# Patient Record
Sex: FEMALE | Race: WHITE | NOT HISPANIC OR LATINO | Employment: OTHER | URBAN - METROPOLITAN AREA
[De-identification: names, ages, dates, MRNs, and addresses within clinical notes are randomized per-mention and may not be internally consistent; named-entity substitution may affect disease eponyms.]

---

## 2017-02-24 ENCOUNTER — OFFICE VISIT (OUTPATIENT)
Dept: FAMILY MEDICINE | Facility: CLINIC | Age: 57
End: 2017-02-24
Payer: COMMERCIAL

## 2017-02-24 VITALS
WEIGHT: 155.6 LBS | TEMPERATURE: 98 F | HEART RATE: 83 BPM | HEIGHT: 62 IN | OXYGEN SATURATION: 97 % | BODY MASS INDEX: 28.63 KG/M2 | DIASTOLIC BLOOD PRESSURE: 80 MMHG | SYSTOLIC BLOOD PRESSURE: 122 MMHG

## 2017-02-24 DIAGNOSIS — M65.4 DE QUERVAIN'S DISEASE (TENOSYNOVITIS): Primary | ICD-10-CM

## 2017-02-24 PROCEDURE — 99213 OFFICE O/P EST LOW 20 MIN: CPT | Performed by: FAMILY MEDICINE

## 2017-02-24 NOTE — MR AVS SNAPSHOT
"              After Visit Summary   2/24/2017    Allie Thao    MRN: [id number]           Patient Information     Date Of Birth          1960        Visit Information        Provider Department      2/24/2017 2:20 PM Donna Cheatham MD [facility] [place]        Today's Diagnoses     De Quervain's disease (tenosynovitis)    -  1       Follow-ups after your visit        Who to contact     If you have questions or need follow up information about today's clinic visit or your schedule please contact [facility] [place] directly at [phone].  Normal or non-critical lab and imaging results will be communicated to you by Yelphart, letter or phone within 4 business days after the clinic has received the results. If you do not hear from us within 7 days, please contact the clinic through Yelphart or phone. If you have a critical or abnormal lab result, we will notify you by phone as soon as possible.  Submit refill requests through OptMed or call your pharmacy and they will forward the refill request to us. Please allow 3 business days for your refill to be completed.          Additional Information About Your Visit        MyChart Information     OptMed gives you secure access to your electronic health record. If you see a primary care provider, you can also send messages to your care team and make appointments. If you have questions, please call your primary care clinic.  If you do not have a primary care provider, please call [phone] and they will assist you.        Care EveryWhere ID     This is your Care EveryWhere ID. This could be used by other organizations to access your [facility] medical records  [id number]        Your Vitals Were     Pulse Temperature Height Last Period Pulse Oximetry BMI (Body Mass Index)    83 98  F (36.7  C) 5' 2\" (1.575 m) 08/16/2010 97% 28.46 kg/m2       Blood Pressure from Last 3 Encounters:   02/24/17 122/80   09/21/16 122/82   09/02/16 120/80    Weight " from Last 3 Encounters:   02/24/17 155 lb 9.6 oz (70.6 kg)   09/21/16 150 lb (68 kg)   09/02/16 149 lb (67.6 kg)              Today, you had the following     No orders found for display         Today's Medication Changes          These changes are accurate as of: 2/24/17  4:16 PM.  If you have any questions, ask your nurse or doctor.               Start taking these medicines.        Dose/Directions    order for DME   Used for:  De Quervain's disease (tenosynovitis)   Started by:  Donna Cheatham MD        Equipment being ordered: left thumb spica.   Quantity:  1 each   Refills:  0            Where to get your medicines      Some of these will need a paper prescription and others can be bought over the counter.  Ask your nurse if you have questions.     Bring a paper prescription for each of these medications     order for DME                Primary Care Provider Office Phone # Fax #    Donna Cheatham [phone]       [address]  [place] [address]        Thank you!     Thank you for choosing [facility]  for your care. Our goal is always to provide you with excellent care. Hearing back from our patients is one way we can continue to improve our services. Please take a few minutes to complete the written survey that you may receive in the mail after your visit with us. Thank you!             Your Updated Medication List - Protect others around you: Learn how to safely use, store and throw away your medicines at www.disposemymeds.org.          This list is accurate as of: 2/24/17  4:16 PM.  Always use your most recent med list.                   Brand Name Dispense Instructions for use    ALLEGRA 180 MG tablet   Generic drug:  fexofenadine     90 tablet    Take 1 tablet by mouth daily.       buPROPion 150 MG 24 hr tablet    WELLBUTRIN XL    90 tablet    Take 1 tablet (150 mg) by mouth every morning       Calcium-Vitamin D-Vitamin K  500-100-40 MG-UNT-MCG Chew     90 tablet        DAILY MULTIVITAMIN PO      Take 1 tablet by mouth daily       estradiol 1 MG tablet    ESTRACE    90 tablet    TAKE 1 TABLET DAILY       fluticasone 50 MCG/ACT spray    FLONASE    16 g    Spray 2 sprays into both nostrils daily       ketotifen 0.025 % Soln ophthalmic solution    ZADITOR    1 Bottle    Place 1 drop into both eyes every 12 hours       levothyroxine 100 MCG tablet    SYNTHROID/LEVOTHROID    90 tablet    Take 1 tablet (100 mcg) by mouth daily       order for DME     1 each    Equipment being ordered: left thumb spica.       progesterone 200 MG capsule    PROMETRIUM    90 capsule    TAKE 1 CAPSULE AT BEDTIME       simvastatin 10 MG tablet    ZOCOR    90 tablet    Take 1 tablet (10 mg) by mouth At Bedtime       venlafaxine 75 MG 24 hr capsule    EFFEXOR-XR    30 capsule    Take 1 capsule (75 mg) by mouth daily

## 2017-02-24 NOTE — PROGRESS NOTES
SUBJECTIVE:                                                    Allie Thao is a 57 year old female who presents to clinic today for the following health issues:      Concern - thumb pain      Onset: 3 days     Description:   Left thumb pain    Intensity: moderate 7/10    Progression of Symptoms:  worsening    Accompanying Signs & Symptoms:  None        Previous history of similar problem:    yes    Precipitating factors:   Worsened by: movement    Patient denies trauma.      Therapies Tried and outcome: brace, tylenol, ibuprofen, chiropractor     Denies any weakness, numbness or tingling. Denies any swelling.    Problem list and histories reviewed & adjusted, as indicated.  Additional history: as documented    Current Outpatient Prescriptions   Medication Sig Dispense Refill     order for DME Equipment being ordered: left thumb spica. 1 each 0     progesterone (PROMETRIUM) 200 MG capsule TAKE 1 CAPSULE AT BEDTIME 90 capsule 1     ketotifen (ZADITOR) 0.025 % SOLN Place 1 drop into both eyes every 12 hours 1 Bottle 3     venlafaxine (EFFEXOR-XR) 75 MG 24 hr capsule Take 1 capsule (75 mg) by mouth daily 30 capsule 3     simvastatin (ZOCOR) 10 MG tablet Take 1 tablet (10 mg) by mouth At Bedtime 90 tablet 3     buPROPion (WELLBUTRIN XL) 150 MG 24 hr tablet Take 1 tablet (150 mg) by mouth every morning 90 tablet 1     levothyroxine (SYNTHROID,LEVOTHROID) 100 MCG tablet Take 1 tablet (100 mcg) by mouth daily 90 tablet 3     fluticasone (FLONASE) 50 MCG/ACT nasal spray Spray 2 sprays into both nostrils daily 16 g 11     estradiol (ESTRACE) 1 MG tablet TAKE 1 TABLET DAILY 90 tablet 2     Calcium-Vitamin D-Vitamin K 500-100-40 MG-UNT-MCG CHEW  90 tablet      Multiple Vitamin (DAILY MULTIVITAMIN PO) Take 1 tablet by mouth daily       fexofenadine (ALLEGRA) 180 MG tablet Take 1 tablet by mouth daily. 90 tablet 3     Allergies   Allergen Reactions     Cats      Ragweeds      Recent Labs   Lab Test  09/21/16   1910   "09/24/15   0725   08/14/14   0755   LDL  69  61   --   52   HDL  59  56   --   58   TRIG  128  166*   --   148   ALT  21  20   --   17   CR  0.72  0.79   --   0.75   GFRESTIMATED  83  75   --   80   GFRESTBLACK  >90   GFR Calc    >90   GFR Calc     --   >90   GFR Calc     POTASSIUM  4.2  3.9   --   4.0   TSH  1.94  2.61   < >  0.22*    < > = values in this interval not displayed.        ROS:  C: NEGATIVE for fever, chills, change in weight  INTEGUMENTARY/SKIN: NEGATIVE for worrisome rashes, moles or lesions    OBJECTIVE:                                                    /80 (BP Location: Right arm, Patient Position: Chair, Cuff Size: Adult Regular)  Pulse 83  Temp 98  F (36.7  C)  Ht 5' 2\" (1.575 m)  Wt 155 lb 9.6 oz (70.6 kg)  LMP 08/16/2010  SpO2 97%  BMI 28.46 kg/m2  Body mass index is 28.46 kg/(m^2).   GENERAL: healthy, alert, well nourished, well hydrated, no distress  RESP: lungs clear to auscultation - no rales, no rhonchi, no wheezes  CV: regular rates and rhythm, normal S1 S2, no S3 or S4 and no murmur, no click or rub -  Left hand: No swelling or ecchymosis or erythema noted. Mild tenderness on palpation over the base of the thumb to the wrist. Range of motion of the thumb is normal. Pain felt with moving the thumb against resistance. Normal sensation.    No tenderness over the wrist. Range of motion of the wrist is normal. No tenderness in the forearm noted.       ASSESSMENT/PLAN:                                                        ICD-10-CM    1. De Quervain's disease (tenosynovitis) M65.4 order for DME     Patient is given a thumb spica to immobilize the affected side. Recommended to use ibuprofen at least twice a day for the next few days to improve the inflammation.  If in the next 2-3 weeks, and no improvement noted, she is asked to contact me back to get a referral to hand therapy.  No imaging studies indicated today.      Follow up with " Provider - as needed     Donna Cheatham MD  [place]

## 2017-02-24 NOTE — NURSING NOTE
".  Chief Complaint   Patient presents with     Finger       Initial /80 (BP Location: Right arm, Patient Position: Chair, Cuff Size: Adult Regular)  Pulse 83  Temp 98  F (36.7  C)  Ht 5' 2\" (1.575 m)  Wt 155 lb 9.6 oz (70.6 kg)  LMP 08/16/2010  SpO2 97%  BMI 28.46 kg/m2 Estimated body mass index is 28.46 kg/(m^2) as calculated from the following:    Height as of this encounter: 5' 2\" (1.575 m).    Weight as of this encounter: 155 lb 9.6 oz (70.6 kg).  Medication Reconciliation: complete Leigha NASH MA Student  "

## 2017-03-06 DIAGNOSIS — F32.1 MAJOR DEPRESSIVE DISORDER, SINGLE EPISODE, MODERATE (H): ICD-10-CM

## 2017-03-07 NOTE — TELEPHONE ENCOUNTER
effexor     Last Written Prescription Date: 9/21/16  Last Fill Quantity: 30, # refills: 3  Last Office Visit with G, P or [facility] prescribing provider: 2/24/17        BP Readings from Last 3 Encounters:   02/24/17 122/80   09/21/16 122/82   09/02/16 120/80     Pulse: (for Fetzima)  Creatinine   Date Value Ref Range Status   09/21/2016 0.72 0.52 - 1.04 mg/dL Final   ]    Last PHQ-9 score on record=   PHQ-9 SCORE 9/21/2016   Total Score -   Total Score 2

## 2017-03-08 RX ORDER — VENLAFAXINE HYDROCHLORIDE 75 MG/1
CAPSULE, EXTENDED RELEASE ORAL
Qty: 90 CAPSULE | Refills: 1 | Status: SHIPPED | OUTPATIENT
Start: 2017-03-08 | End: 2017-05-18

## 2017-03-08 NOTE — TELEPHONE ENCOUNTER
Completed PHQ-9 over the phone.  PHQ-9 SCORE 7/12/2016 9/21/2016 3/8/2017   Total Score - - -   Total Score 2 2 0     Prescription approved per [facility] Refill Protocol.  Soledad Mckenzie RN

## 2017-03-08 NOTE — TELEPHONE ENCOUNTER
Dr. Camp.  Please disregard.  Routing to wrong provider.      Needs update PHQ-9.    mychart message sent with questionnaire attached.  Awaiting response.    Sera Cardona RN

## 2017-03-09 ASSESSMENT — PATIENT HEALTH QUESTIONNAIRE - PHQ9: SUM OF ALL RESPONSES TO PHQ QUESTIONS 1-9: 0

## 2017-03-13 ASSESSMENT — ANXIETY QUESTIONNAIRES
1. FEELING NERVOUS, ANXIOUS, OR ON EDGE: NOT AT ALL
5. BEING SO RESTLESS THAT IT IS HARD TO SIT STILL: NOT AT ALL
6. BECOMING EASILY ANNOYED OR IRRITABLE: NOT AT ALL
IF YOU CHECKED OFF ANY PROBLEMS ON THIS QUESTIONNAIRE, HOW DIFFICULT HAVE THESE PROBLEMS MADE IT FOR YOU TO DO YOUR WORK, TAKE CARE OF THINGS AT HOME, OR GET ALONG WITH OTHER PEOPLE: NOT DIFFICULT AT ALL
3. WORRYING TOO MUCH ABOUT DIFFERENT THINGS: NOT AT ALL
7. FEELING AFRAID AS IF SOMETHING AWFUL MIGHT HAPPEN: NOT AT ALL
2. NOT BEING ABLE TO STOP OR CONTROL WORRYING: NOT AT ALL
GAD7 TOTAL SCORE: 0

## 2017-03-13 ASSESSMENT — PATIENT HEALTH QUESTIONNAIRE - PHQ9: 5. POOR APPETITE OR OVEREATING: NOT AT ALL

## 2017-03-14 ASSESSMENT — PATIENT HEALTH QUESTIONNAIRE - PHQ9: SUM OF ALL RESPONSES TO PHQ QUESTIONS 1-9: 0

## 2017-03-14 ASSESSMENT — ANXIETY QUESTIONNAIRES: GAD7 TOTAL SCORE: 0

## 2017-03-17 ENCOUNTER — OFFICE VISIT (OUTPATIENT)
Dept: FAMILY MEDICINE | Facility: CLINIC | Age: 57
End: 2017-03-17
Payer: COMMERCIAL

## 2017-03-17 VITALS
BODY MASS INDEX: 28.71 KG/M2 | TEMPERATURE: 98.1 F | HEART RATE: 87 BPM | WEIGHT: 156 LBS | SYSTOLIC BLOOD PRESSURE: 120 MMHG | DIASTOLIC BLOOD PRESSURE: 78 MMHG | OXYGEN SATURATION: 99 % | HEIGHT: 62 IN

## 2017-03-17 DIAGNOSIS — M65.4 DE QUERVAIN'S DISEASE (TENOSYNOVITIS): Primary | ICD-10-CM

## 2017-03-17 PROCEDURE — 99213 OFFICE O/P EST LOW 20 MIN: CPT | Performed by: FAMILY MEDICINE

## 2017-03-17 NOTE — MR AVS SNAPSHOT
"              After Visit Summary   3/17/2017    Allie Thao    MRN: [id number]           Patient Information     Date Of Birth          1960        Visit Information        Provider Department      3/17/2017 3:00 PM Donna Cheatham MD [facility] [place]        Today's Diagnoses     De Quervain's disease (tenosynovitis)    -  1       Follow-ups after your visit        Who to contact     If you have questions or need follow up information about today's clinic visit or your schedule please contact [facility] [place] directly at [phone].  Normal or non-critical lab and imaging results will be communicated to you by theAudiencehart, letter or phone within 4 business days after the clinic has received the results. If you do not hear from us within 7 days, please contact the clinic through theAudiencehart or phone. If you have a critical or abnormal lab result, we will notify you by phone as soon as possible.  Submit refill requests through Tessella or call your pharmacy and they will forward the refill request to us. Please allow 3 business days for your refill to be completed.          Additional Information About Your Visit        MyChart Information     Tessella gives you secure access to your electronic health record. If you see a primary care provider, you can also send messages to your care team and make appointments. If you have questions, please call your primary care clinic.  If you do not have a primary care provider, please call [phone] and they will assist you.        Care EveryWhere ID     This is your Care EveryWhere ID. This could be used by other organizations to access your [place] medical records  [id number]        Your Vitals Were     Pulse Temperature Height Last Period Pulse Oximetry BMI (Body Mass Index)    87 98.1  F (36.7  C) (Tympanic) 5' 2\" (1.575 m) 08/16/2010 99% 28.53 kg/m2       Blood Pressure from Last 3 Encounters:   03/17/17 120/78   02/24/17 122/80   09/21/16 " 122/82    Weight from Last 3 Encounters:   03/17/17 156 lb (70.8 kg)   02/24/17 155 lb 9.6 oz (70.6 kg)   09/21/16 150 lb (68 kg)              Today, you had the following     No orders found for display       Primary Care Provider Office Phone # Fax #    Donna Cheatham [phone]       [facility] [address] [place] [address] [address]  [place] [address]        Thank you!     Thank you for choosing [facility]  for your care. Our goal is always to provide you with excellent care. Hearing back from our patients is one way we can continue to improve our services. Please take a few minutes to complete the written survey that you may receive in the mail after your visit with us. Thank you!             Your Updated Medication List - Protect others around you: Learn how to safely use, store and throw away your medicines at www.disposemymeds.org.          This list is accurate as of: 3/17/17  3:48 PM.  Always use your most recent med list.                   Brand Name Dispense Instructions for use    ALLEGRA 180 MG tablet   Generic drug:  fexofenadine     90 tablet    Take 1 tablet by mouth daily.       buPROPion 150 MG 24 hr tablet    WELLBUTRIN XL    90 tablet    Take 1 tablet (150 mg) by mouth every morning       Calcium-Vitamin D-Vitamin K 500-100-40 MG-UNT-MCG Chew     90 tablet        DAILY MULTIVITAMIN PO      Take 1 tablet by mouth daily       estradiol 1 MG tablet    ESTRACE    90 tablet    TAKE 1 TABLET DAILY       fluticasone 50 MCG/ACT spray    FLONASE    16 g    Spray 2 sprays into both nostrils daily       ketotifen 0.025 % Soln ophthalmic solution    ZADITOR    1 Bottle    Place 1 drop into both eyes every 12 hours       levothyroxine 100 MCG tablet    SYNTHROID/LEVOTHROID    90 tablet    Take 1 tablet (100 mcg) by mouth daily       order for DME     1 each    Equipment being ordered: left thumb spica.       progesterone 200 MG capsule    PROMETRIUM    90 capsule     TAKE 1 CAPSULE AT BEDTIME       simvastatin 10 MG tablet    ZOCOR    90 tablet    Take 1 tablet (10 mg) by mouth At Bedtime       venlafaxine 75 MG 24 hr capsule    EFFEXOR-XR    90 capsule    TAKE ONE CAPSULE BY MOUTH EVERY DAY

## 2017-03-17 NOTE — PROGRESS NOTES
SUBJECTIVE:                                                    Allie Thao is a 57 year old female who presents to clinic today for the following health issues:      Concern - thumb tendonitis      Onset: follow-up    Description:   Improved alot but still having tingling with some numbness.    Intensity: mild, moderate    Progression of Symptoms:  improving    Accompanying Signs & Symptoms:      She was able to work without the brace today on her computer and tolerated it well. C/o mild pain with pushing over the affected area. No swelling.      Therapies Tried and outcome: thumb brace and advil         Problem list and histories reviewed & adjusted, as indicated.  Additional history: as documented    Current Outpatient Prescriptions   Medication Sig Dispense Refill     venlafaxine (EFFEXOR-XR) 75 MG 24 hr capsule TAKE ONE CAPSULE BY MOUTH EVERY DAY 90 capsule 1     order for DME Equipment being ordered: left thumb spica. 1 each 0     ketotifen (ZADITOR) 0.025 % SOLN Place 1 drop into both eyes every 12 hours 1 Bottle 3     simvastatin (ZOCOR) 10 MG tablet Take 1 tablet (10 mg) by mouth At Bedtime 90 tablet 3     buPROPion (WELLBUTRIN XL) 150 MG 24 hr tablet Take 1 tablet (150 mg) by mouth every morning 90 tablet 1     levothyroxine (SYNTHROID,LEVOTHROID) 100 MCG tablet Take 1 tablet (100 mcg) by mouth daily 90 tablet 3     fluticasone (FLONASE) 50 MCG/ACT nasal spray Spray 2 sprays into both nostrils daily 16 g 11     Calcium-Vitamin D-Vitamin K 500-100-40 MG-UNT-MCG CHEW  90 tablet      Multiple Vitamin (DAILY MULTIVITAMIN PO) Take 1 tablet by mouth daily       fexofenadine (ALLEGRA) 180 MG tablet Take 1 tablet by mouth daily. 90 tablet 3     progesterone (PROMETRIUM) 200 MG capsule TAKE 1 CAPSULE AT BEDTIME (Patient not taking: Reported on 3/17/2017) 90 capsule 1     estradiol (ESTRACE) 1 MG tablet TAKE 1 TABLET DAILY (Patient not taking: Reported on 3/17/2017) 90 tablet 2     Allergies   Allergen  "Reactions     Cats      Ragweeds      Recent Labs   Lab Test  09/21/16   1045  09/24/15   0725   08/14/14   0755   LDL  69  61   --   52   HDL  59  56   --   58   TRIG  128  166*   --   148   ALT  21  20   --   17   CR  0.72  0.79   --   0.75   GFRESTIMATED  83  75   --   80   GFRESTBLACK  >90   GFR Calc    >90   GFR Calc     --   >90   GFR Calc     POTASSIUM  4.2  3.9   --   4.0   TSH  1.94  2.61   < >  0.22*    < > = values in this interval not displayed.        ROS:  C: NEGATIVE for fever, chills, change in weight  INTEGUMENTARY/SKIN: NEGATIVE for worrisome rashes, moles or lesions    OBJECTIVE:                                                    /78 (BP Location: Left arm, Patient Position: Chair, Cuff Size: Adult Regular)  Pulse 87  Temp 98.1  F (36.7  C) (Tympanic)  Ht 5' 2\" (1.575 m)  Wt 156 lb (70.8 kg)  LMP 08/16/2010  SpO2 99%  BMI 28.53 kg/m2  Body mass index is 28.53 kg/(m^2).   GENERAL: healthy, alert, well nourished, well hydrated, no distress  RESP: lungs clear to auscultation - no rales, no rhonchi, no wheezes  CV: regular rates and rhythm, normal S1 S2, no S3 or S4 and no murmur, no click or rub -  Left hand: No swelling or ecchymosis or erythema noted. Mild tenderness on palpation over the base of the thumb to the wrist. Range of motion of the thumb is normal. No pain felt with moving the thumb against resistance. Normal sensation.    No tenderness over the wrist. Range of motion of the wrist is normal. No tenderness in the forearm noted.       ASSESSMENT/PLAN:                                                        ICD-10-CM    1. De Quervain's disease (tenosynovitis) M65.4    Symptoms have much improved.   Patient is recommended to continue the use of thumb spica to immobilize the affected side. Recommended to use ibuprofen Prn now     Follow up with Provider - as needed , if any worsening noted in 3-4 weeks.     Donna Cheatham MD  [facility] " [facility]

## 2017-03-17 NOTE — NURSING NOTE
"Chief Complaint   Patient presents with     Thumb Discomfort       Initial /78 (BP Location: Left arm, Patient Position: Chair, Cuff Size: Adult Regular)  Pulse 87  Temp 98.1  F (36.7  C) (Tympanic)  Ht 5' 2\" (1.575 m)  Wt 156 lb (70.8 kg)  LMP 08/16/2010  SpO2 99%  BMI 28.53 kg/m2 Estimated body mass index is 28.53 kg/(m^2) as calculated from the following:    Height as of this encounter: 5' 2\" (1.575 m).    Weight as of this encounter: 156 lb (70.8 kg).  Medication Reconciliation: complete  "

## 2017-07-09 DIAGNOSIS — F32.1 MAJOR DEPRESSIVE DISORDER, SINGLE EPISODE, MODERATE (H): ICD-10-CM

## 2017-07-11 RX ORDER — BUPROPION HYDROCHLORIDE 150 MG/1
TABLET ORAL
Qty: 90 TABLET | Refills: 0 | Status: SHIPPED | OUTPATIENT
Start: 2017-07-11 | End: 2017-10-09

## 2017-07-11 NOTE — TELEPHONE ENCOUNTER
Wellbutrin        Last Written Prescription Date:5/18/2017  Last Fill Quantity: 90# refills: 1  Last Office Visit with G, P or [facility] prescribing provider:  2/24/2017       Last PHQ-9 score on record=   PHQ-9 SCORE 5/18/2017   Total Score MyChart 7 (Mild depression)   Total Score -       Lab Results   Component Value Date    AST 15 09/21/2016     Lab Results   Component Value Date    ALT 21 09/21/2016

## 2017-10-09 ENCOUNTER — OFFICE VISIT (OUTPATIENT)
Dept: FAMILY MEDICINE | Facility: CLINIC | Age: 57
End: 2017-10-09
Payer: COMMERCIAL

## 2017-10-09 VITALS
DIASTOLIC BLOOD PRESSURE: 78 MMHG | WEIGHT: 148 LBS | SYSTOLIC BLOOD PRESSURE: 130 MMHG | TEMPERATURE: 97.8 F | HEART RATE: 87 BPM | HEIGHT: 62 IN | BODY MASS INDEX: 27.23 KG/M2 | OXYGEN SATURATION: 96 %

## 2017-10-09 DIAGNOSIS — F32.1 MAJOR DEPRESSIVE DISORDER, SINGLE EPISODE, MODERATE (H): ICD-10-CM

## 2017-10-09 DIAGNOSIS — J31.0 CHRONIC RHINITIS, UNSPECIFIED TYPE: ICD-10-CM

## 2017-10-09 DIAGNOSIS — E78.5 HYPERLIPIDEMIA LDL GOAL <160: ICD-10-CM

## 2017-10-09 DIAGNOSIS — E03.9 ACQUIRED HYPOTHYROIDISM: ICD-10-CM

## 2017-10-09 DIAGNOSIS — Z12.31 ENCOUNTER FOR SCREENING MAMMOGRAM FOR BREAST CANCER: ICD-10-CM

## 2017-10-09 DIAGNOSIS — Z00.00 ROUTINE GENERAL MEDICAL EXAMINATION AT A HEALTH CARE FACILITY: Primary | ICD-10-CM

## 2017-10-09 LAB
ALBUMIN SERPL-MCNC: 3.9 G/DL (ref 3.4–5)
ALP SERPL-CCNC: 110 U/L (ref 40–150)
ALT SERPL W P-5'-P-CCNC: 23 U/L (ref 0–50)
ANION GAP SERPL CALCULATED.3IONS-SCNC: 8 MMOL/L (ref 3–14)
AST SERPL W P-5'-P-CCNC: 17 U/L (ref 0–45)
BILIRUB SERPL-MCNC: 0.6 MG/DL (ref 0.2–1.3)
BUN SERPL-MCNC: 16 MG/DL (ref 7–30)
CALCIUM SERPL-MCNC: 9.3 MG/DL (ref 8.5–10.1)
CHLORIDE SERPL-SCNC: 104 MMOL/L (ref 94–109)
CHOLEST SERPL-MCNC: 170 MG/DL
CO2 SERPL-SCNC: 28 MMOL/L (ref 20–32)
CREAT SERPL-MCNC: 0.83 MG/DL (ref 0.52–1.04)
GFR SERPL CREATININE-BSD FRML MDRD: 70 ML/MIN/1.7M2
GLUCOSE SERPL-MCNC: 79 MG/DL (ref 70–99)
HDLC SERPL-MCNC: 64 MG/DL
HGB BLD-MCNC: 13.3 G/DL (ref 11.7–15.7)
LDLC SERPL CALC-MCNC: 79 MG/DL
NONHDLC SERPL-MCNC: 106 MG/DL
POTASSIUM SERPL-SCNC: 3.8 MMOL/L (ref 3.4–5.3)
PROT SERPL-MCNC: 7.7 G/DL (ref 6.8–8.8)
SODIUM SERPL-SCNC: 140 MMOL/L (ref 133–144)
TRIGL SERPL-MCNC: 134 MG/DL
TSH SERPL DL<=0.005 MIU/L-ACNC: 0.85 MU/L (ref 0.4–4)

## 2017-10-09 PROCEDURE — 80053 COMPREHEN METABOLIC PANEL: CPT | Performed by: FAMILY MEDICINE

## 2017-10-09 PROCEDURE — 80061 LIPID PANEL: CPT | Performed by: FAMILY MEDICINE

## 2017-10-09 PROCEDURE — 99396 PREV VISIT EST AGE 40-64: CPT | Performed by: FAMILY MEDICINE

## 2017-10-09 PROCEDURE — 85018 HEMOGLOBIN: CPT | Performed by: FAMILY MEDICINE

## 2017-10-09 PROCEDURE — 84443 ASSAY THYROID STIM HORMONE: CPT | Performed by: FAMILY MEDICINE

## 2017-10-09 PROCEDURE — 87624 HPV HI-RISK TYP POOLED RSLT: CPT | Performed by: FAMILY MEDICINE

## 2017-10-09 PROCEDURE — G0145 SCR C/V CYTO,THINLAYER,RESCR: HCPCS | Performed by: FAMILY MEDICINE

## 2017-10-09 PROCEDURE — 36415 COLL VENOUS BLD VENIPUNCTURE: CPT | Performed by: FAMILY MEDICINE

## 2017-10-09 RX ORDER — BUPROPION HYDROCHLORIDE 150 MG/1
TABLET ORAL
Qty: 180 TABLET | Refills: 1 | Status: SHIPPED | OUTPATIENT
Start: 2017-10-09 | End: 2018-01-09

## 2017-10-09 RX ORDER — VENLAFAXINE HYDROCHLORIDE 37.5 MG/1
CAPSULE, EXTENDED RELEASE ORAL
Qty: 14 CAPSULE | Refills: 0 | Status: SHIPPED | OUTPATIENT
Start: 2017-10-09 | End: 2017-11-13

## 2017-10-09 RX ORDER — SIMVASTATIN 10 MG
10 TABLET ORAL AT BEDTIME
Qty: 90 TABLET | Refills: 3 | Status: SHIPPED | OUTPATIENT
Start: 2017-10-09 | End: 2018-10-17

## 2017-10-09 RX ORDER — FLUTICASONE PROPIONATE 50 MCG
2 SPRAY, SUSPENSION (ML) NASAL DAILY
Qty: 16 G | Refills: 11 | Status: SHIPPED | OUTPATIENT
Start: 2017-10-09 | End: 2019-07-30

## 2017-10-09 RX ORDER — LEVOTHYROXINE SODIUM 100 UG/1
100 TABLET ORAL DAILY
Qty: 90 TABLET | Refills: 3 | Status: SHIPPED | OUTPATIENT
Start: 2017-10-09 | End: 2018-10-17

## 2017-10-09 ASSESSMENT — ANXIETY QUESTIONNAIRES
3. WORRYING TOO MUCH ABOUT DIFFERENT THINGS: SEVERAL DAYS
6. BECOMING EASILY ANNOYED OR IRRITABLE: SEVERAL DAYS
5. BEING SO RESTLESS THAT IT IS HARD TO SIT STILL: NOT AT ALL
1. FEELING NERVOUS, ANXIOUS, OR ON EDGE: SEVERAL DAYS
2. NOT BEING ABLE TO STOP OR CONTROL WORRYING: SEVERAL DAYS
GAD7 TOTAL SCORE: 6
7. FEELING AFRAID AS IF SOMETHING AWFUL MIGHT HAPPEN: SEVERAL DAYS
IF YOU CHECKED OFF ANY PROBLEMS ON THIS QUESTIONNAIRE, HOW DIFFICULT HAVE THESE PROBLEMS MADE IT FOR YOU TO DO YOUR WORK, TAKE CARE OF THINGS AT HOME, OR GET ALONG WITH OTHER PEOPLE: SOMEWHAT DIFFICULT

## 2017-10-09 ASSESSMENT — PATIENT HEALTH QUESTIONNAIRE - PHQ9
SUM OF ALL RESPONSES TO PHQ QUESTIONS 1-9: 3
5. POOR APPETITE OR OVEREATING: SEVERAL DAYS

## 2017-10-09 NOTE — PROGRESS NOTES
SUBJECTIVE:   CC: Allie Thao is an 57 year old woman who presents for preventive health visit.     Healthy Habits:    Do you get at least three servings of calcium containing foods daily (dairy, green leafy vegetables, etc.)? yes    Amount of exercise or daily activities, outside of work: none    Problems taking medications regularly No    Medication side effects: Yes no sex drive    Have you had an eye exam in the past two years? yes    Do you see a dentist twice per year? yes    Do you have sleep apnea, excessive snoring or daytime drowsiness?no          Hyperlipidemia Follow-Up      Rate your low fat/cholesterol diet?: good    Taking statin?  Yes, no muscle aches from statin    Other lipid medications/supplements?:  none    Depression Followup    Status since last visit: Stable     See PHQ-9 for current symptoms.  Other associated symptoms: None    Complicating factors:   Significant life event:  No   Current substance abuse:  None  Anxiety or Panic symptoms:  No  Noticing side effects from venlafaxine. Would like to stop that. She is also on Wellbutrin.    PHQ-9 Score and MyChart F/U Questions 3/8/2017 3/13/2017 10/9/2017   Total Score 0 0 3   Q9: Suicide Ideation Not at all Not at all Not at all       PHQ-9  English  PHQ-9   Any Language  Suicide Assessment Five-step Evaluation and Treatment (SAFE-T)  Hypothyroidism Follow-up      Since last visit, patient describes the following symptoms: Weight stable, no hair loss, no skin changes, no constipation, no loose stools          Today's PHQ-2 Score:   PHQ-2 ( 1999 Pf[employer]) 10/9/2017 2/24/2017   Q1: Little interest or pleasure in doing things 0 0   Q2: Feeling down, depressed or hopeless 0 0   PHQ-2 Score 0 0   Q1: Little interest or pleasure in doing things - -   Q2: Feeling down, depressed or hopeless - -   PHQ-2 Score - -         Abuse: Current or Past(Physical, Sexual or Emotional)- No  Do you feel safe in your environment - Yes  Social History    Substance Use Topics     Smoking status: Former Smoker     Packs/day: 1.50     Years: 11.00     Types: Cigarettes     Quit date: 1/1/1988     Smokeless tobacco: Never Used     Alcohol use 0.0 oz/week     0 Standard drinks or equivalent per week      Comment: 0-3 beers a week     The patient does not drink >3 drinks per day nor >7 drinks per week.    Reviewed orders with patient.  Reviewed health maintenance and updated orders accordingly - Yes  Labs reviewed in EPIC  BP Readings from Last 3 Encounters:   10/09/17 130/78   03/17/17 120/78   02/24/17 122/80    Wt Readings from Last 3 Encounters:   10/09/17 148 lb (67.1 kg)   03/17/17 156 lb (70.8 kg)   02/24/17 155 lb 9.6 oz (70.6 kg)                  Patient Active Problem List   Diagnosis     Osteoarthritis cervical spine     Allergic rhinitis     History of basal cell carcinoma     Idiopathic urticaria     Hashimoto's thyroiditis     HYPERLIPIDEMIA LDL GOAL <160     Moderate Depression [296.22]     Flat epithelial atypia of breast     Advanced directives, counseling/discussion     Acquired hypothyroidism     Chronic rhinitis, unspecified type     Past Surgical History:   Procedure Laterality Date     COLONOSCOPY  8/2010    4 mm polyps mid sigmoid and mid descending colon, repeat 5 years     DEXA  10/2010    T score lumbar 0.8, femoral neck -0.8/-0.2 with BMD 0.975     HC EXC MALIG SKIN LESION TRUNK/ARM/LEG <=0.5 CM  4/2005    excision basal cell right forearm     HC EXCISION BREAST LESION W XRAY MARKER, OPEN SINGLE  2/2010    Right breast excisional biopsy: Focal complex sclerosing lesion, focal columnar cell hyperplasia, adenosis with microcalcifications, no atypia     HC MRI CERVICAL SPINE W/O CONTRAST  10/2009    mulitilevel disc and facet joint denerative changes, mild spinal stenosis lower cervical levels, varying degrees of foraminal stenosis rt>lt (especially C4-5)     HC NCS MOTOR W/O F-WAVE, EACH NERVE  12/2009    mild left and very mild right median  nerve neuropathy     HC REPAIR INTERMED, WOUND TRUNK/ARM/LEG 7.6-12 CM  1970s    complex closure right knee wound     MRA ANGIOGRAM BRAIN & MRI BRAIN W/O CONTRAST  2013    normal     SONO PELVIS COMPLETE  2013    2 small unterine myoma (12 and 14 mm) NOT approximating endometrium, 4  mm endoemtrial thickness       Social History   Substance Use Topics     Smoking status: Former Smoker     Packs/day: 1.50     Years: 11.00     Types: Cigarettes     Quit date: 1988     Smokeless tobacco: Never Used     Alcohol use 0.0 oz/week     0 Standard drinks or equivalent per week      Comment: 0-3 beers a week     Family History   Problem Relation Age of Onset     Lipids Mother      CANCER Mother      several basal cell skin cancers     HEART DISEASE Mother      atrial fibrillation/pacemaker, rheumatic heart disease - valve replacement     GASTROINTESTINAL DISEASE Mother      GI bleeding from gastritis/diverticulitis     Alcohol/Drug Father      Respiratory Father      COPD,  66, smoker     Alzheimer Disease Maternal Grandmother      onset early 80s     Cancer - colorectal Maternal Grandfather       age 80     Breast Cancer Maternal Aunt      onset early 60s     Blood Disease Sister      thrombophlebitis, negative factor testing     Connective Tissue Disorder Brother      bilateral hip replacement 40s for slipped capital epiphyses     Neurologic Disorder Other      nieces/nephews with Tourette's     Osteoarthritis Sister      hands         Current Outpatient Prescriptions   Medication Sig Dispense Refill     Nutritional Supplements (ESTROVEN PO)        simvastatin (ZOCOR) 10 MG tablet Take 1 tablet (10 mg) by mouth At Bedtime 90 tablet 3     levothyroxine (SYNTHROID/LEVOTHROID) 100 MCG tablet Take 1 tablet (100 mcg) by mouth daily 90 tablet 3     fluticasone (FLONASE) 50 MCG/ACT spray Spray 2 sprays into both nostrils daily 16 g 11     buPROPion (WELLBUTRIN XL) 150 MG 24 hr tablet Take 1 tab daily for 2 weeks and  "then increase it to 2 tabs daily. 180 tablet 1     venlafaxine (EFFEXOR-XR) 37.5 MG 24 hr capsule Use 75 mg cap daily for 2 weeks and then use 37.5 mg cap daily for 2 weeks and then stop. 14 capsule 0     order for DME Equipment being ordered: left thumb spica. 1 each 0     ketotifen (ZADITOR) 0.025 % SOLN Place 1 drop into both eyes every 12 hours 1 Bottle 3     Calcium-Vitamin D-Vitamin K 500-100-40 MG-UNT-MCG CHEW  90 tablet      Multiple Vitamin (DAILY MULTIVITAMIN PO) Take 1 tablet by mouth daily       fexofenadine (ALLEGRA) 180 MG tablet Take 1 tablet by mouth daily. 90 tablet 3     Allergies   Allergen Reactions     Cats      Ragweeds              Pertinent mammograms are reviewed under the imaging tab.  History of abnormal Pap smear: NO - age 30- 65 PAP every 3 years recommended    Reviewed and updated as needed this visit by clinical staffTobacco  Allergies  Meds  Med Hx  Surg Hx  Fam Hx  Soc Hx        Reviewed and updated as needed this visit by Provider              ROS:  C: NEGATIVE for fever, chills, change in weight  I: NEGATIVE for worrisome rashes, moles or lesions  E: NEGATIVE for vision changes or irritation  ENT: NEGATIVE for ear, mouth and throat problems  R: NEGATIVE for significant cough or SOB  B: NEGATIVE for masses, tenderness or discharge  CV: NEGATIVE for chest pain, palpitations or peripheral edema  GI: NEGATIVE for nausea, abdominal pain, heartburn, or change in bowel habits  : NEGATIVE for unusual urinary or vaginal symptoms. No vaginal bleeding.  M: NEGATIVE for significant arthralgias or myalgia  N: NEGATIVE for weakness, dizziness or paresthesias  P: NEGATIVE for changes in mood or affect     OBJECTIVE:   /78  Pulse 87  Temp 97.8  F (36.6  C) (Tympanic)  Ht 5' 2\" (1.575 m)  Wt 148 lb (67.1 kg)  LMP 08/16/2010  SpO2 96%  BMI 27.07 kg/m2  EXAM:  GENERAL APPEARANCE: healthy, alert and no distress  EYES: Eyes grossly normal to inspection, PERRL and conjunctivae and " sclerae normal  HENT: ear canals and TM's normal, nose and mouth without ulcers or lesions, oropharynx clear and oral mucous membranes moist  NECK: no adenopathy, no asymmetry, masses, or scars and thyroid normal to palpation  RESP: lungs clear to auscultation - no rales, rhonchi or wheezes  BREAST: normal without masses, tenderness or nipple discharge and no palpable axillary masses or adenopathy  CV: regular rate and rhythm, normal S1 S2, no S3 or S4, no murmur, click or rub, no peripheral edema and peripheral pulses strong  ABDOMEN: soft, nontender, no hepatosplenomegaly, no masses and bowel sounds normal  MS: no musculoskeletal defects are noted and gait is age appropriate without ataxia  SKIN: no suspicious lesions or rashes  NEURO: Normal strength and tone, sensory exam grossly normal, mentation intact and speech normal  PSYCH: mentation appears normal and affect normal/bright  Results for orders placed or performed in visit on 10/09/17   TSH   Result Value Ref Range    TSH 0.85 0.40 - 4.00 mU/L   Lipid Profile   Result Value Ref Range    Cholesterol 170 <200 mg/dL    Triglycerides 134 <150 mg/dL    HDL Cholesterol 64 >49 mg/dL    LDL Cholesterol Calculated 79 <100 mg/dL    Non HDL Cholesterol 106 <130 mg/dL   Comprehensive metabolic panel   Result Value Ref Range    Sodium 140 133 - 144 mmol/L    Potassium 3.8 3.4 - 5.3 mmol/L    Chloride 104 94 - 109 mmol/L    Carbon Dioxide 28 20 - 32 mmol/L    Anion Gap 8 3 - 14 mmol/L    Glucose 79 70 - 99 mg/dL    Urea Nitrogen 16 7 - 30 mg/dL    Creatinine 0.83 0.52 - 1.04 mg/dL    GFR Estimate 70 >60 mL/min/1.7m2    GFR Estimate If Black 85 >60 mL/min/1.7m2    Calcium 9.3 8.5 - 10.1 mg/dL    Bilirubin Total 0.6 0.2 - 1.3 mg/dL    Albumin 3.9 3.4 - 5.0 g/dL    Protein Total 7.7 6.8 - 8.8 g/dL    Alkaline Phosphatase 110 40 - 150 U/L    ALT 23 0 - 50 U/L    AST 17 0 - 45 U/L   Hemoglobin   Result Value Ref Range    Hemoglobin 13.3 11.7 - 15.7 g/dL   Pap imaged thin layer  screen with HPV - recommended age 30 - 65 years (select HPV order below)   Result Value Ref Range    PAP NIL     Copath Report         Patient Name: CHARITO MARIN  MR#: [id number]  Specimen #: [id number]  Collected: 10/9/2017  Received: 10/10/2017  Reported: 10/11/2017 15:52  Ordering Phy(s): FRACISCO VALDEZ    For improved result formatting, select 'View Enhanced Report Format'  under Linked Documents section.    SPECIMEN/STAIN PROCESS:  Pap imaged thin layer prep screening (Surepath, FocalPoint with guided  screening)       Pap-Cyto x 1, HPV ordered x 1    SOURCE: Cervical, endocervical  ----------------------------------------------------------------   Pap imaged thin layer prep screening (Surepath, FocalPoint with guided  screening)  SPECIMEN ADEQUACY:  Satisfactory for evaluation.  -Transitional zone component could not be determined due to atrophy.    CYTOLOGIC INTERPRETATION:    Negative for intraepithelial lesion or malignancy    Electronically signed out by:  ABBY Atkins (ASCP)    Process[facility] and screened at [facility]    CLINICAL HISTORY:  LMP: 8/ 16/10  Previous normal pap  Date of Last Pap: 9/17/15,    Papanicolaou Test Limitations:  Cervical cytology is a screening test  with limited sensitivity; regular screening is critical for cancer  prevention; Pap tests are primarily effective for the  diagnosis/prevention of squamous cell carcinoma, not adenocarcinomas or  other cancers.    TESTING LAB LOCATION:  [address]  [address] [phone]    COLLECTION SITE:  Client:  [facility]  Location: [facility] (S)     HPV High Risk Types DNA Cervical   Result Value Ref Range    HPV 16 DNA Negative NEG^Negative    HPV 18 DNA Negative NEG^Negative    Other HR HPV Negative NEG^Negative    Final Diagnosis This patient's sample is negative for HPV DNA.     Specimen Description Cervical Cells   "      ASSESSMENT/PLAN:   1. Routine general medical examination at a health care facility  Labs are all normal. Pap is negative.   - Hemoglobin  - Pap imaged thin layer screen with HPV - recommended age 30 - 65 years (select HPV order below)  - HPV High Risk Types DNA Cervical    2. Hyperlipidemia LDL goal <160  Well controlled with the medication.   - simvastatin (ZOCOR) 10 MG tablet; Take 1 tablet (10 mg) by mouth At Bedtime  Dispense: 90 tablet; Refill: 3  - Lipid Profile  - Comprehensive metabolic panel    3. Acquired hypothyroidism  Stable. Resume current medication.  - levothyroxine (SYNTHROID/LEVOTHROID) 100 MCG tablet; Take 1 tablet (100 mcg) by mouth daily  Dispense: 90 tablet; Refill: 3  - TSH    4. Moderate Depression [296.22]  Recommended to wean off the venlafaxine , due to side effects and increase the dose of Wellbutrin. F/u in 1 month.   - buPROPion (WELLBUTRIN XL) 150 MG 24 hr tablet; Take 1 tab daily for 2 weeks and then increase it to 2 tabs daily.  Dispense: 180 tablet; Refill: 1  - venlafaxine (EFFEXOR-XR) 37.5 MG 24 hr capsule; Use 75 mg cap daily for 2 weeks and then use 37.5 mg cap daily for 2 weeks and then stop.  Dispense: 14 capsule; Refill: 0    5. Chronic rhinitis, unspecified type    - fluticasone (FLONASE) 50 MCG/ACT spray; Spray 2 sprays into both nostrils daily  Dispense: 16 g; Refill: 11    6. Encounter for screening mammogram for breast cancer    - *MA Screening Digital Bilateral; Future    COUNSELING:   Reviewed preventive health counseling, as reflected in patient instructions       Regular exercise       Healthy diet/nutrition         reports that she quit smoking about 29 years ago. Her smoking use included Cigarettes. She has a 16.50 pack-year smoking history. She has never used smokeless tobacco.    Estimated body mass index is 27.07 kg/(m^2) as calculated from the following:    Height as of this encounter: 5' 2\" (1.575 m).    Weight as of this encounter: 148 lb (67.1 kg). "   Weight management plan: Discussed healthy diet and exercise guidelines and patient will follow up in 12 months in clinic to re-evaluate.    Counseling Resources:  ATP IV Guidelines  Pooled Cohorts Equation Calculator  Breast Cancer Risk Calculator  FRAX Risk Assessment  ICSI Preventive Guidelines  Dietary Guidelines for Americans, 2010  USDA's MyPlate  ASA Prophylaxis  Lung CA Screening    Donna Cheatham MD  [facility] [place]

## 2017-10-09 NOTE — NURSING NOTE
"Chief Complaint   Patient presents with     Physical     is fasting       Initial /78  Pulse 87  Temp 97.8  F (36.6  C) (Tympanic)  Ht 5' 2\" (1.575 m)  Wt 148 lb (67.1 kg)  LMP 08/16/2010  SpO2 96%  BMI 27.07 kg/m2 Estimated body mass index is 27.07 kg/(m^2) as calculated from the following:    Height as of this encounter: 5' 2\" (1.575 m).    Weight as of this encounter: 148 lb (67.1 kg).  Medication Reconciliation: complete   Philly Boyd CMA      "

## 2017-10-09 NOTE — MR AVS SNAPSHOT
After Visit Summary   10/9/2017    Allie Thao    MRN: [id number]           Patient Information     Date Of Birth          1960        Visit Information        Provider Department      10/9/2017 8:20 AM Donna Cheatham MD [facility] [place]        Today's Diagnoses     Routine general medical examination at a health care facility    -  1    Hyperlipidemia LDL goal <160        Acquired hypothyroidism        Moderate Depression [296.22]        Chronic rhinitis, unspecified type        Encounter for screening mammogram for breast cancer          Care Instructions      Preventive Health Recommendations  Female Ages 50 - 64    Yearly exam: See your health care provider every year in order to  o Review health changes.   o Discuss preventive care.    o Review your medicines if your doctor has prescribed any.      Get a Pap test every three years (unless you have an abnormal result and your provider advises testing more often).    If you get Pap tests with HPV test, you only need to test every 5 years, unless you have an abnormal result.     You do not need a Pap test if your uterus was removed (hysterectomy) and you have not had cancer.    You should be tested each year for STDs (sexually transmitted diseases) if you're at risk.     Have a mammogram every 1 to 2 years.    Have a colonoscopy at age 50, or have a yearly FIT test (stool test). These exams screen for colon cancer.      Have a cholesterol test every 5 years, or more often if advised.    Have a diabetes test (fasting glucose) every three years. If you are at risk for diabetes, you should have this test more often.     If you are at risk for osteoporosis (brittle bone disease), think about having a bone density scan (DEXA).    Shots: Get a flu shot each year. Get a tetanus shot every 10 years.    Nutrition:     Eat at least 5 servings of fruits and vegetables each day.    Eat whole-grain bread, whole-wheat pasta and brown rice  instead of white grains and rice.    Talk to your provider about Calcium and Vitamin D.     Lifestyle    Exercise at least 150 minutes a week (30 minutes a day, 5 days a week). This will help you control your weight and prevent disease.    Limit alcohol to one drink per day.    No smoking.     Wear sunscreen to prevent skin cancer.     See your dentist every six months for an exam and cleaning.    See your eye doctor every 1 to 2 years.            Follow-ups after your visit        Follow-up notes from your care team     Return in about 1 month (around 11/9/2017) for Mood Recheck.      Future tests that were ordered for you today     Open Future Orders        Priority Expected Expires Ordered    *MA Screening Digital Bilateral Routine  10/9/2018 10/9/2017            Who to contact     If you have questions or need follow up information about today's clinic visit or your schedule please contact [facility] [place] directly at [phone].  Normal or non-critical lab and imaging results will be communicated to you by MyChart, letter or phone within 4 business days after the clinic has received the results. If you do not hear from us within 7 days, please contact the clinic through NATIONSPLAYhart or phone. If you have a critical or abnormal lab result, we will notify you by phone as soon as possible.  Submit refill requests through [email or web address] or call your pharmacy and they will forward the refill request to us. Please allow 3 business days for your refill to be completed.          Additional Information About Your Visit        [email or web address] Information     [email or web address] gives you secure access to your electronic health record. If you see a primary care provider, you can also send messages to your care team and make appointments. If you have questions, please call your primary care clinic.  If you do not have a primary care provider, please call [phone] and they will assist you.        Care EveryWhere ID     This is your Care  "EveryWhere ID. This could be used by other organizations to access your [facility] medical records  [id number]        Your Vitals Were     Pulse Temperature Height Last Period Pulse Oximetry BMI (Body Mass Index)    87 97.8  F (36.6  C) (Tympanic) 5' 2\" (1.575 m) 08/16/2010 96% 27.07 kg/m2       Blood Pressure from Last 3 Encounters:   10/09/17 130/78   03/17/17 120/78   02/24/17 122/80    Weight from Last 3 Encounters:   10/09/17 148 lb (67.1 kg)   03/17/17 156 lb (70.8 kg)   02/24/17 155 lb 9.6 oz (70.6 kg)              We Performed the Following     Comprehensive metabolic panel     Hemoglobin     HPV High Risk Types DNA Cervical     Lipid Profile     Pap imaged thin layer screen with HPV - recommended age 30 - 65 years (select HPV order below)     TSH          Today's Medication Changes          These changes are accurate as of: 10/9/17  8:53 AM.  If you have any questions, ask your nurse or doctor.               These medicines have changed or have updated prescriptions.        Dose/Directions    buPROPion 150 MG 24 hr tablet   Commonly known as:  WELLBUTRIN XL   This may have changed:  See the new instructions.   Used for:  Major depressive disorder, single episode, moderate (H)   Changed by:  Donna Cheatham MD        Take 1 tab daily for 2 weeks and then increase it to 2 tabs daily.   Quantity:  180 tablet   Refills:  1       venlafaxine 37.5 MG 24 hr capsule   Commonly known as:  EFFEXOR-XR   This may have changed:    - medication strength  - how much to take  - how to take this  - when to take this  - additional instructions   Used for:  Major depressive disorder, single episode, moderate (H)   Changed by:  Donna Cheatham MD        Use 75 mg cap daily for 2 weeks and then use 37.5 mg cap daily for 2 weeks and then stop.   Quantity:  14 capsule   Refills:  0            Where to get your medicines      These medications were sent to [facility] - [place], [place] - [address] [place], [place] [address] " [facility] [address]     Phone:  [phone]     buPROPion 150 MG 24 hr tablet    fluticasone 50 MCG/ACT spray    levothyroxine 100 MCG tablet    simvastatin 10 MG tablet         These medications were sent to [facility] [place] - [address]  [address], [place] [address]     Phone:  [phone]     venlafaxine 37.5 MG 24 hr capsule                Primary Care Provider Office Phone # Fax #    Donna Cheatham [phone]       [address] [place] [address] [address]  [place] [address]        Equal Access to Services     GEORGIANA AVENDANO : Hadii brent rodriguez hadasho Solesly, waaxda luqadaha, qaybta kaalmada adeegyada, renetta lozoya. So [facility] [phone].    ATENCIÓN: Si habla español, tiene a cross disposición servicios gratuitos de asistencia lingüística. Ll[facility]tering [facility] [phone].    We comply with applicable federal civil rights laws and [place] laws. We do not discriminate on the basis of race, color, national origin, age, disability, sex, sexual orientation, or gender identity.            Thank you!     [facility]. Our goal is always to provide you with excellent care. Hearing back from our patients is one way we can continue to improve our services. Please take a few minutes to complete the written survey that you may receive in the mail after your visit with us. Thank you!             Your Updated Medication List - Protect others around you: Learn how to safely use, store and throw away your medicines at www.disposemymeds.org.          This list is accurate as of: 10/9/17  8:53 AM.  Always use your most recent med list.                   Brand Name Dispense Instructions for use Diagnosis    ALLEGRA 180 MG tablet   Generic drug:  fexofenadine     90 tablet    Take 1 tablet by mouth daily.        buPROPion 150 MG 24 hr tablet    WELLBUTRIN XL    180 tablet    Take  1 tab daily for 2 weeks and then increase it to 2 tabs daily.    Major depressive disorder, single episode, moderate (H)       Calcium-Vitamin D-Vitamin K 500-100-40 MG-UNT-MCG Chew     90 tablet         DAILY MULTIVITAMIN PO      Take 1 tablet by mouth daily        ESTROVEN PO           fluticasone 50 MCG/ACT spray    FLONASE    16 g    Spray 2 sprays into both nostrils daily    Chronic rhinitis, unspecified type       ketotifen 0.025 % Soln ophthalmic solution    ZADITOR    1 Bottle    Place 1 drop into both eyes every 12 hours    Chronic allergic conjunctivitis       levothyroxine 100 MCG tablet    SYNTHROID/LEVOTHROID    90 tablet    Take 1 tablet (100 mcg) by mouth daily    Acquired hypothyroidism       order for DME     1 each    Equipment being ordered: left thumb spica.    De Quervain's disease (tenosynovitis)       simvastatin 10 MG tablet    ZOCOR    90 tablet    Take 1 tablet (10 mg) by mouth At Bedtime    Hyperlipidemia LDL goal <160       venlafaxine 37.5 MG 24 hr capsule    EFFEXOR-XR    14 capsule    Use 75 mg cap daily for 2 weeks and then use 37.5 mg cap daily for 2 weeks and then stop.    Major depressive disorder, single episode, moderate (H)

## 2017-10-10 ASSESSMENT — ANXIETY QUESTIONNAIRES: GAD7 TOTAL SCORE: 6

## 2017-10-11 LAB
COPATH REPORT: NORMAL
PAP: NORMAL

## 2017-10-13 LAB
FINAL DIAGNOSIS: NORMAL
HPV HR 12 DNA CVX QL NAA+PROBE: NEGATIVE
HPV16 DNA SPEC QL NAA+PROBE: NEGATIVE
HPV18 DNA SPEC QL NAA+PROBE: NEGATIVE
SPECIMEN DESCRIPTION: NORMAL

## 2017-11-13 ENCOUNTER — OFFICE VISIT (OUTPATIENT)
Dept: FAMILY MEDICINE | Facility: CLINIC | Age: 57
End: 2017-11-13
Payer: COMMERCIAL

## 2017-11-13 VITALS
RESPIRATION RATE: 16 BRPM | OXYGEN SATURATION: 100 % | WEIGHT: 148 LBS | BODY MASS INDEX: 27.23 KG/M2 | HEART RATE: 72 BPM | HEIGHT: 62 IN | TEMPERATURE: 98.3 F | DIASTOLIC BLOOD PRESSURE: 70 MMHG | SYSTOLIC BLOOD PRESSURE: 122 MMHG

## 2017-11-13 DIAGNOSIS — N95.1 MENOPAUSAL SYNDROME (HOT FLASHES): Primary | ICD-10-CM

## 2017-11-13 PROCEDURE — 99214 OFFICE O/P EST MOD 30 MIN: CPT | Performed by: FAMILY MEDICINE

## 2017-11-13 RX ORDER — GABAPENTIN 100 MG/1
100 CAPSULE ORAL AT BEDTIME
Qty: 30 CAPSULE | Refills: 0 | Status: SHIPPED | OUTPATIENT
Start: 2017-11-13 | End: 2017-11-28

## 2017-11-13 ASSESSMENT — ANXIETY QUESTIONNAIRES
IF YOU CHECKED OFF ANY PROBLEMS ON THIS QUESTIONNAIRE, HOW DIFFICULT HAVE THESE PROBLEMS MADE IT FOR YOU TO DO YOUR WORK, TAKE CARE OF THINGS AT HOME, OR GET ALONG WITH OTHER PEOPLE: NOT DIFFICULT AT ALL
5. BEING SO RESTLESS THAT IT IS HARD TO SIT STILL: NOT AT ALL
1. FEELING NERVOUS, ANXIOUS, OR ON EDGE: SEVERAL DAYS
2. NOT BEING ABLE TO STOP OR CONTROL WORRYING: SEVERAL DAYS
6. BECOMING EASILY ANNOYED OR IRRITABLE: SEVERAL DAYS
GAD7 TOTAL SCORE: 4
7. FEELING AFRAID AS IF SOMETHING AWFUL MIGHT HAPPEN: NOT AT ALL
3. WORRYING TOO MUCH ABOUT DIFFERENT THINGS: SEVERAL DAYS

## 2017-11-13 ASSESSMENT — PATIENT HEALTH QUESTIONNAIRE - PHQ9
5. POOR APPETITE OR OVEREATING: NOT AT ALL
SUM OF ALL RESPONSES TO PHQ QUESTIONS 1-9: 3

## 2017-11-13 NOTE — PROGRESS NOTES
"Chief Complaint   Patient presents with     Recheck Medication       Initial /70  Pulse 72  Temp 98.3  F (36.8  C)  Resp 16  Ht 5' 2\" (1.575 m)  Wt 148 lb (67.1 kg)  LMP 08/16/2010  SpO2 100%  BMI 27.07 kg/m2 Estimated body mass index is 27.07 kg/(m^2) as calculated from the following:    Height as of this encounter: 5' 2\" (1.575 m).    Weight as of this encounter: 148 lb (67.1 kg).  Medication Reconciliation: complete. TRENT Meneses LPN          SUBJECTIVE:   Allie Thao is a 57 year old female who presents to clinic today for the following health issues:      Medication Followup off Effexor - on Wellbutrin     Taking Medication as prescribed: yes    Side Effects:   Having hot flashes and then cold on and off    Medication Helping Symptoms:  Not sure - even feels more anxious             On the last visit venlafaxine was discontinued as the patient had reported decreased libido. Wellbutrin dose was increased from 150 mg to 300 mg XL daily. The patient denies any worsening of depression or anxiety. Mood is stable.  She went on a trip to [place]. Reports of improvement in Libido.   She has noticed that in the last few weeks she has been getting more hot flashes and sweating. Breaks out in a sweat about 5-6 times a day and it is severe. It was happening prior to stopping venlafaxine but not as bad as now.        Problem list and histories reviewed & adjusted, as indicated.  Additional history: as documented    Patient Active Problem List   Diagnosis     Osteoarthritis cervical spine     Allergic rhinitis     History of basal cell carcinoma     Idiopathic urticaria     Hashimoto's thyroiditis     HYPERLIPIDEMIA LDL GOAL <160     Moderate Depression [296.22]     Flat epithelial atypia of breast     Advanced directives, counseling/discussion     Acquired hypothyroidism     Chronic rhinitis, unspecified type     Past Surgical History:   Procedure Laterality Date     COLONOSCOPY  8/2010    4 mm polyps mid " sigmoid and mid descending colon, repeat 5 years     DEXA  10/2010    T score lumbar 0.8, femoral neck -0.8/-0.2 with BMD 0.975     HC EXC MALIG SKIN LESION TRUNK/ARM/LEG <=0.5 CM  2005    excision basal cell right forearm     HC EXCISION BREAST LESION W XRAY MARKER, OPEN SINGLE  2010    Right breast excisional biopsy: Focal complex sclerosing lesion, focal columnar cell hyperplasia, adenosis with microcalcifications, no atypia     HC MRI CERVICAL SPINE W/O CONTRAST  10/2009    mulitilevel disc and facet joint denerative changes, mild spinal stenosis lower cervical levels, varying degrees of foraminal stenosis rt>lt (especially C4-5)     HC NCS MOTOR W/O F-WAVE, EACH NERVE  2009    mild left and very mild right median nerve neuropathy     HC REPAIR INTERMED, WOUND TRUNK/ARM/LEG 7.6-12 CM  1970s    complex closure right knee wound     MRA ANGIOGRAM BRAIN & MRI BRAIN W/O CONTRAST  2013    normal     SONO PELVIS COMPLETE  2013    2 small unterine myoma (12 and 14 mm) NOT approximating endometrium, 4  mm endoemtrial thickness       Social History   Substance Use Topics     Smoking status: Former Smoker     Packs/day: 1.50     Years: 11.00     Types: Cigarettes     Quit date: 1988     Smokeless tobacco: Never Used     Alcohol use 0.0 oz/week     0 Standard drinks or equivalent per week      Comment: 0-3 beers a week     Family History   Problem Relation Age of Onset     Lipids Mother      CANCER Mother      several basal cell skin cancers     HEART DISEASE Mother      atrial fibrillation/pacemaker, rheumatic heart disease - valve replacement     GASTROINTESTINAL DISEASE Mother      GI bleeding from gastritis/diverticulitis     Alcohol/Drug Father      Respiratory Father      COPD,  66, smoker     Alzheimer Disease Maternal Grandmother      onset early 80s     Cancer - colorectal Maternal Grandfather       age 80     Breast Cancer Maternal Aunt      onset early 60s     Blood Disease Sister       "thrombophlebitis, negative factor testing     Connective Tissue Disorder Brother      bilateral hip replacement 40s for slipped capital epiphyses     Neurologic Disorder Other      nieces/nephews with Tourette's     Osteoarthritis Sister      hands         Current Outpatient Prescriptions   Medication Sig Dispense Refill     gabapentin (NEURONTIN) 100 MG capsule Take 1 capsule (100 mg) by mouth At Bedtime 30 capsule 0     Nutritional Supplements (ESTROVEN PO)        simvastatin (ZOCOR) 10 MG tablet Take 1 tablet (10 mg) by mouth At Bedtime 90 tablet 3     levothyroxine (SYNTHROID/LEVOTHROID) 100 MCG tablet Take 1 tablet (100 mcg) by mouth daily 90 tablet 3     fluticasone (FLONASE) 50 MCG/ACT spray Spray 2 sprays into both nostrils daily 16 g 11     buPROPion (WELLBUTRIN XL) 150 MG 24 hr tablet Take 1 tab daily for 2 weeks and then increase it to 2 tabs daily. 180 tablet 1     ketotifen (ZADITOR) 0.025 % SOLN Place 1 drop into both eyes every 12 hours 1 Bottle 3     Calcium-Vitamin D-Vitamin K 500-100-40 MG-UNT-MCG CHEW  90 tablet      Multiple Vitamin (DAILY MULTIVITAMIN PO) Take 1 tablet by mouth daily       fexofenadine (ALLEGRA) 180 MG tablet Take 1 tablet by mouth daily. 90 tablet 3     Allergies   Allergen Reactions     Cats      Ragweeds          Reviewed and updated as needed this visit by clinical staffTobacco  Allergies  Meds  Surg Hx  Fam Hx  Soc Hx      Reviewed and updated as needed this visit by Provider         ROS:  C: NEGATIVE for fever, chills, change in weight  E/M: NEGATIVE for ear, mouth and throat problems  R: NEGATIVE for significant cough or SOB  CV: NEGATIVE for chest pain, palpitations or peripheral edema    OBJECTIVE:                                                    /70  Pulse 72  Temp 98.3  F (36.8  C)  Resp 16  Ht 5' 2\" (1.575 m)  Wt 148 lb (67.1 kg)  LMP 08/16/2010  SpO2 100%  BMI 27.07 kg/m2  Body mass index is 27.07 kg/(m^2).   GENERAL: healthy, alert, well " nourished, well hydrated, no distress  HENT: ear canals- normal; TMs- normal; Nose- normal; Mouth- no ulcers, no lesions  NECK: no tenderness, no adenopathy, no asymmetry, no masses, no stiffness; thyroid- normal to palpation  RESP: lungs clear to auscultation - no rales, no rhonchi, no wheezes  CV: regular rates and rhythm, normal S1 S2, no S3 or S4 and no murmur, no click or rub -  ABDOMEN: soft, no tenderness, no  hepatosplenomegaly, no masses, normal bowel sounds         ASSESSMENT/PLAN:                                                        ICD-10-CM    1. Menopausal syndrome (hot flashes) N95.1 gabapentin (NEURONTIN) 100 MG capsule     Recommended a trial of gabapentin 100 milligrams at night for hot flashes. If that fails, we may need to try a different SSRI or clonidine. May use glycopyrrolate for excessive sweat if needed.   All the different medication classes that could be used for these symptoms were discussed with the patient.  Side effect profile of gabapentin discussed. Recommended to notify me back in the next 1-2 weeks if she is tolerating the medication well or not.  Follow up with Provider - 2-4 weeks as needed     Donna Cheatham MD  [facility] [place]

## 2017-11-13 NOTE — MR AVS SNAPSHOT
"              After Visit Summary   11/13/2017    Allie Thao    MRN: [id number]           Patient Information     Date Of Birth          1960        Visit Information        Provider Department      11/13/2017 1:40 PM Donna Cheatham MD [facility] [place]        Today's Diagnoses     Menopausal syndrome (hot flashes)    -  1       Follow-ups after your visit        Follow-up notes from your care team     Return in about 2 weeks (around 11/27/2017), or if symptoms worsen or fail to improve.      Who to contact     If you have questions or need follow up information about today's clinic visit or your schedule please contact [facility] directly at [phone].  Normal or non-critical lab and imaging results will be communicated to you by YadaHomehart, letter or phone within 4 business days after the clinic has received the results. If you do not hear from us within 7 days, please contact the clinic through YadaHomehart or phone. If you have a critical or abnormal lab result, we will notify you by phone as soon as possible.  Submit refill requests through HeadCount or call your pharmacy and they will forward the refill request to us. Please allow 3 business days for your refill to be completed.          Additional Information About Your Visit        MyChart Information     HeadCount gives you secure access to your electronic health record. If you see a primary care provider, you can also send messages to your care team and make appointments. If you have questions, please call your primary care clinic.  If you do not have a primary care provider, please call [phone] and they will assist you.        Care EveryWhere ID     This is your Care EveryWhere ID. This could be used by other organizations to access your [place] medical records  [id number]        Your Vitals Were     Pulse Temperature Respirations Height Last Period Pulse Oximetry    72 98.3  F (36.8  C) 16 5' 2\" (1.575 m) 08/16/2010 " 100%    BMI (Body Mass Index)                   27.07 kg/m2            Blood Pressure from Last 3 Encounters:   11/13/17 122/70   10/09/17 130/78   03/17/17 120/78    Weight from Last 3 Encounters:   11/13/17 148 lb (67.1 kg)   10/09/17 148 lb (67.1 kg)   03/17/17 156 lb (70.8 kg)              Today, you had the following     No orders found for display         Today's Medication Changes          These changes are accurate as of: 11/13/17  2:15 PM.  If you have any questions, ask your nurse or doctor.               Start taking these medicines.        Dose/Directions    gabapentin 100 MG capsule   Commonly known as:  NEURONTIN   Used for:  Menopausal syndrome (hot flashes)   Started by:  Donna Cheatham MD        Dose:  100 mg   Take 1 capsule (100 mg) by mouth At Bedtime   Quantity:  30 capsule   Refills:  0         Stop taking these medicines if you haven't already. Please contact your care team if you have questions.     order for DME   Stopped by:  Donna Cheatham MD                Where to get your medicines      These medications [facility] [place] [address] [place] [facility] [place] [facility] [address] [facility] [address] [facility] [address]     Phone:  [phone]     gabapentin 100 MG capsule                Primary Care Provider Office Phone # Fax #    Donna Cheatham [phone]       [address]  [place] [address]        Equal Access to Services     [facility] AH: Hadii brent chaudhryo Solesly, waaxda luqadaha, qaybta kaalmada adeegyada, waxay rob ariza adebrittany lozoya. So [facility] [phone].    ATENCIÓN: Si habla español, tiene a cross disposición servicios gratuitos de asistencia lingüística. Llame al [phone].    We comply with applicable federal civil rights laws and [place] laws. We do not discriminate on the basis of race, color, national origin, age, disability, sex, sexual orientation, or gender identity.            Thank you!     Thank you for  choosing [facility] [place]  for your care. Our goal is always to provide you with excellent care. Hearing back from our patients is one way we can continue to improve our services. Please take a few minutes to complete the written survey that you may receive in the mail after your visit with us. Thank you!             Your Updated Medication List - Protect others around you: Learn how to safely use, store and throw away your medicines at www.disposemymeds.org.          This list is accurate as of: 11/13/17  2:15 PM.  Always use your most recent med list.                   Brand Name Dispense Instructions for use Diagnosis    ALLEGRA 180 MG tablet   Generic drug:  fexofenadine     90 tablet    Take 1 tablet by mouth daily.        buPROPion 150 MG 24 hr tablet    WELLBUTRIN XL    180 tablet    Take 1 tab daily for 2 weeks and then increase it to 2 tabs daily.    Major depressive disorder, single episode, moderate (H)       Calcium-Vitamin D-Vitamin K 500-100-40 MG-UNT-MCG Chew     90 tablet         DAILY MULTIVITAMIN PO      Take 1 tablet by mouth daily        ESTROVEN PO           fluticasone 50 MCG/ACT spray    FLONASE    16 g    Spray 2 sprays into both nostrils daily    Chronic rhinitis, unspecified type       gabapentin 100 MG capsule    NEURONTIN    30 capsule    Take 1 capsule (100 mg) by mouth At Bedtime    Menopausal syndrome (hot flashes)       ketotifen 0.025 % Soln ophthalmic solution    ZADITOR    1 Bottle    Place 1 drop into both eyes every 12 hours    Chronic allergic conjunctivitis       levothyroxine 100 MCG tablet    SYNTHROID/LEVOTHROID    90 tablet    Take 1 tablet (100 mcg) by mouth daily    Acquired hypothyroidism       simvastatin 10 MG tablet    ZOCOR    90 tablet    Take 1 tablet (10 mg) by mouth At Bedtime    Hyperlipidemia LDL goal <160

## 2017-11-14 ASSESSMENT — ANXIETY QUESTIONNAIRES: GAD7 TOTAL SCORE: 4

## 2017-11-27 ENCOUNTER — TELEPHONE (OUTPATIENT)
Dept: FAMILY MEDICINE | Facility: CLINIC | Age: 57
End: 2017-11-27

## 2017-11-27 DIAGNOSIS — N95.1 HOT FLASH, MENOPAUSAL: Primary | ICD-10-CM

## 2017-11-27 NOTE — TELEPHONE ENCOUNTER
"patient calling to report that she may be having some side effects to the medication increase:  Having increased anger- road rage- she can tell the difference  Little things set her off- very irritable  Getting major hot flashes where she is totally wet and then a few minutes later she is cold  2 weeks ago today she started gabapentin- started one week later with the increased symptoms  patient states that anger has always been an issue in the past - feels that she was in control, but much worse since starting the gabapentin.  Patient would like to go off of this medication and try something different.    Advised that Dr. Cheatham is off today but may check her messages. Also advised that she needs to remember its not her and when she feels that irrational anger coming on - think \"this is the medication not the person\" take deep breaths.  Patient will wait for provider to respond.  Aylin Calderon RN  [facility]  [phone]        "

## 2017-11-28 RX ORDER — CLONIDINE HYDROCHLORIDE 0.1 MG/1
0.1 TABLET ORAL 2 TIMES DAILY
Qty: 180 TABLET | Refills: 3 | Status: SHIPPED | OUTPATIENT
Start: 2017-11-28 | End: 2018-01-09

## 2017-11-28 NOTE — TELEPHONE ENCOUNTER
Discontinue gabapentin.   Start a trial of clonidine 0.1 mg BID. It is a blood pressure lowering medication which also can be used for hot flashes. I hope it works well for her.  Please inform the patient that she should not skip a dose of the medication, as it causes rebound hypertension. I would like to see her back in 1 week for a recheck on her blood pressure and symptoms, after starting the medication.    Donna Cheatham MD  [facility] [place]

## 2017-11-28 NOTE — TELEPHONE ENCOUNTER
Left detailed message (we have consent) informing of below. Encouraged patient to call with any questions or concerns.   Loretta Chris RN   [facility] - Triage

## 2017-12-05 ENCOUNTER — OFFICE VISIT (OUTPATIENT)
Dept: FAMILY MEDICINE | Facility: CLINIC | Age: 57
End: 2017-12-05
Payer: COMMERCIAL

## 2017-12-05 VITALS
SYSTOLIC BLOOD PRESSURE: 109 MMHG | HEART RATE: 85 BPM | DIASTOLIC BLOOD PRESSURE: 73 MMHG | HEIGHT: 62 IN | OXYGEN SATURATION: 97 % | WEIGHT: 148 LBS | RESPIRATION RATE: 14 BRPM | BODY MASS INDEX: 27.23 KG/M2 | TEMPERATURE: 97.7 F

## 2017-12-05 DIAGNOSIS — N95.1 MENOPAUSAL SYNDROME (HOT FLASHES): ICD-10-CM

## 2017-12-05 DIAGNOSIS — F32.1 MAJOR DEPRESSIVE DISORDER, SINGLE EPISODE, MODERATE (H): ICD-10-CM

## 2017-12-05 DIAGNOSIS — F41.9 ANXIETY: Primary | ICD-10-CM

## 2017-12-05 DIAGNOSIS — M79.642 PAIN OF LEFT HAND: ICD-10-CM

## 2017-12-05 PROCEDURE — 99214 OFFICE O/P EST MOD 30 MIN: CPT | Performed by: FAMILY MEDICINE

## 2017-12-05 RX ORDER — VENLAFAXINE HYDROCHLORIDE 37.5 MG/1
CAPSULE, EXTENDED RELEASE ORAL
Qty: 30 CAPSULE | Refills: 1 | Status: SHIPPED | OUTPATIENT
Start: 2017-12-05 | End: 2018-01-09

## 2017-12-05 ASSESSMENT — ANXIETY QUESTIONNAIRES
7. FEELING AFRAID AS IF SOMETHING AWFUL MIGHT HAPPEN: SEVERAL DAYS
2. NOT BEING ABLE TO STOP OR CONTROL WORRYING: SEVERAL DAYS
6. BECOMING EASILY ANNOYED OR IRRITABLE: NEARLY EVERY DAY
5. BEING SO RESTLESS THAT IT IS HARD TO SIT STILL: NOT AT ALL
1. FEELING NERVOUS, ANXIOUS, OR ON EDGE: SEVERAL DAYS
GAD7 TOTAL SCORE: 7
3. WORRYING TOO MUCH ABOUT DIFFERENT THINGS: SEVERAL DAYS
IF YOU CHECKED OFF ANY PROBLEMS ON THIS QUESTIONNAIRE, HOW DIFFICULT HAVE THESE PROBLEMS MADE IT FOR YOU TO DO YOUR WORK, TAKE CARE OF THINGS AT HOME, OR GET ALONG WITH OTHER PEOPLE: SOMEWHAT DIFFICULT

## 2017-12-05 ASSESSMENT — PATIENT HEALTH QUESTIONNAIRE - PHQ9
SUM OF ALL RESPONSES TO PHQ QUESTIONS 1-9: 4
5. POOR APPETITE OR OVEREATING: NOT AT ALL

## 2017-12-05 NOTE — MR AVS SNAPSHOT
"              After Visit Summary   12/5/2017    Allie Thao    MRN: [id number]           Patient Information     Date Of Birth          1960        Visit Information        Provider Department      12/5/2017 1:40 PM Donna Cheatham MD [facility] [place]        Today's Diagnoses     Anxiety    -  1    Moderate Depression [296.22]        Menopausal syndrome (hot flashes)           Follow-ups after your visit        Follow-up notes from your care team     Return in about 1 month (around 1/5/2018) for Mood Recheck.      Your next 10 appointments already scheduled     Dec 06, 2017  4:40 PM CST   MA SCREENING DIGITAL BILATERAL with [id number]   [facility]    [address] [place] [address], [address]  [facility] [address] [phone]           Do not use any powder, lotion or deodorant under your arms or on your breast. If you do, we will ask you to remove it before your exam.  Wear comfortable, two-piece clothing.  If you have any allergies, tell your care team.  Bring any previous mammograms from other facilities or have them mailed to the breast center. Three-dimensional (3D) mammograms are available at [facility] locations in [facility], [place], [facility], [place], [place], and [place]. [facility] locations include [place] [facility] in [place]. Benefits of 3D mammograms include: - Improved rate of cancer detection - Decreases your chance of having to go back for more tests, which means fewer: - \"False-positive\" results (This means that there is an abnormal area but it isn't cancer.) - Invasive testing procedures, such as a biopsy or surgery - Can provide clearer images of the breast if you have dense breast tissue. 3D mammography is an optional exam that anyone can have with a 2D mammogram. It doesn't replace or take the place of a 2D mammogram. 2D mammograms remain an effective screening test for all women.  " "Not all insurance companies cover the cost of a 3D mammogram. Check with your insurance.              Who to contact     If you have questions or need follow up information about today's clinic visit or your schedule please contact [facility] [place] directly at [phone].  Normal or non-critical lab and imaging results will be communicated to you by Youcruithart, letter or phone within 4 business days after the clinic has received the results. If you do not hear from us within 7 days, please contact the clinic through Youcruithart or phone. If you have a critical or abnormal lab result, we will notify you by phone as soon as possible.  Submit refill requests through InfraSearch or call your pharmacy and they will forward the refill request to us. Please allow 3 business days for your refill to be completed.          Additional Information About Your Visit        Youcruithart Information     InfraSearch gives you secure access to your electronic health record. If you see a primary care provider, you can also send messages to your care team and make appointments. If you have questions, please call your primary care clinic.  If you do not have a primary care provider, please call [phone] and they will assist you.        Care EveryWhere ID     This is your Care EveryWhere ID. This could be used by other organizations to access your [place] medical records  [id number]        Your Vitals Were     Pulse Temperature Respirations Height Last Period Pulse Oximetry    85 97.7  F (36.5  C) (Tympanic) 14 5' 2\" (1.575 m) 08/16/2010 97%    BMI (Body Mass Index)                   27.07 kg/m2            Blood Pressure from Last 3 Encounters:   12/05/17 109/73   11/13/17 122/70   10/09/17 130/78    Weight from Last 3 Encounters:   12/05/17 148 lb (67.1 kg)   11/13/17 148 lb (67.1 kg)   10/09/17 148 lb (67.1 kg)              Today, you had the following     No orders found for display         Today's Medication Changes          These " changes are accurate as of: 12/5/17  1:57 PM.  If you have any questions, ask your nurse or doctor.               Start taking these medicines.        Dose/Directions    venlafaxine 37.5 MG 24 hr capsule   Commonly known as:  EFFEXOR-XR   Used for:  Anxiety   Started by:  Donna Cheatham MD        Take 1 capsule daily for 14 days, then take 2 capsules daily.   Quantity:  30 capsule   Refills:  1            Where to get your medicines      These medications were sent to [place] [address] [place] [facility] [place] [facility] - [address]  [address] [facility] [address]     Phone:  [phone]     venlafaxine 37.5 MG 24 hr capsule                Primary Care Provider Office Phone # Fax #    Donna Cheatham [phone]       [address]  [place] [address]        Equal Access to Services     [facility]: Hadii aad ku hadasho Soomaali, waaxda luqadaha, qaybta kaalmada adeegyada, waxay jamilain hayaan lizette polo . So [facility] [phone].    ATENCIÓN: Si habla español, tiene a cross disposición servicios gratuitos de asistencia lingüística. Llame al [phone].    We comply with applicable federal civil rights laws and [place] laws. We do not discriminate on the basis of race, color, national origin, age, disability, sex, sexual orientation, or gender identity.            Thank you!     Thank you for choosing [facility] [place]  for your care. Our goal is always to provide you with excellent care. Hearing back from our patients is one way we can continue to improve our services. Please take a few minutes to complete the written survey that you may receive in the mail after your visit with us. Thank you!             Your Updated Medication List - Protect others around you: Learn how to safely use, store and throw away your medicines at www.disposemymeds.org.          This list is accurate as of: 12/5/17  1:57 PM.  Always use your most recent med list.                    Brand Name Dispense Instructions for use Diagnosis    ALLEGRA 180 MG tablet   Generic drug:  fexofenadine     90 tablet    Take 1 tablet by mouth daily.        buPROPion 150 MG 24 hr tablet    WELLBUTRIN XL    180 tablet    Take 1 tab daily for 2 weeks and then increase it to 2 tabs daily.    Major depressive disorder, single episode, moderate (H)       Calcium-Vitamin D-Vitamin K 500-100-40 MG-UNT-MCG Chew     90 tablet         cloNIDine 0.1 MG tablet    CATAPRES    180 tablet    Take 1 tablet (0.1 mg) by mouth 2 times daily    Hot flash, menopausal       DAILY MULTIVITAMIN PO      Take 1 tablet by mouth daily        ESTROVEN PO           fluticasone 50 MCG/ACT spray    FLONASE    16 g    Spray 2 sprays into both nostrils daily    Chronic rhinitis, unspecified type       ketotifen 0.025 % Soln ophthalmic solution    ZADITOR    1 Bottle    Place 1 drop into both eyes every 12 hours    Chronic allergic conjunctivitis       levothyroxine 100 MCG tablet    SYNTHROID/LEVOTHROID    90 tablet    Take 1 tablet (100 mcg) by mouth daily    Acquired hypothyroidism       simvastatin 10 MG tablet    ZOCOR    90 tablet    Take 1 tablet (10 mg) by mouth At Bedtime    Hyperlipidemia LDL goal <160       venlafaxine 37.5 MG 24 hr capsule    EFFEXOR-XR    30 capsule    Take 1 capsule daily for 14 days, then take 2 capsules daily.    Anxiety

## 2017-12-05 NOTE — PROGRESS NOTES
SUBJECTIVE:   Allie Thao is a 57 year old female who presents to clinic today for the following health issues:      Medication Followup of clonidine    Taking Medication as prescribed: yes    Side Effects:  Pt reports this medication is better than the previous medication, though still cranky. Some night sweats.      Medication Helping Symptoms:  Pt thinks it is.      She thinks that her mood is progressively getting worse.  Feels more anxious.    PHQ-9 SCORE 10/9/2017 11/13/2017 12/5/2017   Total Score - - -   Total Score MyChart - - -   Total Score 3 3 4     REGINA-7 SCORE 10/9/2017 11/13/2017 12/5/2017   Total Score - - -   Total Score 6 4 7       Patient also reports of pain in the left hand.        Problem list and histories reviewed & adjusted, as indicated.  Additional history: as documented    Patient Active Problem List   Diagnosis     Osteoarthritis cervical spine     Allergic rhinitis     History of basal cell carcinoma     Idiopathic urticaria     Hashimoto's thyroiditis     HYPERLIPIDEMIA LDL GOAL <160     Moderate Depression [296.22]     Flat epithelial atypia of breast     Advanced directives, counseling/discussion     Acquired hypothyroidism     Chronic rhinitis, unspecified type     Past Surgical History:   Procedure Laterality Date     COLONOSCOPY  8/2010    4 mm polyps mid sigmoid and mid descending colon, repeat 5 years     DEXA  10/2010    T score lumbar 0.8, femoral neck -0.8/-0.2 with BMD 0.975     HC EXC MALIG SKIN LESION TRUNK/ARM/LEG <=0.5 CM  4/2005    excision basal cell right forearm     HC EXCISION BREAST LESION W XRAY MARKER, OPEN SINGLE  2/2010    Right breast excisional biopsy: Focal complex sclerosing lesion, focal columnar cell hyperplasia, adenosis with microcalcifications, no atypia     HC MRI CERVICAL SPINE W/O CONTRAST  10/2009    mulitilevel disc and facet joint denerative changes, mild spinal stenosis lower cervical levels, varying degrees of foraminal stenosis rt>lt  (especially C4-5)     HC NCS MOTOR W/O F-WAVE, EACH NERVE  2009    mild left and very mild right median nerve neuropathy     HC REPAIR INTERMED, WOUND TRUNK/ARM/LEG 7.6-12 CM  1970s    complex closure right knee wound     MRA ANGIOGRAM BRAIN & MRI BRAIN W/O CONTRAST  2013    normal     SONO PELVIS COMPLETE  2013    2 small unterine myoma (12 and 14 mm) NOT approximating endometrium, 4  mm endoemtrial thickness       Social History   Substance Use Topics     Smoking status: Former Smoker     Packs/day: 1.50     Years: 11.00     Types: Cigarettes     Quit date: 1988     Smokeless tobacco: Never Used     Alcohol use 0.0 oz/week     0 Standard drinks or equivalent per week      Comment: 0-3 beers a week     Family History   Problem Relation Age of Onset     Lipids Mother      CANCER Mother      several basal cell skin cancers     HEART DISEASE Mother      atrial fibrillation/pacemaker, rheumatic heart disease - valve replacement     GASTROINTESTINAL DISEASE Mother      GI bleeding from gastritis/diverticulitis     Alcohol/Drug Father      Respiratory Father      COPD,  66, smoker     Alzheimer Disease Maternal Grandmother      onset early 80s     Cancer - colorectal Maternal Grandfather       age 80     Breast Cancer Maternal Aunt      onset early 60s     Blood Disease Sister      thrombophlebitis, negative factor testing     Connective Tissue Disorder Brother      bilateral hip replacement 40s for slipped capital epiphyses     Neurologic Disorder Other      nieces/nephews with Tourette's     Osteoarthritis Sister      hands         Current Outpatient Prescriptions   Medication Sig Dispense Refill     venlafaxine (EFFEXOR-XR) 37.5 MG 24 hr capsule Take 1 capsule daily for 14 days, then take 2 capsules daily. 30 capsule 1     cloNIDine (CATAPRES) 0.1 MG tablet Take 1 tablet (0.1 mg) by mouth 2 times daily 180 tablet 3     Nutritional Supplements (ESTROVEN PO)        simvastatin (ZOCOR) 10 MG tablet  "Take 1 tablet (10 mg) by mouth At Bedtime 90 tablet 3     levothyroxine (SYNTHROID/LEVOTHROID) 100 MCG tablet Take 1 tablet (100 mcg) by mouth daily 90 tablet 3     fluticasone (FLONASE) 50 MCG/ACT spray Spray 2 sprays into both nostrils daily 16 g 11     buPROPion (WELLBUTRIN XL) 150 MG 24 hr tablet Take 1 tab daily for 2 weeks and then increase it to 2 tabs daily. 180 tablet 1     ketotifen (ZADITOR) 0.025 % SOLN Place 1 drop into both eyes every 12 hours 1 Bottle 3     Calcium-Vitamin D-Vitamin K 500-100-40 MG-UNT-MCG CHEW  90 tablet      Multiple Vitamin (DAILY MULTIVITAMIN PO) Take 1 tablet by mouth daily       fexofenadine (ALLEGRA) 180 MG tablet Take 1 tablet by mouth daily. 90 tablet 3     Allergies   Allergen Reactions     Cats      Ragweeds          Reviewed and updated as needed this visit by clinical staff     Reviewed and updated as needed this visit by Provider         ROS:  C: NEGATIVE for fever, chills, change in weight  E/M: NEGATIVE for ear, mouth and throat problems  R: NEGATIVE for significant cough or SOB  CV: NEGATIVE for chest pain, palpitations or peripheral edema    OBJECTIVE:                                                    /73 (BP Location: Left arm, Patient Position: Chair, Cuff Size: Adult Regular)  Pulse 85  Temp 97.7  F (36.5  C) (Tympanic)  Resp 14  Ht 5' 2\" (1.575 m)  Wt 148 lb (67.1 kg)  LMP 08/16/2010  SpO2 97%  BMI 27.07 kg/m2  Body mass index is 27.07 kg/(m^2).   GENERAL: healthy, alert, well nourished, well hydrated, no distress  HENT: ear canals- normal; TMs- normal; Nose- normal; Mouth- no ulcers, no lesions  NECK: no tenderness, no adenopathy, no asymmetry, no masses, no stiffness; thyroid- normal to palpation  RESP: lungs clear to auscultation - no rales, no rhonchi, no wheezes  CV: regular rates and rhythm, normal S1 S2, no S3 or S4 and no murmur, no click or rub -  ABDOMEN: soft, no tenderness, no  hepatosplenomegaly, no masses, normal bowel sounds  PSYCH: " Alert and oriented times 3; speech- coherent , normal rate and volume; able to articulate logical thoughts, able to abstract reason, no tangential thoughts, no hallucinations or delusions, affect- normal         ASSESSMENT/PLAN:                                                      1. Anxiety  Recommended to start taking Effexor XR.  Side effect profile reviewed.  - venlafaxine (EFFEXOR-XR) 37.5 MG 24 hr capsule; Take 1 capsule daily for 14 days, then take 2 capsules daily.  Dispense: 30 capsule; Refill: 1    2. Moderate Depression [296.22]  As above.  - venlafaxine (EFFEXOR-XR) 37.5 MG 24 hr capsule; Take 1 capsule daily for 14 days, then take 2 capsules daily.  Dispense: 30 capsule; Refill: 1    3. Menopausal syndrome (hot flashes)  Resume clonidine.  Adding on Effexor.  Follow-up in 1 month recommended  - venlafaxine (EFFEXOR-XR) 37.5 MG 24 hr capsule; Take 1 capsule daily for 14 days, then take 2 capsules daily.  Dispense: 30 capsule; Refill: 1    4. Pain of left hand  Referring the patient to orthopedics for evaluation of hand pain.  - ORTHOPEDICS ADULT REFERRAL           Donna Cheatham MD  [facility] [place]

## 2017-12-06 ENCOUNTER — HOSPITAL ENCOUNTER (OUTPATIENT)
Dept: MAMMOGRAPHY | Facility: CLINIC | Age: 57
Discharge: HOME | End: 2017-12-06
Attending: FAMILY MEDICINE | Admitting: FAMILY MEDICINE
Payer: COMMERCIAL

## 2017-12-06 DIAGNOSIS — Z12.31 ENCOUNTER FOR SCREENING MAMMOGRAM FOR BREAST CANCER: ICD-10-CM

## 2017-12-06 PROCEDURE — 77063 BREAST TOMOSYNTHESIS BI: CPT

## 2017-12-06 PROCEDURE — G0202 SCR MAMMO BI INCL CAD: HCPCS

## 2017-12-06 ASSESSMENT — ANXIETY QUESTIONNAIRES: GAD7 TOTAL SCORE: 7

## 2017-12-11 ENCOUNTER — TRANSFERRED RECORDS (OUTPATIENT)
Dept: HEALTH INFORMATION MANAGEMENT | Facility: CLINIC | Age: 57
End: 2017-12-11

## 2018-01-09 ENCOUNTER — OFFICE VISIT (OUTPATIENT)
Dept: FAMILY MEDICINE | Facility: CLINIC | Age: 58
End: 2018-01-09
Payer: COMMERCIAL

## 2018-01-09 VITALS
HEART RATE: 72 BPM | TEMPERATURE: 97.2 F | HEIGHT: 62 IN | SYSTOLIC BLOOD PRESSURE: 108 MMHG | WEIGHT: 148 LBS | BODY MASS INDEX: 27.23 KG/M2 | OXYGEN SATURATION: 97 % | DIASTOLIC BLOOD PRESSURE: 78 MMHG

## 2018-01-09 DIAGNOSIS — N95.1 HOT FLASH, MENOPAUSAL: ICD-10-CM

## 2018-01-09 DIAGNOSIS — F32.1 MAJOR DEPRESSIVE DISORDER, SINGLE EPISODE, MODERATE (H): ICD-10-CM

## 2018-01-09 DIAGNOSIS — F41.9 ANXIETY: ICD-10-CM

## 2018-01-09 PROCEDURE — 99214 OFFICE O/P EST MOD 30 MIN: CPT | Performed by: FAMILY MEDICINE

## 2018-01-09 RX ORDER — VENLAFAXINE HYDROCHLORIDE 37.5 MG/1
CAPSULE, EXTENDED RELEASE ORAL
Qty: 90 CAPSULE | Refills: 1 | Status: SHIPPED | OUTPATIENT
Start: 2018-01-09 | End: 2018-03-27

## 2018-01-09 RX ORDER — CLONIDINE HYDROCHLORIDE 0.1 MG/1
0.1 TABLET ORAL 2 TIMES DAILY
Qty: 180 TABLET | Refills: 3 | Status: SHIPPED | OUTPATIENT
Start: 2018-01-09 | End: 2018-10-17

## 2018-01-09 RX ORDER — BUPROPION HYDROCHLORIDE 300 MG/1
TABLET ORAL
Qty: 90 TABLET | Refills: 1 | Status: SHIPPED | OUTPATIENT
Start: 2018-01-09 | End: 2018-08-07

## 2018-01-09 ASSESSMENT — ANXIETY QUESTIONNAIRES

## 2018-01-09 ASSESSMENT — PATIENT HEALTH QUESTIONNAIRE - PHQ9
SUM OF ALL RESPONSES TO PHQ QUESTIONS 1-9: 2
5. POOR APPETITE OR OVEREATING: NOT AT ALL

## 2018-01-09 NOTE — NURSING NOTE
"Chief Complaint   Patient presents with     Depression     Anxiety       Initial /78  Pulse 72  Temp 97.2  F (36.2  C) (Tympanic)  Ht 5' 2\" (1.575 m)  Wt 148 lb (67.1 kg)  LMP 08/16/2010  SpO2 97%  BMI 27.07 kg/m2 Estimated body mass index is 27.07 kg/(m^2) as calculated from the following:    Height as of this encounter: 5' 2\" (1.575 m).    Weight as of this encounter: 148 lb (67.1 kg).  Medication Reconciliation: complete  "

## 2018-01-09 NOTE — MR AVS SNAPSHOT
After Visit Summary   1/9/2018    Allie Thao    MRN: [id number]           Patient Information     Date Of Birth          1960        Visit Information        Provider Department      1/9/2018 5:20 PM [facility] MD [facility]        Today's Diagnoses     Moderate Depression [296.22]        Anxiety        Menopausal syndrome (hot flashes)        Hot flash, menopausal           Follow-ups after your visit        Follow-up notes from your care team     Return in about 5 months (around 6/9/2018) for Mood Recheck.      Who to contact     If you have questions or need follow up information about today's clinic visit or your schedule please contact [facility] directly at [phone].  Normal or non-critical lab and imaging results will be communicated to you by [employer], letter or phone within 4 business days after the clinic has received the results. If you do not hear from us within 7 days, please contact the clinic through [employer] or phone. If you have a critical or abnormal lab result, we will notify you by phone as soon as possible.  Submit refill requests through [employer] or call your pharmacy and they will forward the refill request to us. Please allow 3 business days for your refill to be completed.          Additional Information About Your Visit        MyChart Information     [employer] gives you secure access to your electronic health record. If you see a primary care provider, you can also send messages to your care team and make appointments. If you have questions, please call your primary care clinic.  If you do not have a primary care provider, please call [phone] and they will assist you.        Care EveryWhere ID     This is your Care EveryWhere ID. This could be used by other organizations to access your [place] medical records  [id number]        Your Vitals Were     Pulse Temperature Height Last Period Pulse Oximetry BMI (Body Mass  "Index)    72 97.2  F (36.2  C) (Tympanic) 5' 2\" (1.575 m) 08/16/2010 97% 27.07 kg/m2       Blood Pressure from Last 3 Encounters:   01/09/18 108/78   12/05/17 109/73   11/13/17 122/70    Weight from Last 3 Encounters:   01/09/18 148 lb (67.1 kg)   12/05/17 148 lb (67.1 kg)   11/13/17 148 lb (67.1 kg)              Today, you had the following     No orders found for display         Today's Medication Changes          These changes are accurate as of: 1/9/18  5:51 PM.  If you have any questions, ask your nurse or doctor.               These medicines have changed or have updated prescriptions.        Dose/Directions    buPROPion 300 MG 24 hr tablet   Commonly known as:  WELLBUTRIN XL   This may have changed:    - medication strength  - additional instructions   Used for:  Major depressive disorder, single episode, moderate (H)   Changed by:  Donna Cheatham MD        Take 1 tab daily   Quantity:  90 tablet   Refills:  1       venlafaxine 37.5 MG 24 hr capsule   Commonly known as:  EFFEXOR-XR   This may have changed:  additional instructions   Used for:  Anxiety, Major depressive disorder, single episode, moderate (H), Menopausal syndrome (hot flashes)   Changed by:  Donna Cheatham MD        Take 1 capsule daily   Quantity:  90 capsule   Refills:  1            Where to get your medicines      These medications were sent to [facility] - [address] [address]     Phone:  [phone]     buPROPion 300 MG 24 hr tablet    cloNIDine 0.1 MG tablet    venlafaxine 37.5 MG 24 hr capsule                Primary Care Provider Office Phone # Fax #    Donna Cheatham [phone]       [address]  [place] [address]        Equal Access to Services     GEORGIANA AVENDANO AH: Simón Whiting, olamide burgess, qamartell kaalrenetta coulter. [facility] [phone].    ATENCIÓN: Si habla " español, tiene a cross disposición servicios gratuitos de asistencia lingüística. Monika tapia [phone].    We comply with applicable federal civil rights laws and [place] laws. We do not discriminate on the basis of race, color, national origin, age, disability, sex, sexual orientation, or gender identity.            Thank you!     Thank you for choosing [facility] [place]  for your care. Our goal is always to provide you with excellent care. Hearing back from our patients is one way we can continue to improve our services. Please take a few minutes to complete the written survey that you may receive in the mail after your visit with us. Thank you!             Your Updated Medication List - Protect others around you: Learn how to safely use, store and throw away your medicines at www.disposemymeds.org.          This list is accurate as of: 1/9/18  5:51 PM.  Always use your most recent med list.                   Brand Name Dispense Instructions for use Diagnosis    ALLEGRA 180 MG tablet   Generic drug:  fexofenadine     90 tablet    Take 1 tablet by mouth daily.        buPROPion 300 MG 24 hr tablet    WELLBUTRIN XL    90 tablet    Take 1 tab daily    Major depressive disorder, single episode, moderate (H)       Calcium-Vitamin D-Vitamin K 500-100-40 MG-UNT-MCG Chew     90 tablet         cloNIDine 0.1 MG tablet    CATAPRES    180 tablet    Take 1 tablet (0.1 mg) by mouth 2 times daily    Hot flash, menopausal       DAILY MULTIVITAMIN PO      Take 1 tablet by mouth daily        ESTROVEN PO           fluticasone 50 MCG/ACT spray    FLONASE    16 g    Spray 2 sprays into both nostrils daily    Chronic rhinitis, unspecified type       ketotifen 0.025 % Soln ophthalmic solution    ZADITOR    1 Bottle    Place 1 drop into both eyes every 12 hours    Chronic allergic conjunctivitis       levothyroxine 100 MCG tablet    SYNTHROID/LEVOTHROID    90 tablet    Take 1 tablet (100 mcg) by mouth daily    Acquired  hypothyroidism       simvastatin 10 MG tablet    ZOCOR    90 tablet    Take 1 tablet (10 mg) by mouth At Bedtime    Hyperlipidemia LDL goal <160       venlafaxine 37.5 MG 24 hr capsule    EFFEXOR-XR    90 capsule    Take 1 capsule daily    Anxiety, Major depressive disorder, single episode, moderate (H), Menopausal syndrome (hot flashes)

## 2018-01-09 NOTE — PROGRESS NOTES
SUBJECTIVE:   Allie Thao is a 58 year old female who presents to clinic today for the following health issues:      Depression and Anxiety Follow-Up    Status since last visit: Improved     Other associated symptoms:None    Complicating factors:     Significant life event: No     Current substance abuse: None    PHQ-9 Score and MyChart F/U Questions 11/13/2017 12/5/2017 1/9/2018   Total Score 3 4 2   Q9: Suicide Ideation Not at all Not at all Not at all     REGINA-7 SCORE 11/13/2017 12/5/2017 1/9/2018   Total Score - - -   Total Score 4 7 0       PHQ-9  English  PHQ-9   Any Language  GAD7  Suicide Assessment Five-step Evaluation and Treatment (SAFE-T)      Amount of exercise or physical activity: 2-3 days/week for an average of 30-45 minutes    Problems taking medications regularly: No    Medication side effects: none    Diet: regular (no restrictions)    Reports of improvement in hot flashes.  She is tolerating the current medications well.  Tells that Effexor is affecting her libido but she would still like to be on the medication.        Problem list and histories reviewed & adjusted, as indicated.  Additional history: as documented    Patient Active Problem List   Diagnosis     Osteoarthritis cervical spine     Allergic rhinitis     History of basal cell carcinoma     Idiopathic urticaria     Hashimoto's thyroiditis     HYPERLIPIDEMIA LDL GOAL <160     Moderate Depression [296.22]     Flat epithelial atypia of breast     Advanced directives, counseling/discussion     Acquired hypothyroidism     Chronic rhinitis, unspecified type     Past Surgical History:   Procedure Laterality Date     COLONOSCOPY  8/2010    4 mm polyps mid sigmoid and mid descending colon, repeat 5 years     DEXA  10/2010    T score lumbar 0.8, femoral neck -0.8/-0.2 with BMD 0.975     HC EXC MALIG SKIN LESION TRUNK/ARM/LEG <=0.5 CM  4/2005    excision basal cell right forearm     HC EXCISION BREAST LESION W XRAY MARKER, OPEN SINGLE   2010    Right breast excisional biopsy: Focal complex sclerosing lesion, focal columnar cell hyperplasia, adenosis with microcalcifications, no atypia     HC MRI CERVICAL SPINE W/O CONTRAST  10/2009    mulitilevel disc and facet joint denerative changes, mild spinal stenosis lower cervical levels, varying degrees of foraminal stenosis rt>lt (especially C4-5)     HC NCS MOTOR W/O F-WAVE, EACH NERVE  2009    mild left and very mild right median nerve neuropathy     HC REPAIR INTERMED, WOUND TRUNK/ARM/LEG 7.6-12 CM  1970s    complex closure right knee wound     MRA ANGIOGRAM BRAIN & MRI BRAIN W/O CONTRAST  2013    normal     SONO PELVIS COMPLETE  2013    2 small unterine myoma (12 and 14 mm) NOT approximating endometrium, 4  mm endoemtrial thickness       Social History   Substance Use Topics     Smoking status: Former Smoker     Packs/day: 1.50     Years: 11.00     Types: Cigarettes     Quit date: 1988     Smokeless tobacco: Never Used     Alcohol use 0.0 oz/week     0 Standard drinks or equivalent per week      Comment: 0-3 beers a week     Family History   Problem Relation Age of Onset     Lipids Mother      CANCER Mother      several basal cell skin cancers     HEART DISEASE Mother      atrial fibrillation/pacemaker, rheumatic heart disease - valve replacement     GASTROINTESTINAL DISEASE Mother      GI bleeding from gastritis/diverticulitis     Alcohol/Drug Father      Respiratory Father      COPD,  66, smoker     Alzheimer Disease Maternal Grandmother      onset early 80s     Cancer - colorectal Maternal Grandfather       age 80     Breast Cancer Maternal Aunt      onset early 60s     Blood Disease Sister      thrombophlebitis, negative factor testing     Connective Tissue Disorder Brother      bilateral hip replacement 40s for slipped capital epiphyses     Neurologic Disorder Other      nieces/nephews with Tourette's     Osteoarthritis Sister      hands         Current Outpatient  "Prescriptions   Medication Sig Dispense Refill     buPROPion (WELLBUTRIN XL) 300 MG 24 hr tablet Take 1 tab daily 90 tablet 1     venlafaxine (EFFEXOR-XR) 37.5 MG 24 hr capsule Take 1 capsule daily 90 capsule 1     cloNIDine (CATAPRES) 0.1 MG tablet Take 1 tablet (0.1 mg) by mouth 2 times daily 180 tablet 3     Nutritional Supplements (ESTROVEN PO)        simvastatin (ZOCOR) 10 MG tablet Take 1 tablet (10 mg) by mouth At Bedtime 90 tablet 3     levothyroxine (SYNTHROID/LEVOTHROID) 100 MCG tablet Take 1 tablet (100 mcg) by mouth daily 90 tablet 3     fluticasone (FLONASE) 50 MCG/ACT spray Spray 2 sprays into both nostrils daily 16 g 11     ketotifen (ZADITOR) 0.025 % SOLN Place 1 drop into both eyes every 12 hours 1 Bottle 3     Calcium-Vitamin D-Vitamin K 500-100-40 MG-UNT-MCG CHEW  90 tablet      Multiple Vitamin (DAILY MULTIVITAMIN PO) Take 1 tablet by mouth daily       fexofenadine (ALLEGRA) 180 MG tablet Take 1 tablet by mouth daily. 90 tablet 3     Allergies   Allergen Reactions     Cats      Ragweeds          Reviewed and updated as needed this visit by clinical staffTobacco  Allergies  Meds       Reviewed and updated as needed this visit by Provider         ROS:  C: NEGATIVE for fever, chills, change in weight  E/M: NEGATIVE for ear, mouth and throat problems  R: NEGATIVE for significant cough or SOB  CV: NEGATIVE for chest pain, palpitations or peripheral edema    OBJECTIVE:                                                    /78  Pulse 72  Temp 97.2  F (36.2  C) (Tympanic)  Ht 5' 2\" (1.575 m)  Wt 148 lb (67.1 kg)  LMP 08/16/2010  SpO2 97%  BMI 27.07 kg/m2  Body mass index is 27.07 kg/(m^2).   GENERAL: healthy, alert, well nourished, well hydrated, no distress  RESP: lungs clear to auscultation - no rales, no rhonchi, no wheezes  CV: regular rates and rhythm, normal S1 S2, no S3 or S4 and no murmur, no click or rub -  PSYCH: Alert and oriented times 3; speech- coherent , normal rate and " volume; able to articulate logical thoughts, able to abstract reason, no tangential thoughts, no hallucinations or delusions, affect- normal         ASSESSMENT/PLAN:                                                        ICD-10-CM    1. Moderate Depression [296.22] F32.1 buPROPion (WELLBUTRIN XL) 300 MG 24 hr tablet     venlafaxine (EFFEXOR-XR) 37.5 MG 24 hr capsule   2. Anxiety F41.9 venlafaxine (EFFEXOR-XR) 37.5 MG 24 hr capsule   3. Hot flash, menopausal N95.1 venlafaxine (EFFEXOR-XR) 37.5 MG 24 hr capsule     cloNIDine (CATAPRES) 0.1 MG tablet     Symptoms of anxiety and depression are well managed.  Hot flashes are well managed.  Recommended to resume Wellbutrin, Effexor and clonidine at the current doses.  Refills on the medications ordered.  Discussed the importance of using clonidine twice a day regularly, side effect profile including rebound hypertension if the dose is missed was discussed with the patient.  Patient does have decreased libido which is a side effect of Effexor.  She is aware that and does not want to stop the medication at this time.  If symptoms are well controlled by the next visit in 5-6 months, we may try to lower the dose of Wellbutrin  Patient verbalizes understanding and agreement  Follow-up in 5 months for recheck  Follow up with Provider - as needed     Donna Cheatham MD  [facility]

## 2018-01-10 ASSESSMENT — ANXIETY QUESTIONNAIRES: GAD7 TOTAL SCORE: 0

## 2018-03-22 ENCOUNTER — TELEPHONE (OUTPATIENT)
Dept: FAMILY MEDICINE | Facility: CLINIC | Age: 58
End: 2018-03-22

## 2018-03-22 NOTE — TELEPHONE ENCOUNTER
"Patient does not report pain but discomfort in her pelvic area and a \"weird\" sensation.  Uncomfortable- not painful. Some Burning.  X approx 2 weeks.  Her boyfriend made her call.  She doesn't know if she needs to be seen.  Mild Increased urinary frequency- no burning or blood in  Urine or back pain.    Not having much intercourse in last month.  No pain.  No vaginal discharge.  No fever.   No menses. She is menopausal.      She would like to come in next Tuesday to see Dr Cheatham.  Appt made  If signs and symptoms increase please call triage back to reeval.  If signs or UTI arise call oncare or go to   Christina Chaidez RN- Triage [employer]               "

## 2018-03-27 ENCOUNTER — OFFICE VISIT (OUTPATIENT)
Dept: FAMILY MEDICINE | Facility: CLINIC | Age: 58
End: 2018-03-27
Payer: COMMERCIAL

## 2018-03-27 VITALS
TEMPERATURE: 98.2 F | HEIGHT: 62 IN | HEART RATE: 71 BPM | SYSTOLIC BLOOD PRESSURE: 112 MMHG | OXYGEN SATURATION: 99 % | BODY MASS INDEX: 27.6 KG/M2 | RESPIRATION RATE: 12 BRPM | WEIGHT: 150 LBS | DIASTOLIC BLOOD PRESSURE: 74 MMHG

## 2018-03-27 DIAGNOSIS — F32.1 MAJOR DEPRESSIVE DISORDER, SINGLE EPISODE, MODERATE (H): Primary | ICD-10-CM

## 2018-03-27 DIAGNOSIS — F41.9 ANXIETY: ICD-10-CM

## 2018-03-27 PROCEDURE — 99214 OFFICE O/P EST MOD 30 MIN: CPT | Performed by: FAMILY MEDICINE

## 2018-03-27 RX ORDER — VENLAFAXINE HYDROCHLORIDE 75 MG/1
75 CAPSULE, EXTENDED RELEASE ORAL DAILY
Qty: 90 CAPSULE | Refills: 1 | Status: SHIPPED | OUTPATIENT
Start: 2018-03-27 | End: 2018-10-17

## 2018-03-27 RX ORDER — VENLAFAXINE HYDROCHLORIDE 75 MG/1
75 CAPSULE, EXTENDED RELEASE ORAL DAILY
Qty: 90 CAPSULE | Refills: 1 | Status: SHIPPED | OUTPATIENT
Start: 2018-03-27 | End: 2018-03-27

## 2018-03-27 RX ORDER — VENLAFAXINE HYDROCHLORIDE 75 MG/1
75 CAPSULE, EXTENDED RELEASE ORAL DAILY
Qty: 30 CAPSULE | Refills: 0 | Status: SHIPPED | OUTPATIENT
Start: 2018-03-27 | End: 2019-05-17

## 2018-03-27 ASSESSMENT — PATIENT HEALTH QUESTIONNAIRE - PHQ9: 5. POOR APPETITE OR OVEREATING: NOT AT ALL

## 2018-03-27 ASSESSMENT — ANXIETY QUESTIONNAIRES
1. FEELING NERVOUS, ANXIOUS, OR ON EDGE: SEVERAL DAYS
6. BECOMING EASILY ANNOYED OR IRRITABLE: SEVERAL DAYS
IF YOU CHECKED OFF ANY PROBLEMS ON THIS QUESTIONNAIRE, HOW DIFFICULT HAVE THESE PROBLEMS MADE IT FOR YOU TO DO YOUR WORK, TAKE CARE OF THINGS AT HOME, OR GET ALONG WITH OTHER PEOPLE: SOMEWHAT DIFFICULT
2. NOT BEING ABLE TO STOP OR CONTROL WORRYING: SEVERAL DAYS
7. FEELING AFRAID AS IF SOMETHING AWFUL MIGHT HAPPEN: NOT AT ALL
5. BEING SO RESTLESS THAT IT IS HARD TO SIT STILL: NOT AT ALL
GAD7 TOTAL SCORE: 4
3. WORRYING TOO MUCH ABOUT DIFFERENT THINGS: SEVERAL DAYS

## 2018-03-27 NOTE — PROGRESS NOTES
SUBJECTIVE:   Allie Thao is a 58 year old female who presents to clinic today for the following health issues:      Mood Changes         Description (location/character/radiation): Increased stress at work, trouble sleeping     Tells that a few guys at work have been leaving her out of important discussions and plans.  Things are not communicated well to her which is causing a lot of issues for her.  She has brought it to her managers attention without any improvement of the situation.    Intensity:  moderate    Accompanying signs and symptoms: none     History (similar episodes/previous evaluation): None    Precipitating or alleviating factors: None    Therapies tried and outcome: currently taking Wellbutrin 300 mg XL daily and Effexor 37.5 mg daily.    Reports of a low sex drive.    She has been taking clonidine regularly which is helped with the hot flashes.    Tells that she argues and fights with her partner all the time.    She would like to increase the dose of Effexor to 75 mg daily         PHQ-9 SCORE 11/13/2017 12/5/2017 1/9/2018   Total Score - - -   Total Score MyChart - - -   Total Score 3 4 2     REGINA-7 SCORE 11/13/2017 12/5/2017 1/9/2018   Total Score - - -   Total Score 4 7 0             Problem list and histories reviewed & adjusted, as indicated.  Additional history: as documented    Patient Active Problem List   Diagnosis     Osteoarthritis cervical spine     Allergic rhinitis     History of basal cell carcinoma     Idiopathic urticaria     Hashimoto's thyroiditis     HYPERLIPIDEMIA LDL GOAL <160     Moderate Depression [296.22]     Flat epithelial atypia of breast     Advanced directives, counseling/discussion     Acquired hypothyroidism     Chronic rhinitis, unspecified type     Past Surgical History:   Procedure Laterality Date     COLONOSCOPY  8/2010    4 mm polyps mid sigmoid and mid descending colon, repeat 5 years     DEXA  10/2010    T score lumbar 0.8, femoral neck -0.8/-0.2 with  BMD 0.975     HC EXC MALIG SKIN LESION TRUNK/ARM/LEG <=0.5 CM  2005    excision basal cell right forearm     HC EXCISION BREAST LESION W XRAY MARKER, OPEN SINGLE  2010    Right breast excisional biopsy: Focal complex sclerosing lesion, focal columnar cell hyperplasia, adenosis with microcalcifications, no atypia     HC MRI CERVICAL SPINE W/O CONTRAST  10/2009    mulitilevel disc and facet joint denerative changes, mild spinal stenosis lower cervical levels, varying degrees of foraminal stenosis rt>lt (especially C4-5)     HC NCS MOTOR W/O F-WAVE, EACH NERVE  2009    mild left and very mild right median nerve neuropathy     HC REPAIR INTERMED, WOUND TRUNK/ARM/LEG 7.6-12 CM  1970s    complex closure right knee wound     MRA ANGIOGRAM BRAIN & MRI BRAIN W/O CONTRAST  2013    normal     SONO PELVIS COMPLETE  2013    2 small unterine myoma (12 and 14 mm) NOT approximating endometrium, 4  mm endoemtrial thickness       Social History   Substance Use Topics     Smoking status: Former Smoker     Packs/day: 1.50     Years: 11.00     Types: Cigarettes     Quit date: 1988     Smokeless tobacco: Never Used     Alcohol use 0.0 oz/week     0 Standard drinks or equivalent per week      Comment: 0-3 beers a week     Family History   Problem Relation Age of Onset     Lipids Mother      CANCER Mother      several basal cell skin cancers     HEART DISEASE Mother      atrial fibrillation/pacemaker, rheumatic heart disease - valve replacement     GASTROINTESTINAL DISEASE Mother      GI bleeding from gastritis/diverticulitis     Alcohol/Drug Father      Respiratory Father      COPD,  66, smoker     Alzheimer Disease Maternal Grandmother      onset early 80s     Cancer - colorectal Maternal Grandfather       age 80     Breast Cancer Maternal Aunt      onset early 60s     Blood Disease Sister      thrombophlebitis, negative factor testing     Connective Tissue Disorder Brother      bilateral hip replacement 40s for  slipped capital epiphyses     Neurologic Disorder Other      nieces/nephews with Tourette's     Osteoarthritis Sister      hands         Current Outpatient Prescriptions   Medication Sig Dispense Refill     venlafaxine (EFFEXOR-XR) 75 MG 24 hr capsule Take 1 capsule (75 mg) by mouth daily Take 1 capsule daily 90 capsule 1     venlafaxine (EFFEXOR-XR) 75 MG 24 hr capsule Take 1 capsule (75 mg) by mouth daily Take 1 capsule daily 30 capsule 0     buPROPion (WELLBUTRIN XL) 300 MG 24 hr tablet Take 1 tab daily 90 tablet 1     cloNIDine (CATAPRES) 0.1 MG tablet Take 1 tablet (0.1 mg) by mouth 2 times daily 180 tablet 3     simvastatin (ZOCOR) 10 MG tablet Take 1 tablet (10 mg) by mouth At Bedtime 90 tablet 3     levothyroxine (SYNTHROID/LEVOTHROID) 100 MCG tablet Take 1 tablet (100 mcg) by mouth daily 90 tablet 3     fluticasone (FLONASE) 50 MCG/ACT spray Spray 2 sprays into both nostrils daily 16 g 11     ketotifen (ZADITOR) 0.025 % SOLN Place 1 drop into both eyes every 12 hours 1 Bottle 3     Calcium-Vitamin D-Vitamin K 500-100-40 MG-UNT-MCG CHEW  90 tablet      Multiple Vitamin (DAILY MULTIVITAMIN PO) Take 1 tablet by mouth daily       fexofenadine (ALLEGRA) 180 MG tablet Take 1 tablet by mouth daily. 90 tablet 3     [DISCONTINUED] venlafaxine (EFFEXOR-XR) 75 MG 24 hr capsule Take 1 capsule (75 mg) by mouth daily Take 1 capsule daily 90 capsule 1     [DISCONTINUED] venlafaxine (EFFEXOR-XR) 37.5 MG 24 hr capsule Take 1 capsule daily 90 capsule 1     Allergies   Allergen Reactions     Cats      Ragweeds        Reviewed and updated as needed this visit by clinical staff       Reviewed and updated as needed this visit by Provider         ROS:  CONSTITUTIONAL: NEGATIVE for fever, chills, change in weight  ENT/MOUTH: NEGATIVE for ear, mouth and throat problems  RESP: NEGATIVE for significant cough or SOB  CV: NEGATIVE for chest pain, palpitations or peripheral edema    OBJECTIVE:                                              "       /74 (Cuff Size: Adult Regular)  Pulse 71  Temp 98.2  F (36.8  C) (Tympanic)  Resp 12  Ht 5' 2\" (1.575 m)  Wt 150 lb (68 kg)  LMP 08/18/2010  SpO2 99%  BMI 27.44 kg/m2  Body mass index is 27.44 kg/(m^2).   GENERAL: healthy, alert, well nourished, well hydrated, no distress  NECK: no tenderness, no adenopathy, no asymmetry, no masses, no stiffness; thyroid- normal to palpation  RESP: lungs clear to auscultation - no rales, no rhonchi, no wheezes  CV: regular rates and rhythm, normal S1 S2, no S3 or S4 and no murmur, no click or rub -  PSYCH: Alert and oriented times 3; speech- coherent , normal rate and volume; able to articulate logical thoughts, able to abstract reason, no tangential thoughts, no hallucinations or delusions, affect- normal         ASSESSMENT/PLAN:                                                      1. Moderate Depression [296.22]    - DEPRESSION ACTION PLAN (DAP)  - MENTAL HEALTH REFERRAL  - Adult; Outpatient Treatment; Individual/Couples/Family/Group Therapy/Health Psychology; [facility] [place]: [facility] [phone]; We will contact you to schedule the appointment or please call with any questions  - venlafaxine (EFFEXOR-XR) 75 MG 24 hr capsule; Take 1 capsule (75 mg) by mouth daily Take 1 capsule daily  Dispense: 90 capsule; Refill: 1      2. Anxiety    - venlafaxine (EFFEXOR-XR) 75 MG 24 hr capsule; Take 1 capsule (75 mg) by mouth daily Take 1 capsule daily  Dispense: 90 capsule; Refill: 1      Symptoms have worsened as compared to before.  Recommending to increase the dose of Effexor XR from 37.5 mg to 75 mg daily.  Resume Wellbutrin 300 mg daily.  Referring the patient for therapy.  Follow-up in the next 1 month for recheck.  If symptoms are still not improving, may consider psychiatry referral.    Total time spent was 25 minutes, more than half the time was spent in counseling the patient about the disease pathogenesis, treatment plan and coordinating " care.    Donna Cheatham MD  [facility] [place]

## 2018-03-27 NOTE — NURSING NOTE
"Chief Complaint   Patient presents with     MOOD CHANGES       Initial /74 (Cuff Size: Adult Regular)  Pulse 71  Temp 98.2  F (36.8  C) (Tympanic)  Resp 12  Ht 5' 2\" (1.575 m)  Wt 150 lb (68 kg)  LMP 08/18/2010  SpO2 99%  BMI 27.44 kg/m2 Estimated body mass index is 27.44 kg/(m^2) as calculated from the following:    Height as of this encounter: 5' 2\" (1.575 m).    Weight as of this encounter: 150 lb (68 kg).  Medication Reconciliation: complete   Marie Willams, CMA    "

## 2018-03-27 NOTE — MR AVS SNAPSHOT
After Visit Summary   3/27/2018    Allie Thao    MRN: [id number]           Patient Information     Date Of Birth          1960        Visit Information        Provider Department      3/27/2018 4:00 PM Donna Cheatham MD [facility] [place]        Today's Diagnoses     Moderate Depression [296.22]    -  1    Anxiety           Follow-ups after your visit        Additional Services     MENTAL HEALTH REFERRAL  - Adult; Outpatient Treatment; Individual/Couples/Family/Group Therapy/Health Psychology; G: [facility] [phone]; We will contact you to schedule the appointment or please call with any questions       All scheduling is subject to the client's specific insurance plan & benefits, provider/location availability, and provider clinical specialities.  Please arrive 15 minutes early for your first appointment and bring your completed paperwork.    Please be aware that coverage of these services is subject to the terms and limitations of your health insurance plan.  Call member services at your health plan with any benefit or coverage questions.                            Follow-up notes from your care team     Return in about 1 month (around 4/27/2018) for Mood Recheck.      Who to contact     If you have questions or need follow up information about today's clinic visit or your schedule please contact [facility] [place] directly at [phone].  Normal or non-critical lab and imaging results will be communicated to you by MyChart, letter or phone within 4 business days after the clinic has received the results. If you do not hear from us within 7 days, please contact the clinic through MyChart or phone. If you have a critical or abnormal lab result, we will notify you by phone as soon as possible.  Submit refill requests through "[employer] or call your pharmacy and they will forward the refill request to us. Please allow 3 business days for your refill  "to be completed.          Additional Information About Your Visit        [employer] Information     [employer] gives you secure access to your electronic health record. If you see a primary care provider, you can also send messages to your care team and make appointments. If you have questions, please call your primary care clinic.  If you do not have a primary care provider, please call [phone] and they will assist you.        Care EveryWhere ID     This is your Care EveryWhere ID. This could be used by other organizations to access your [facility] medical records  [id number]        Your Vitals Were     Pulse Temperature Respirations Height Last Period Pulse Oximetry    71 98.2  F (36.8  C) (Tympanic) 12 5' 2\" (1.575 m) 08/18/2010 99%    BMI (Body Mass Index)                   27.44 kg/m2            Blood Pressure from Last 3 Encounters:   03/27/18 112/74   01/09/18 108/78   12/05/17 109/73    Weight from Last 3 Encounters:   03/27/18 150 lb (68 kg)   01/09/18 148 lb (67.1 kg)   12/05/17 148 lb (67.1 kg)              We Performed the Following     DEPRESSION ACTION PLAN (DAP)     MENTAL HEALTH REFERRAL  - Adult; Outpatient Treatment; Individual/Couples/Family/Group Therapy/Health Psychology; [facility]: [facility] [phone]; We will contact you to schedule the appointment or please call with any questions          Today's Medication Changes          These changes are accurate as of 3/27/18  4:28 PM.  If you have any questions, ask your nurse or doctor.               These medicines have changed or have updated prescriptions.        Dose/Directions    venlafaxine 75 MG 24 hr capsule   Commonly known as:  EFFEXOR-XR   This may have changed:    - medication strength  - how much to take  - how to take this  - when to take this   Used for:  Anxiety, Major depressive disorder, single episode, moderate (H)   Changed by:  Donna Cheatham MD        Dose:  75 mg   Take 1 capsule (75 mg) by mouth daily Take 1 " capsule daily   Quantity:  90 capsule   Refills:  1            Where to get your medicines      These medications were sent to [facility] [place] - [address]  [address], [place] [address]     Phone:  [phone]     venlafaxine 75 MG 24 hr capsule                Primary Care Provider Office Phone # Fax #    Donna Cheatham [phone]       [address] [place] [address] [address]  [place] [address]        Equal Access to Services     [facility]: Hadii aad ku hadasho Soomaali, waaxda luqadaha, qaybta kaalmada adeegyada, waxay idiin hayaan adebrittany polo . So [facility] [phone].    ATENCIÓN: Si habla español, tiene a cross disposición servicios gratuitos de asistencia lingüística. [facility] [phone].    We comply with applicable federal civil rights laws and [place] laws. We do not discriminate on the basis of race, color, national origin, age, disability, sex, sexual orientation, or gender identity.            Thank you!     Thank you for choosing [facility] [place]  for your care. Our goal is always to provide you with excellent care. Hearing back from our patients is one way we can continue to improve our services. Please take a few minutes to complete the written survey that you may receive in the mail after your visit with us. Thank you!             Your Updated Medication List - Protect others around you: Learn how to safely use, store and throw away your medicines at www.disposemymeds.org.          This list is accurate as of 3/27/18  4:28 PM.  Always use your most recent med list.                   Brand Name Dispense Instructions for use Diagnosis    ALLEGRA 180 MG tablet   Generic drug:  fexofenadine     90 tablet    Take 1 tablet by mouth daily.        buPROPion 300 MG 24 hr tablet    WELLBUTRIN XL    90 tablet    Take 1 tab daily    Major depressive disorder, single episode, moderate (H)       Calcium-Vitamin D-Vitamin  K 500-100-40 MG-UNT-MCG Chew     90 tablet         cloNIDine 0.1 MG tablet    CATAPRES    180 tablet    Take 1 tablet (0.1 mg) by mouth 2 times daily    Hot flash, menopausal       DAILY MULTIVITAMIN PO      Take 1 tablet by mouth daily        fluticasone 50 MCG/ACT spray    FLONASE    16 g    Spray 2 sprays into both nostrils daily    Chronic rhinitis, unspecified type       ketotifen 0.025 % Soln ophthalmic solution    ZADITOR    1 Bottle    Place 1 drop into both eyes every 12 hours    Chronic allergic conjunctivitis       levothyroxine 100 MCG tablet    SYNTHROID/LEVOTHROID    90 tablet    Take 1 tablet (100 mcg) by mouth daily    Acquired hypothyroidism       simvastatin 10 MG tablet    ZOCOR    90 tablet    Take 1 tablet (10 mg) by mouth At Bedtime    Hyperlipidemia LDL goal <160       venlafaxine 75 MG 24 hr capsule    EFFEXOR-XR    90 capsule    Take 1 capsule (75 mg) by mouth daily Take 1 capsule daily    Anxiety, Major depressive disorder, single episode, moderate (H)

## 2018-03-28 ASSESSMENT — PATIENT HEALTH QUESTIONNAIRE - PHQ9: SUM OF ALL RESPONSES TO PHQ QUESTIONS 1-9: 7

## 2018-03-28 ASSESSMENT — ANXIETY QUESTIONNAIRES: GAD7 TOTAL SCORE: 4

## 2018-05-13 ASSESSMENT — ANXIETY QUESTIONNAIRES
7. FEELING AFRAID AS IF SOMETHING AWFUL MIGHT HAPPEN: SEVERAL DAYS
6. BECOMING EASILY ANNOYED OR IRRITABLE: SEVERAL DAYS
2. NOT BEING ABLE TO STOP OR CONTROL WORRYING: SEVERAL DAYS
GAD7 TOTAL SCORE: 6
3. WORRYING TOO MUCH ABOUT DIFFERENT THINGS: SEVERAL DAYS
1. FEELING NERVOUS, ANXIOUS, OR ON EDGE: SEVERAL DAYS
7. FEELING AFRAID AS IF SOMETHING AWFUL MIGHT HAPPEN: SEVERAL DAYS
GAD7 TOTAL SCORE: 6
4. TROUBLE RELAXING: SEVERAL DAYS
5. BEING SO RESTLESS THAT IT IS HARD TO SIT STILL: NOT AT ALL
GAD7 TOTAL SCORE: 6

## 2018-05-13 ASSESSMENT — PATIENT HEALTH QUESTIONNAIRE - PHQ9
10. IF YOU CHECKED OFF ANY PROBLEMS, HOW DIFFICULT HAVE THESE PROBLEMS MADE IT FOR YOU TO DO YOUR WORK, TAKE CARE OF THINGS AT HOME, OR GET ALONG WITH OTHER PEOPLE: SOMEWHAT DIFFICULT
SUM OF ALL RESPONSES TO PHQ QUESTIONS 1-9: 5
SUM OF ALL RESPONSES TO PHQ QUESTIONS 1-9: 5

## 2018-05-14 ASSESSMENT — PATIENT HEALTH QUESTIONNAIRE - PHQ9: SUM OF ALL RESPONSES TO PHQ QUESTIONS 1-9: 5

## 2018-05-14 ASSESSMENT — ANXIETY QUESTIONNAIRES: GAD7 TOTAL SCORE: 6

## 2018-05-15 ENCOUNTER — OFFICE VISIT (OUTPATIENT)
Dept: PSYCHOLOGY | Facility: CLINIC | Age: 58
End: 2018-05-15
Attending: FAMILY MEDICINE
Payer: COMMERCIAL

## 2018-05-15 DIAGNOSIS — F41.8 MIXED ANXIETY AND DEPRESSIVE DISORDER: Primary | ICD-10-CM

## 2018-05-15 PROCEDURE — 90791 PSYCH DIAGNOSTIC EVALUATION: CPT | Performed by: PSYCHOLOGIST

## 2018-05-15 NOTE — Clinical Note
Donna, I enjoyed meeting Aylin today. I think she will be a great therapy candidate. I will keep you posted with any relevant information. Thanks for sending her our way. Leila

## 2018-05-15 NOTE — MR AVS SNAPSHOT
MRN:[id number]                      After Visit Summary   5/15/2018    Allie Thao    MRN: [id number]           Visit Information        Provider Department      5/15/2018 12:00 PM Leila Mendenhall LP [facility]      Your next 10 appointments already scheduled     May 22, 2018  1:00 PM CDT   Return Visit with Leila Mendenhall LP   [facility] [place] ([facility])    [address] [address] [phone]              MyChart Information     [email or web address] gives you secure access to your electronic health record. If you see a primary care provider, you can also send messages to your care team and make appointments. If you have questions, please call your primary care clinic.  If you do not have a primary care provider, please call [phone] and they will assist you.        Care EveryWhere ID     This is your Care EveryWhere ID. This could be used by other organizations to access your [facility] medical records  [id number]        Equal Access to Services     [facility] : Hadii brent wills Solesly, waaxda luqadaha, qaybta kaalmada aderigoberto, renetta lozoya. So [facility] [phone].    ATENCIÓN: Si habla español, tiene a cross disposición servicios gratuitos de asistencia lingüística. Llame al [phone].    We comply with applicable federal civil rights laws and [place] laws. We do not discriminate on the basis of race, color, national origin, age, disability, sex, sexual orientation, or gender identity.
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
## 2018-05-22 ENCOUNTER — OFFICE VISIT (OUTPATIENT)
Dept: PSYCHOLOGY | Facility: CLINIC | Age: 58
End: 2018-05-22
Attending: FAMILY MEDICINE
Payer: COMMERCIAL

## 2018-05-22 DIAGNOSIS — F41.8 MIXED ANXIETY AND DEPRESSIVE DISORDER: Primary | ICD-10-CM

## 2018-05-22 PROCEDURE — 90834 PSYTX W PT 45 MINUTES: CPT | Performed by: PSYCHOLOGIST

## 2018-05-22 NOTE — MR AVS SNAPSHOT
MRN:[id number]                      After Visit Summary   5/22/2018    Allie Thao    MRN: [id number]           Visit Information        Provider Department      5/22/2018 1:00 PM Leila Mendehnall LP [facility]      Your next 10 appointments already scheduled     Jun 06, 2018  8:30 AM CDT   Return Visit with Leila Mendenhall LP   [facility] [place] ([facility])    [address] [address] [phone]              MyChart Information     [employer] gives you secure access to your electronic health record. If you see a primary care provider, you can also send messages to your care team and make appointments. If you have questions, please call your primary care clinic.  If you do not have a primary care provider, please call [phone] and they will assist you.        Care EveryWhere ID     This is your Care EveryWhere ID. This could be used by other organizations to access your [facility] medical records  [id number]        Equal Access to Services     PRASANTH [facility] HospitalLESLIE : Hadii brent wills Solesly, waaxda luqadaha, qaybta kaalmada aderigoberto, renetta polo . So [facility] [phone].    ATENCIÓN: Si habla español, tiene a cross disposición servicios gratuitos de asistencia lingüística. Llame al [phone].    We comply with applicable federal civil rights laws and [place] laws. We do not discriminate on the basis of race, color, national origin, age, disability, sex, sexual orientation, or gender identity.

## 2018-05-22 NOTE — PROGRESS NOTES
WHODAS 2.0  World Health Organization  Disability Assessment Schedule 2.0              12-Item version, self-administered             This questionnaire asks about difficulties due to health conditions. Health conditions include disease or  illnesses, other health problems that may be short or long lasting, injuries, mental health or emotional problems, and problems with alcohol or drugs.                     Think back over the past 30 days and answer these questions, thinking about how much difficulty you had doing the following activities. For each question, please [place] only one response.    S1 Standing for long periods such as 30 minutes? None =         1   S2 Taking care of household responsibilities? Mild =           2   S3 Learning a new task, for example, learning how to get to a new place? Mild =           2   S4 How much of a problem do you have joining community activities (for example, festivals, Scientology or other activities) in the same way as anyone else can? Mild =           2   S5 How much have you been emotionally affected by your health problems? Moderate =   3   In the past 30 days, how much difficulty did you have in:   S6 Concentrating on doing something for ten minutes? Moderate =   3   S7 Walking a long distance such as a kilometer (or equivalent)? None =         1   S8 Washing your whole body? None =         1   S9 Getting dressed? None =         1   S10 Dealing with people you do not know? None =         1   S11 Maintaining a friendship? Mild =           2   S12 Your day to day work? Moderate =   3   Simple Scoring Total: 22     H1 Overall, in the past 30 days, how many days were these difficulties present? Record number of days 15   H2 In the past 30 days, for how many days were you totally unable to carry out your usual activities or work because of any health condition? Record number of days  0   H3 In the past 30 days, not counting the days that you were totally unable, for how many  days did you cut back or reduce your usual activities or work because of any health condition? Record number of days 0

## 2018-05-22 NOTE — PROGRESS NOTES
Progress Note    Client Name: Allie Thao  Date: 5/22/18         Service Type: Individual      Session Start Time: 13:00  Session End Time: 13:50      Session Length: 45-50     Session #: 2     Attendees: Client attended alone    Treatment Plan Last Reviewed: 5/22/18  PHQ-9 / REGINA-7 Last Reviewed : 5/15/18     DATA      Progress Since Last Session (Related to Symptoms / Goals / Homework):   Symptoms: No change    Homework: N/A--first session      Episode of Care Goals: New objectives established today     Current / Ongoing Stressors and Concerns:   Work stressors   Interpersonal stressors     Treatment Objective(s) Addressed in This Session:   Complete treatment plan   identify >2 strategies to more effectively address stressors at work       Intervention:   Discussed and agreed upon goals for treatment. Client stated that stressors in the workplace are her most pressing concern currently. She provided context and history around her job and some coworkers, including the points of most tension at the present. Introduced CBT as a framework for achieving greater peace, acceptance, and emotional comfort even in the face of challenging external circumstances. Agreed we will begin with CBT concepts next session.        ASSESSMENT: Current Emotional / Mental Status (status of significant symptoms):   Risk status (Self / Other harm or suicidal ideation)   Client denies current fears or concerns for personal safety.   Client denies current or recent suicidal ideation or behaviors.   Client denies current or recent homicidal ideation or behaviors.   Client denies current or recent self injurious behavior or ideation.   Client denies other safety concerns.   A safety and risk management plan has not been developed at this time, however client was given the after-hours number / 911 should there be a change in any of these risk factors.     Appearance:   Appropriate    Eye  Contact:   Good    Psychomotor Behavior: Normal    Attitude:   Cooperative    Orientation:   All   Speech    Rate / Production: Normal     Volume:  Normal    Mood:    Anxious    Affect:    Appropriate    Thought Content:  Frequent negative thought patterns    Thought Form:  Coherent  Logical    Insight:    Fair      Medication Review:   No changes to current psychiatric medication(s)     Medication Compliance:   Yes     Changes in Health Issues:   None reported     Chemical Use Review:   Substance Use: Chemical use reviewed, monitoring alcohol use     Tobacco Use: No current tobacco use.       Collateral Reports Completed:   WHODAS completed                WHODAS            12-Item version, self-administered             This questionnaire asks about difficulties due to health conditions. Health conditions include disease or illnesses, other health problems that may be short or long lasting, injuries, mental health or emotional problems, and problems with alcohol or drugs.                     Think back over the past 30 days and answer these questions, thinking about how much difficulty you had doing the following activities. For each question, please Native only one response.    S1 Standing for long periods such as 30 minutes? None =         1   S2 Taking care of household responsibilities? Mild =           2   S3 Learning a new task, for example, learning how to get to a new place? Mild =           2   S4 How much of a problem do you have joining community activities (for example, festivals, [employer] or other activities) in the same way as anyone else can? Mild =           2   S5 How much have you been emotionally affected by your health problems? Moderate =   3   In the past 30 days, how much difficulty did you have in:   S6 Concentrating on doing something for ten minutes? Moderate =   3   S7 Walking a long distance such as a kilometer (or equivalent)? None =         1   S8 Washing your whole body? None =          1   S9 Getting dressed? None =         1   S10 Dealing with people you do not know? None =         1   S11 Maintaining a friendship? Mild =           2   S12 Your day to day work? Moderate =   3   Simple Scoring Total: 22     H1 Overall, in the past 30 days, how many days were these difficulties present? Record number of days 15   H2 In the past 30 days, for how many days were you totally unable to carry out your usual activities or work because of any health condition? Record number of days  0   H3 In the past 30 days, not counting the days that you were totally unable, for how many days did you cut back or reduce your usual activities or work because of any health condition? Record number of days 0       PLAN: (Client Tasks / Therapist Tasks / Other)  Plan to discuss CBT concepts in detail next session. Client will call to schedule return appointments.      Leila Mendenhall, [facility]                                                      ________________________________________________________________________    Treatment Plan    Client's Name: Allie Thao  YOB: 1960    Date: 5/22/18    DSM-V Diagnoses: 300.09 (F41.8) Other Specified Anxiety Disorder , Mixed Anxiety and Depressive Disorder  Psychosocial / Contextual Factors: work stressors, interpersonal stressors  WHODAS: 22    Referral / Collaboration:  Referral to another professional/service is not indicated at this time.    Anticipated number of session or this episode of care: re-evaluate every 90 days      MeasurableTreatment Goal(s) related to diagnosis / functional impairment(s)  Goal 1: Client will identify >2 effective strategies for managing stressors at work.    I will know I've met my goal when I know how to handle situations where I feel like I'm being discriminated against because of my age and gender.      Objective #A (Client Action)    Client will use cognitive strategies identified in therapy to challenge anxious/unhelpful  thoughts.  Status: New - Date: 5/22/18     Intervention(s)  Therapist will teach CBT strategies.    Objective #B  Client will use at least 2 coping skills for anxiety management in the next 8 weeks.  Status: New - Date: 5/22/18     Intervention(s)  Therapist will teach self-regulation, mindfulness, CBT skills.    Goal 2: Client will improve relationship with fiance.    I will know I've met my goal when I'm more comfortable with physical intimacy.      Objective #A (Client Action)    Status: New - Date: 5/22/18     Client will identify barriers to positive body image.    Intervention(s)  Therapist will provide psychoeducation, support.    Objective #B  Client will learn and use >2 effective interpersonal communication strategies.    Status: New - Date: 5/22/18     Intervention(s)  Therapist will teach assertive communication, speaker/listener technique.      Client has reviewed and agreed to the above plan.      Leila Mendenhall, [facility]  May 22, 2018

## 2018-06-06 ENCOUNTER — OFFICE VISIT (OUTPATIENT)
Dept: PSYCHOLOGY | Facility: CLINIC | Age: 58
End: 2018-06-06
Payer: COMMERCIAL

## 2018-06-06 DIAGNOSIS — F41.8 MIXED ANXIETY AND DEPRESSIVE DISORDER: Primary | ICD-10-CM

## 2018-06-06 PROCEDURE — 90834 PSYTX W PT 45 MINUTES: CPT | Performed by: PSYCHOLOGIST

## 2018-06-06 NOTE — PROGRESS NOTES
Progress Note    Client Name: Allie Thao  Date: 6/6/18         Service Type: Individual      Session Start Time: 08:30  Session End Time: 09:20      Session Length: 45-50     Session #: 3     Attendees: Client attended alone    Treatment Plan Last Reviewed: 5/22/18  PHQ-9 / REGINA-7 Last Reviewed : 5/15/18     DATA      Progress Since Last Session (Related to Symptoms / Goals / Homework):   Symptoms: No change    Homework: N/A      Episode of Care Goals: Initial stages     Current / Ongoing Stressors and Concerns:   Work stressors   Interpersonal stressors     Treatment Objective(s) Addressed in This Session:   Complete treatment plan   identify >2 strategies to more effectively address stressors at work       Intervention:   Today's session focused on CBT concepts, particularly the central importance of thoughts on emotions and actions. Emphasized that more important than any external situation or circumstance is the interpretation that one makes about that situation. Talked about the impact of modifying interpretations or patterns of thoughts that have become distorted. I encouraged Client to begin increasing awareness of her internal monologue. Practiced applying these concepts using current situations in Client's work and personal life.        ASSESSMENT: Current Emotional / Mental Status (status of significant symptoms):   Risk status (Self / Other harm or suicidal ideation)   Client denies current fears or concerns for personal safety.   Client denies current or recent suicidal ideation or behaviors.   Client denies current or recent homicidal ideation or behaviors.   Client denies current or recent self injurious behavior or ideation.   Client denies other safety concerns.   A safety and risk management plan has not been developed at this time, however client was given the after-hours number / 911 should there be a change in any of these risk  factors.     Appearance:   Appropriate    Eye Contact:   Good    Psychomotor Behavior: Normal    Attitude:   Cooperative    Orientation:   All   Speech    Rate / Production: Normal     Volume:  Normal    Mood:    Anxious    Affect:    Appropriate    Thought Content:  Frequent negative thought patterns    Thought Form:  Coherent  Logical    Insight:    Fair      Medication Review:   No changes to current psychiatric medication(s)     Medication Compliance:   Yes     Changes in Health Issues:   None reported     Chemical Use Review:   Substance Use: Chemical use reviewed, monitoring alcohol use     Tobacco Use: No current tobacco use.       Collateral Reports Completed:   Not Applicable                  PLAN: (Client Tasks / Therapist Tasks / Other)  Handout on common thought distortions was given for Client's review. Will discuss next session. She will work on increasing awareness of her internal monologue. Client will call to schedule return appointments.      Leila Mendenhall, [place]                                                      ________________________________________________________________________    Treatment Plan    Client's Name: Allie Thao  YOB: 1960    Date: 5/22/18    DSM-V Diagnoses: 300.09 (F41.8) Other Specified Anxiety Disorder , Mixed Anxiety and Depressive Disorder  Psychosocial / Contextual Factors: work stressors, interpersonal stressors  WHODAS: 22    Referral / Collaboration:  Referral to another professional/service is not indicated at this time.    Anticipated number of session or this episode of care: re-evaluate every 90 days      MeasurableTreatment Goal(s) related to diagnosis / functional impairment(s)  Goal 1: Client will identify >2 effective strategies for managing stressors at work.    I will know I've met my goal when I know how to handle situations where I feel like I'm being discriminated against because of my age and gender.      Objective #A (Client  Action)    Client will use cognitive strategies identified in therapy to challenge anxious/unhelpful thoughts.  Status: New - Date: 5/22/18     Intervention(s)  Therapist will teach CBT strategies.    Objective #B  Client will use at least 2 coping skills for anxiety management in the next 8 weeks.  Status: New - Date: 5/22/18     Intervention(s)  Therapist will teach self-regulation, mindfulness, CBT skills.    Goal 2: Client will improve relationship with fiance.    I will know I've met my goal when I'm more comfortable with physical intimacy.      Objective #A (Client Action)    Status: New - Date: 5/22/18     Client will identify barriers to positive body image.    Intervention(s)  Therapist will provide psychoeducation, support.    Objective #B  Client will learn and use >2 effective interpersonal communication strategies.    Status: New - Date: 5/22/18     Intervention(s)  Therapist will teach assertive communication, speaker/listener technique.      Client has reviewed and agreed to the above plan.      Leila Mendenhall, [place]  May 22, 2018

## 2018-06-06 NOTE — MR AVS SNAPSHOT
MRN:[id number]                      After Visit Summary   6/6/2018    Allie Thao    MRN: [id number]           Visit Information        Provider Department      6/6/2018 8:30 AM Leila Mendenhall LP [facility] Generic      MyChart Information     MyChart gives you secure access to your electronic health record. If you see a primary care provider, you can also send messages to your care team and make appointments. If you have questions, please call your primary care clinic.  If you do not have a primary care provider, please call [phone] and they will assist you.        Care EveryWhere ID     This is your Care EveryWhere ID. This could be used by other organizations to access your [place] medical records  [id number]        Equal Access to Services     GEORGIANA AVENDANO : Simón Whiting, waaxda luqadaha, qaybta kaalmacoby tucker, renetta lozoya. So [facility] [phone].    ATENCIÓN: Si habla español, tiene a cross disposición servicios gratuitos de asistencia lingüística. Llame al [phone].    We comply with applicable federal civil rights laws and [place] laws. We do not discriminate on the basis of race, color, national origin, age, disability, sex, sexual orientation, or gender identity.

## 2018-07-09 ENCOUNTER — OFFICE VISIT (OUTPATIENT)
Dept: PSYCHOLOGY | Facility: CLINIC | Age: 58
End: 2018-07-09
Payer: COMMERCIAL

## 2018-07-09 DIAGNOSIS — F41.8 MIXED ANXIETY AND DEPRESSIVE DISORDER: Primary | ICD-10-CM

## 2018-07-09 PROCEDURE — 90834 PSYTX W PT 45 MINUTES: CPT | Performed by: PSYCHOLOGIST

## 2018-07-09 NOTE — PROGRESS NOTES
Progress Note    Client Name: Allie Thao  Date: 7/9/18         Service Type: Individual      Session Start Time: 13:00  Session End Time: 13:50      Session Length: 45-50     Session #: 4     Attendees: Client attended alone    Treatment Plan Last Reviewed: 5/22/18  PHQ-9 / REGINA-7 Last Reviewed : 5/15/18     DATA      Progress Since Last Session (Related to Symptoms / Goals / Homework):   Symptoms: Variable, some improvements noted at work    Homework: Completed      Episode of Care Goals: Initial stages     Current / Ongoing Stressors and Concerns:   Work stressors   Interpersonal stressors     Treatment Objective(s) Addressed in This Session:   Complete treatment plan   identify >2 strategies to more effectively address stressors at work       Intervention:   Session focused on interpersonal relationships. Discussed issues related to emotional reactivity, striving to be more intentional, less reactive to external stimuli. Talked about how interactions can escalate or diminish in emotional intensity, depending on the response chosen. Provided information about how to shift away from a process in which she tries to manage her emotions by controlling the environment (including other people) and instead focuses just on managing her emotions; this allows her to retain her power and be in control of her own emotional wellbeing, rather than being at the mercy of external factors (including other people). Began discussing Client's reaction to handout on common thought distortions. Will continue next session. Client stated that interactions at work have been somewhat less distressing with her new mindset.        ASSESSMENT: Current Emotional / Mental Status (status of significant symptoms):   Risk status (Self / Other harm or suicidal ideation)   Client denies current fears or concerns for personal safety.   Client denies current or recent suicidal ideation or  "behaviors.   Client denies current or recent homicidal ideation or behaviors.   Client denies current or recent self injurious behavior or ideation.   Client denies other safety concerns.   A safety and risk management plan has not been developed at this time, however client was given the after-hours number / 911 should there be a change in any of these risk factors.     Appearance:   Appropriate    Eye Contact:   Good    Psychomotor Behavior: Normal    Attitude:   Cooperative , engaged   Orientation:   All   Speech    Rate / Production: Normal     Volume:  Normal    Mood:    Anxious , reactive at times   Affect:    Appropriate    Thought Content:  Better able to choose neutral mindest at work    Thought Form:  Coherent  Logical    Insight:    Fair      Medication Review:   No changes to current psychiatric medication(s)     Medication Compliance:   Yes     Changes in Health Issues:   None reported     Chemical Use Review:   Substance Use: Chemical use reviewed, monitoring alcohol use     Tobacco Use: No current tobacco use.       Collateral Reports Completed:   Not Applicable                  PLAN: (Client Tasks / Therapist Tasks / Other)  Client will work on decreasing emotional reactivity, responding more honestly (\"ouch\") rather than angrily. She will talk with her significant other about how they can both best respond when the other is not at his/her best (with compassion rather than punishment). Client will practice shifting away from trying to manage her emotions by controlling the environment (including other people) and instead focus just on managing her emotions; this allows her to retain her power and be in control of her own emotional wellbeing, rather than being at the mercy of external factors. Will continue to discuss handout on thought distortions next session. She will work on increasing awareness of her internal monologue. Return appointments have been scheduled.      Leila Mendenhall, [place]              "                                         ________________________________________________________________________    Treatment Plan    Client's Name: Allie Thao  YOB: 1960    Date: 5/22/18    DSM-V Diagnoses: 300.09 (F41.8) Other Specified Anxiety Disorder , Mixed Anxiety and Depressive Disorder  Psychosocial / Contextual Factors: work stressors, interpersonal stressors  WHODAS: 22    Referral / Collaboration:  Referral to another professional/service is not indicated at this time.    Anticipated number of session or this episode of care: re-evaluate every 90 days      MeasurableTreatment Goal(s) related to diagnosis / functional impairment(s)  Goal 1: Client will identify >2 effective strategies for managing stressors at work.    I will know I've met my goal when I know how to handle situations where I feel like I'm being discriminated against because of my age and gender.      Objective #A (Client Action)    Client will use cognitive strategies identified in therapy to challenge anxious/unhelpful thoughts.  Status: New - Date: 5/22/18     Intervention(s)  Therapist will teach CBT strategies.    Objective #B  Client will use at least 2 coping skills for anxiety management in the next 8 weeks.  Status: New - Date: 5/22/18     Intervention(s)  Therapist will teach self-regulation, mindfulness, CBT skills.    Goal 2: Client will improve relationship with fiance.    I will know I've met my goal when I'm more comfortable with physical intimacy.      Objective #A (Client Action)    Status: New - Date: 5/22/18     Client will identify barriers to positive body image.    Intervention(s)  Therapist will provide psychoeducation, support.    Objective #B  Client will learn and use >2 effective interpersonal communication strategies.    Status: New - Date: 5/22/18     Intervention(s)  Therapist will teach assertive communication, speaker/listener technique.      Client has reviewed and agreed to the above  plan.      Leila Mendenhall, LP  May 22, 2018

## 2018-07-09 NOTE — MR AVS SNAPSHOT
MRN:[id number]                      After Visit Summary   7/9/2018    Allie Thao    MRN: [id number]           Visit Information        Provider Department      7/9/2018 1:00 PM Leila Mendenhall LP [facility]      Your next 10 appointments already scheduled     Jul 18, 2018  2:00 PM CDT   Return Visit with Leila Mendenhall LP   [facility] [place] ([facility])    [address] [address] [phone]              MyChart Information     [facility] gives you secure access to your electronic health record. If you see a primary care provider, you can also send messages to your care team and make appointments. If you have questions, please call your primary care clinic.  If you do not have a primary care provider, please call [phone] and they will assist you.        Care EveryWhere ID     This is your Care EveryWhere ID. This could be used by other organizations to access your [place] medical records  [id number]        Equal Access to Services     PRASANTH [facility] HospitalLESLIE : Hadii brent wills Solesly, waaxda luqadaha, qaybta kaalmada aderigoberto, renetta lozoya. So [facility] [phone].    ATENCIÓN: Si habla español, tiene a cross disposición servicios gratuitos de asistencia lingüística. Llame al [phone].    We comply with applicable federal civil rights laws and [place] laws. We do not discriminate on the basis of race, color, national origin, age, disability, sex, sexual orientation, or gender identity.

## 2018-07-18 ENCOUNTER — OFFICE VISIT (OUTPATIENT)
Dept: PSYCHOLOGY | Facility: CLINIC | Age: 58
End: 2018-07-18
Payer: COMMERCIAL

## 2018-07-18 DIAGNOSIS — F41.8 MIXED ANXIETY AND DEPRESSIVE DISORDER: Primary | ICD-10-CM

## 2018-07-18 PROCEDURE — 90834 PSYTX W PT 45 MINUTES: CPT | Performed by: PSYCHOLOGIST

## 2018-07-18 NOTE — PROGRESS NOTES
Progress Note    Client Name: Allie Thao  Date: 7/18/18         Service Type: Individual      Session Start Time: 14:00  Session End Time: 14:50      Session Length: 45-50     Session #: 5     Attendees: Client attended alone    Treatment Plan Last Reviewed: 5/22/18  PHQ-9 / REGINA-7 Last Reviewed : 5/15/18     DATA      Progress Since Last Session (Related to Symptoms / Goals / Homework):   Symptoms: Increased anxiety, irritability    Homework: In progress      Episode of Care Goals: Satisfactory progress - ACTION (Actively working towards change); Intervened by reinforcing change plan / affirming steps taken     Current / Ongoing Stressors and Concerns:   Work stressors   Interpersonal stressors     Treatment Objective(s) Addressed in This Session:   Complete treatment plan   identify >2 strategies to more effectively address stressors at work       Intervention:   Client reported significant stressor related to her recent performance review at work. She believes the review was unfair, setting her up to be let go. Discussed how she coped with this difficult event. She reported anger, frustration, increased crying initially. To her credit, she was able to reach out for support and engage in other areas of her life, which helped with mood stabilization. Reviewed her game plan for interacting at work in the weeks ahead. Encouraged Client to focus on her job performance (which she feels good about), validating her own work even if others do not recognize her contributions. suggested that self-care and rejuvenation will be particularly important as she darío with increased stress at work. Client is working hard not to personalize the actions of others. She is practicing new responses to negative behavior from her significant other, choosing to focus on managing her own emotions and respond honestly rather than escalating the situation.         ASSESSMENT: Current  "Emotional / Mental Status (status of significant symptoms):   Risk status (Self / Other harm or suicidal ideation)   Client denies current fears or concerns for personal safety.   Client denies current or recent suicidal ideation or behaviors.   Client denies current or recent homicidal ideation or behaviors.   Client denies current or recent self injurious behavior or ideation.   Client denies other safety concerns.   A safety and risk management plan has not been developed at this time, however client was given the after-hours number / 911 should there be a change in any of these risk factors.     Appearance:   Appropriate    Eye Contact:   Good    Psychomotor Behavior: Normal    Attitude:   Cooperative , engaged   Orientation:   All   Speech    Rate / Production: Normal , talkative    Volume:  Normal    Mood:    Anxious , reactive at times   Affect:    Appropriate    Thought Content:  Some personalization, mind reading, catastrophizing noted    Thought Form:  Coherent  Logical    Insight:    Fair      Medication Review:   No changes to current psychiatric medication(s)     Medication Compliance:   Yes     Changes in Health Issues:   None reported     Chemical Use Review:   Substance Use: Chemical use reviewed, monitoring alcohol use     Tobacco Use: No current tobacco use.       Collateral Reports Completed:   Not Applicable                  PLAN: (Client Tasks / Therapist Tasks / Other)  With increased work stressors, Client will focus on self-care: swimming, time with her dog, sleep, social interactions. Client will focus on internal validation of her daily work, reminding herself of the competence and care she brings to her daily responsibilities. She continues to practice decreasing emotional reactivity, responding more honestly (\"ouch\") rather than angrily. Client will practice shifting away from trying to manage her emotions by controlling the environment (including other people) and instead focus just on " managing her emotions; this allows her to retain her power and be in control of her own emotional wellbeing, rather than being at the mercy of external factors. Return appointments have been scheduled.      Leila CORDOBAAlyse Abdirashid, LP                                                      ________________________________________________________________________    Treatment Plan    Client's Name: Allie Thao  YOB: 1960    Date: 5/22/18    DSM-V Diagnoses: 300.09 (F41.8) Other Specified Anxiety Disorder , Mixed Anxiety and Depressive Disorder  Psychosocial / Contextual Factors: work stressors, interpersonal stressors  WHODAS: 22    Referral / Collaboration:  Referral to another professional/service is not indicated at this time.    Anticipated number of session or this episode of care: re-evaluate every 90 days      MeasurableTreatment Goal(s) related to diagnosis / functional impairment(s)  Goal 1: Client will identify >2 effective strategies for managing stressors at work.    I will know I've met my goal when I know how to handle situations where I feel like I'm being discriminated against because of my age and gender.      Objective #A (Client Action)    Client will use cognitive strategies identified in therapy to challenge anxious/unhelpful thoughts.  Status: New - Date: 5/22/18     Intervention(s)  Therapist will teach CBT strategies.    Objective #B  Client will use at least 2 coping skills for anxiety management in the next 8 weeks.  Status: New - Date: 5/22/18     Intervention(s)  Therapist will teach self-regulation, mindfulness, CBT skills.    Goal 2: Client will improve relationship with fiance.    I will know I've met my goal when I'm more comfortable with physical intimacy.      Objective #A (Client Action)    Status: New - Date: 5/22/18     Client will identify barriers to positive body image.    Intervention(s)  Therapist will provide psychoeducation, support.    Objective #B  Client will learn  and use >2 effective interpersonal communication strategies.    Status: New - Date: 5/22/18     Intervention(s)  Therapist will teach assertive communication, speaker/listener technique.      Client has reviewed and agreed to the above plan.      Leila Mendenhall, [place]  May 22, 2018

## 2018-07-18 NOTE — MR AVS SNAPSHOT
MRN:[id number]                      After Visit Summary   7/18/2018    Allie Thao    MRN: [id number]           Visit Information        Provider Department      7/18/2018 2:00 PM Leila Mendenhall LP [facility]      Your next 10 appointments already scheduled     Aug 14, 2018  2:00 PM CDT   Return Visit with Leila Mendenhall LP   [facility])    [address] [place] [id number]   [phone]            Aug 28, 2018  2:00 PM CDT   Return Visit with Leila Mendenhall LP   [facility] ([facility])    [address] [place] [id number]   [phone]            Sep 12, 2018  2:00 PM CDT   Return Visit with Leila Mendenhall LP   [facility] ([facility])    [address] [place] [id number]   [phone]              MyChart Information     [employer] gives you secure access to your electronic health record. If you see a primary care provider, you can also send messages to your care team and make appointments. If you have questions, please call your primary care clinic.  If you do not have a primary care provider, please call [phone] and they will assist you.        Care EveryWhere ID     This is your Care EveryWhere ID. This could be used by other organizations to access your [place] medical records  [id number]        Equal Access to Services     [facility] CenterLESLIE : Hadii aad ku hadasho Soomaali, waaxda luqadaha, qaybta kaalmada adeegyacoby, renetta polo . So [facility] [phone].    ATENCIÓN: Si habla español, tiene a cross disposición servicios gratuitos de asistencia lingüística. Llame al [phone].    We comply with applicable federal civil rights laws and [place] laws. We do not discriminate on the basis of race, color, national origin, age, disability, sex,  sexual orientation, or gender identity.

## 2018-08-28 ENCOUNTER — OFFICE VISIT (OUTPATIENT)
Dept: PSYCHOLOGY | Facility: CLINIC | Age: 58
End: 2018-08-28
Payer: COMMERCIAL

## 2018-08-28 DIAGNOSIS — F41.8 MIXED ANXIETY AND DEPRESSIVE DISORDER: Primary | ICD-10-CM

## 2018-08-28 PROCEDURE — 90834 PSYTX W PT 45 MINUTES: CPT | Performed by: PSYCHOLOGIST

## 2018-08-28 NOTE — PROGRESS NOTES
Progress Note    Client Name: Allie Thao  Date: 8/28/18         Service Type: Individual      Session Start Time: 14:00  Session End Time: 14:50      Session Length: 45-50     Session #: 6     Attendees: Client attended alone    Treatment Plan Last Reviewed: 5/22/18  PHQ-9 / REGINA-7 Last Reviewed : 5/15/18     DATA      Progress Since Last Session (Related to Symptoms / Goals / Homework):   Symptoms: Stable; slightly reduced anxiety at work    Homework: In progress      Episode of Care Goals: Satisfactory progress - ACTION (Actively working towards change); Intervened by reinforcing change plan / affirming steps taken     Current / Ongoing Stressors and Concerns:   Work stressors   Interpersonal stressors     Treatment Objective(s) Addressed in This Session:   Complete treatment plan   identify >2 strategies to more effectively address stressors at work       Intervention:   Client provided updates on developments at work. She has had some success with staying focused on her tasks and  from some of the interpersonal challenges in the department. Continued discussion of issues in the relationship with her significant other. Talked about how she can avoid feeling responsible for his behavior (which tells us about him, not about her). She is working on responding to his negativity in a different way. Client was able to articulate today that much of her frustration in the relationship comes from wanting to feel closer to him. Identified a few specific actions she could take in the short-term to move toward this goal. She reported some progress on being in the moment, allowing herself to enjoy positive times instead of focusing on worries. Reinforced her good efforts.      ASSESSMENT: Current Emotional / Mental Status (status of significant symptoms):   Risk status (Self / Other harm or suicidal ideation)   Client denies current fears or concerns for personal  "safety.   Client denies current or recent suicidal ideation or behaviors.   Client denies current or recent homicidal ideation or behaviors.   Client denies current or recent self injurious behavior or ideation.   Client denies other safety concerns.   A safety and risk management plan has not been developed at this time, however client was given the after-hours number / 911 should there be a change in any of these risk factors.     Appearance:   Appropriate    Eye Contact:   Good    Psychomotor Behavior: Normal    Attitude:   Open , engaged   Orientation:   All   Speech    Rate / Production: Normal , talkative    Volume:  Normal    Mood:    Anxious , slightly reduced reactivity   Affect:    Appropriate    Thought Content:  Some personalization, mind reading, catastrophizing noted    Thought Form:  Coherent  Logical    Insight:    Fair      Medication Review:   No changes to current psychiatric medication(s)     Medication Compliance:   Yes     Changes in Health Issues:   None reported     Chemical Use Review:   Substance Use: Chemical use reviewed, monitoring alcohol use     Tobacco Use: No current tobacco use.       Collateral Reports Completed:   Not Applicable                  PLAN: (Client Tasks / Therapist Tasks / Other)  Client will take action on 2 specific behaviors that will move her toward her goal of feeling more connected with her significant other. In the workplace, Client will focus on internal validation of her daily work, reminding herself of the competence and care she brings to her daily responsibilities. She continues to practice decreasing emotional reactivity, responding more honestly (\"ouch\") rather than angrily. Client will practice shifting away from trying to manage her emotions by controlling the environment (including other people) and instead focus just on managing her emotions; this allows her to retain her power and be in control of her own emotional wellbeing, rather than being at the " mercy of external factors. Return appointments have been scheduled.      Leila Mendenhall, LP                                                      ________________________________________________________________________    Treatment Plan    Client's Name: Allie Thao  YOB: 1960    Date: 5/22/18    DSM-V Diagnoses: 300.09 (F41.8) Other Specified Anxiety Disorder , Mixed Anxiety and Depressive Disorder  Psychosocial / Contextual Factors: work stressors, interpersonal stressors  WHODAS: 22    Referral / Collaboration:  Referral to another professional/service is not indicated at this time.    Anticipated number of session or this episode of care: re-evaluate every 90 days      MeasurableTreatment Goal(s) related to diagnosis / functional impairment(s)  Goal 1: Client will identify >2 effective strategies for managing stressors at work.    I will know I've met my goal when I know how to handle situations where I feel like I'm being discriminated against because of my age and gender.      Objective #A (Client Action)    Client will use cognitive strategies identified in therapy to challenge anxious/unhelpful thoughts.  Status: New - Date: 5/22/18     Intervention(s)  Therapist will teach CBT strategies.    Objective #B  Client will use at least 2 coping skills for anxiety management in the next 8 weeks.  Status: New - Date: 5/22/18     Intervention(s)  Therapist will teach self-regulation, mindfulness, CBT skills.    Goal 2: Client will improve relationship with fiance.    I will know I've met my goal when I'm more comfortable with physical intimacy.      Objective #A (Client Action)    Status: New - Date: 5/22/18     Client will identify barriers to positive body image.    Intervention(s)  Therapist will provide psychoeducation, support.    Objective #B  Client will learn and use >2 effective interpersonal communication strategies.    Status: New - Date: 5/22/18     Intervention(s)  Therapist will teach  assertive communication, speaker/listener technique.      Client has reviewed and agreed to the above plan.      Leila Mendenhall, [place]  May 22, 2018

## 2018-08-28 NOTE — MR AVS SNAPSHOT
MRN:[id number]                      After Visit Summary   8/28/2018    Allie Thao    MRN: [id number]           Visit Information        Provider Department      8/28/2018 2:00 PM Leila Mendenhall LP [facility]      Your next 10 appointments already scheduled     Sep 12, 2018  2:00 PM CDT   Return Visit with Leila Mendenhall LP   [facility] ([facility])    [address] [place] [phone]   [phone]            Oct 03, 2018  1:00 PM CDT   Return Visit with Leila Mendenhall LP   [facility] ([facility])    [address] [place] [address]   [phone]            Oct 17, 2018  1:00 PM CDT   Return Visit with Leila Mendenhall LP   [facility] ([facility])    [address] [place] [address]   [phone]            Oct 31, 2018  1:00 PM CDT   Return Visit with Leila Mendenhall LP   [facility] ([facility])    [address] [place] [address]   [phone]              MyChart Information     [employer] gives you secure access to your electronic health record. If you see a primary care provider, you can also send messages to your care team and make appointments. If you have questions, please call your primary care clinic.  If you do not have a primary care provider, please call [phone] and they will assist you.        Care EveryWhere ID     This is your Care EveryWhere ID. This could be used by other organizations to access your [place] medical records  [id number]        Equal Access to Services     GEORGIANA AVENDANO : Simón wills Solesly, waaxda luqadaha, qaybta kaalmarenetta doyle. [facility] [phone].    ATENCIÓN: Si habla español, tiene a cross disposición servicios gratuitos de  asistencia lingüística. Monika al [phone].    We comply with applicable federal civil rights laws and [place] laws. We do not discriminate on the basis of race, color, national origin, age, disability, sex, sexual orientation, or gender identity.

## 2018-09-12 ENCOUNTER — OFFICE VISIT (OUTPATIENT)
Dept: PSYCHOLOGY | Facility: CLINIC | Age: 58
End: 2018-09-12
Payer: COMMERCIAL

## 2018-09-12 DIAGNOSIS — F41.8 MIXED ANXIETY AND DEPRESSIVE DISORDER: Primary | ICD-10-CM

## 2018-09-12 PROCEDURE — 90834 PSYTX W PT 45 MINUTES: CPT | Performed by: PSYCHOLOGIST

## 2018-09-12 NOTE — PROGRESS NOTES
Progress Note    Client Name: Allie Thao  Date: 9/12/18         Service Type: Individual      Session Start Time: 14:00  Session End Time: 14:50      Session Length: 45-50     Session #: 7     Attendees: Client attended alone    Treatment Plan Last Reviewed: 5/22/18  PHQ-9 / REGINA-7 Last Reviewed : 5/15/18     DATA      Progress Since Last Session (Related to Symptoms / Goals / Homework):   Symptoms: Stable; slightly reduced anxiety at work    Homework: In progress      Episode of Care Goals: Satisfactory progress - ACTION (Actively working towards change); Intervened by reinforcing change plan / affirming steps taken     Current / Ongoing Stressors and Concerns:   Work stressors   Interpersonal stressors     Treatment Objective(s) Addressed in This Session:   Complete treatment plan   identify >2 strategies to more effectively address stressors at work       Intervention:   Client reported continued improvement in feeling secure in her job. She has been better able to recognize positive feedback when it occurs, feels less focused on interpersonal dynamics in the department.  Discussed Client's thoughts about her upcoming high school reunion. She is aware of her tendency to compare herself to others and feel bad about herself. Helped her identify her intention for this event and what she would like to be able to say at the end of the night. Worked on a new mindset--curiosity (about others) rather than comparison and how each person's choices/life/achievements can stand alone, unrelated to anyone else's. Client's goal is to feel comfortable in her own skin, reminding herself that she doesn't have to impress anyone. Continued work on issues in Client's romantic relationship.       ASSESSMENT: Current Emotional / Mental Status (status of significant symptoms):   Risk status (Self / Other harm or suicidal ideation)   Client denies current fears or concerns for personal  "safety.   Client denies current or recent suicidal ideation or behaviors.   Client denies current or recent homicidal ideation or behaviors.   Client denies current or recent self injurious behavior or ideation.   Client denies other safety concerns.   A safety and risk management plan has not been developed at this time, however client was given the after-hours number / 911 should there be a change in any of these risk factors.     Appearance:   Appropriate    Eye Contact:   Good    Psychomotor Behavior: Normal    Attitude:   Cooperative    Orientation:   All   Speech    Rate / Production: Normal , talkative    Volume:  Normal    Mood:    Anxious , slightly reduced reactivity   Affect:    Appropriate    Thought Content:  Improved balance noted    Thought Form:  Coherent  Logical    Insight:    Fair      Medication Review:   No changes to current psychiatric medication(s)     Medication Compliance:   Yes     Changes in Health Issues:   None reported     Chemical Use Review:   Substance Use: Chemical use reviewed, monitoring alcohol use     Tobacco Use: No current tobacco use.       Collateral Reports Completed:   Not Applicable                  PLAN: (Client Tasks / Therapist Tasks / Other)  At an upcoming high school reunion, Client will stay focused on her goal of feeling comfortable in her own skin, reminding herself that she doesn't have to impress anyone. She will work to cultivate a mindset of curiosity (about others) rather than comparison. In interpersonal relationships, she continues to practice decreasing emotional reactivity, responding more honestly (\"ouch\") rather than angrily. Client will practice shifting away from trying to manage her emotions by controlling the environment (including other people) and instead focus just on managing her emotions; this allows her to retain her power and be in control of her own emotional wellbeing, rather than being at the mercy of external factors. Return " appointments have been scheduled.      Leila Mendenhall, LP                                                      ________________________________________________________________________    Treatment Plan    Client's Name: Allie Thao  YOB: 1960    Date: 5/22/18    DSM-V Diagnoses: 300.09 (F41.8) Other Specified Anxiety Disorder , Mixed Anxiety and Depressive Disorder  Psychosocial / Contextual Factors: work stressors, interpersonal stressors  WHODAS: 22    Referral / Collaboration:  Referral to another professional/service is not indicated at this time.    Anticipated number of session or this episode of care: re-evaluate every 90 days      MeasurableTreatment Goal(s) related to diagnosis / functional impairment(s)  Goal 1: Client will identify >2 effective strategies for managing stressors at work.    I will know I've met my goal when I know how to handle situations where I feel like I'm being discriminated against because of my age and gender.      Objective #A (Client Action)    Client will use cognitive strategies identified in therapy to challenge anxious/unhelpful thoughts.  Status: New - Date: 5/22/18     Intervention(s)  Therapist will teach CBT strategies.    Objective #B  Client will use at least 2 coping skills for anxiety management in the next 8 weeks.  Status: New - Date: 5/22/18     Intervention(s)  Therapist will teach self-regulation, mindfulness, CBT skills.    Goal 2: Client will improve relationship with fiance.    I will know I've met my goal when I'm more comfortable with physical intimacy.      Objective #A (Client Action)    Status: New - Date: 5/22/18     Client will identify barriers to positive body image.    Intervention(s)  Therapist will provide psychoeducation, support.    Objective #B  Client will learn and use >2 effective interpersonal communication strategies.    Status: New - Date: 5/22/18     Intervention(s)  Therapist will teach assertive communication,  speaker/listener technique.      Client has reviewed and agreed to the above plan.      Leila Mendenhall, [place]  May 22, 2018

## 2018-09-12 NOTE — MR AVS SNAPSHOT
MRN:[id number]                      After Visit Summary   9/12/2018    Allie Thao    MRN: [id number]           Visit Information        Provider Department      9/12/2018 2:00 PM Leila Mendenhall LP [facility] Generic      Your next 10 appointments already scheduled     Oct 03, 2018  1:00 PM CDT   Return Visit with Leila Mendenhall LP   [facility] ([facility])    [address] [place] [address]   [phone]            Oct 17, 2018  1:00 PM CDT   Return Visit with Leila Mendenhall LP   [facility] [place])    [address] [place] [address]   [phone]            Oct 31, 2018  1:00 PM CDT   Return Visit with Leila Mendenhall LP   [facility])    [address] [place] [address]   [phone]              MyChart Information     [employer] gives you secure access to your electronic health record. If you see a primary care provider, you can also send messages to your care team and make appointments. If you have questions, please call your primary care clinic.  If you do not have a primary care provider, please call [phone] and they will assist you.        Care EveryWhere ID     This is your Care EveryWhere ID. This could be used by other organizations to access your [facility] medical records  [id number]        Equal Access to Services     [facility] : Hadii aad ku hadasho Soomaali, waaxda luqadaha, qaybta kaalmada adeegjanee, renetta polo . So [facility] [phone].    ATENCIÓN: Si habla español, tiene a cross disposición servicios gratuitos de asistencia lingüística. Llame al [phone].    We comply with applicable federal civil rights laws and [place] laws. We do not discriminate on the basis of race, color, national origin, age, disability, sex,  sexual orientation, or gender identity.

## 2018-10-03 ENCOUNTER — OFFICE VISIT (OUTPATIENT)
Dept: PSYCHOLOGY | Facility: CLINIC | Age: 58
End: 2018-10-03
Payer: COMMERCIAL

## 2018-10-03 DIAGNOSIS — F41.8 MIXED ANXIETY AND DEPRESSIVE DISORDER: Primary | ICD-10-CM

## 2018-10-03 PROCEDURE — 90834 PSYTX W PT 45 MINUTES: CPT | Performed by: PSYCHOLOGIST

## 2018-10-03 NOTE — PROGRESS NOTES
Progress Note    Client Name: Allie Thao  Date: 10/3/18         Service Type: Individual      Session Start Time: 13:00  Session End Time: 13:50      Session Length: 45-50     Session #: 8     Attendees: Client attended alone    Treatment Plan Last Reviewed: 5/22/18  PHQ-9 / REGINA-7 Last Reviewed : 5/15/18     DATA      Progress Since Last Session (Related to Symptoms / Goals / Homework):   Symptoms: Some improvement    Homework: In progress      Episode of Care Goals: Satisfactory progress - ACTION (Actively working towards change); Intervened by reinforcing change plan / affirming steps taken     Current / Ongoing Stressors and Concerns:   Work stressors   Interpersonal stressors     Treatment Objective(s) Addressed in This Session:   Complete treatment plan   identify >2 strategies to more effectively address stressors at work       Intervention:   Revisited Client's strategies for upcoming high school reunion (this weekend). Also discussed her successes on a recent road trip with her boyfriend. She chose a mindset of relaxation and light-heartedness and was able to let go of small annoyances or frustrations as they arose. Reinforced this as meaningful progress. Client shared concerns about significant road rage that she routinely experiences. She believes that her own reactions and emotions often lead her to behave in unsafe ways. Discussed the emotional reactivity Client experiences in this situation and the underlying beliefs (related to correcting injustices, taking the actions of others personally). Helped Client find new, more adaptive beliefs to cultivate (my only job is to drive safely myself; I can't worry about what anyone else is doing on the road; the lessons other people need to learn are not up to me, I have to focus on my own lessons). Talked about using the 5 senses to ground herself in the present moment, avoid the rush of the emotional escalation.  Client decided on an intention for herself when she gets behind the wheel (I love my car and I'm going to enjoy this ride). She will write this on a post-it note and keep on her dash.       ASSESSMENT: Current Emotional / Mental Status (status of significant symptoms):   Risk status (Self / Other harm or suicidal ideation)   Client denies current fears or concerns for personal safety.   Client denies current or recent suicidal ideation or behaviors.   Client denies current or recent homicidal ideation or behaviors.   Client denies current or recent self injurious behavior or ideation.   Client denies other safety concerns.   A safety and risk management plan has not been developed at this time, however client was given the after-hours number / 911 should there be a change in any of these risk factors.     Appearance:   Appropriate    Eye Contact:   Good    Psychomotor Behavior: Normal    Attitude:   Cooperative    Orientation:   All   Speech    Rate / Production: Normal , talkative    Volume:  Normal    Mood:    Anxious  , frequent anger/rage when driving    Affect:    Appropriate    Thought Content:  Improved mix of positive and negative thoughts    Thought Form:  Coherent  Logical    Insight:    Fair      Medication Review:   No changes to current psychiatric medication(s)     Medication Compliance:   Yes     Changes in Health Issues:   None reported     Chemical Use Review:   Substance Use: Chemical use reviewed, monitoring alcohol use     Tobacco Use: No current tobacco use.       Collateral Reports Completed:   Not Applicable                  PLAN: (Client Tasks / Therapist Tasks / Other)  When driving, Client will cultivate new, more adaptive beliefs (my only job is to drive safely myself; I can't worry about what anyone else is doing on the road; the lessons other people need to learn are not up to me, I have to focus on my own lessons). Talked about using the 5 senses to ground herself in the present moment  and avoid the rapid emotional escalation. Client decided on an intention for herself when she gets behind the wheel (I love my car and I'm going to enjoy this ride). She will write this on a post-it note and keep on her dash. At an upcoming high school reunion, Client will stay focused on her goal of feeling comfortable in her own skin, reminding herself that she doesn't have to impress anyone. She will work to cultivate a mindset of curiosity (about others) rather than comparison. Client will practice shifting away from trying to manage her emotions by controlling the environment (including other people) and instead focus just on managing her emotions; this allows her to retain her power and be in control of her own emotional wellbeing, rather than being at the mercy of external factors. Return appointments have been scheduled.      Leila Mendenhall, [facility]                                                      ________________________________________________________________________    Treatment Plan    Client's Name: Allie Thao  YOB: 1960    Date: 5/22/18    DSM-V Diagnoses: 300.09 (F41.8) Other Specified Anxiety Disorder , Mixed Anxiety and Depressive Disorder  Psychosocial / Contextual Factors: work stressors, interpersonal stressors  WHODAS: 22    Referral / Collaboration:  Referral to another professional/service is not indicated at this time.    Anticipated number of session or this episode of care: re-evaluate every 90 days      MeasurableTreatment Goal(s) related to diagnosis / functional impairment(s)  Goal 1: Client will identify >2 effective strategies for managing stressors at work.    I will know I've met my goal when I know how to handle situations where I feel like I'm being discriminated against because of my age and gender.      Objective #A (Client Action)    Client will use cognitive strategies identified in therapy to challenge anxious/unhelpful thoughts.  Status: New - Date: 5/22/18      Intervention(s)  Therapist will teach CBT strategies.    Objective #B  Client will use at least 2 coping skills for anxiety management in the next 8 weeks.  Status: New - Date: 5/22/18     Intervention(s)  Therapist will teach self-regulation, mindfulness, CBT skills.    Goal 2: Client will improve relationship with fiance.    I will know I've met my goal when I'm more comfortable with physical intimacy.      Objective #A (Client Action)    Status: New - Date: 5/22/18     Client will identify barriers to positive body image.    Intervention(s)  Therapist will provide psychoeducation, support.    Objective #B  Client will learn and use >2 effective interpersonal communication strategies.    Status: New - Date: 5/22/18     Intervention(s)  Therapist will teach assertive communication, speaker/listener technique.      Client has reviewed and agreed to the above plan.      Leila Mendenhall LP  May 22, 2018

## 2018-10-03 NOTE — MR AVS SNAPSHOT
MRN:[id number]                      After Visit Summary   10/3/2018    Allie Thao    MRN: [id number]           Visit Information        Provider Department      10/3/2018 1:00 PM Leila Mendenhall LP [facility] Generic      Your next 10 appointments already scheduled     Oct 17, 2018  8:40 AM CDT   PHYSICAL with Donna Cheatham MD   [facility] [place] [facility]    [address] [place] [address]   [phone]            Oct 17, 2018  1:00 PM CDT   Return Visit with Leila Mendenhall LP   [facility] [place]    [address] [place] [address]   [phone]            Oct 31, 2018  1:00 PM CDT   Return Visit with Leila Mendenhall LP   [facility] [place])    [address] [place] [address]   [phone]              MyChart Information     [employer] gives you secure access to your electronic health record. If you see a primary care provider, you can also send messages to your care team and make appointments. If you have questions, please call your primary care clinic.  If you do not have a primary care provider, please call [phone] and they will assist you.        Care EveryWhere ID     This is your Care EveryWhere ID. This could be used by other organizations to access your [facility] medical records  [id number]        Equal Access to Services     [facility] : Hadii aad ku hadasho Soomaali, waaxda luqadaha, qaybta kaalmada adeegyacoby, renetta polo . So [facility] [phone].    ATENCIÓN: Si habla español, tiene a cross disposición servicios gratuitos de asistencia lingüística. Llame al [phone].    We comply with applicable federal civil rights laws and [place] laws. We do not discriminate on the basis of race, color, national origin, age, disability, sex, sexual  orientation, or gender identity.

## 2018-10-17 ENCOUNTER — OFFICE VISIT (OUTPATIENT)
Dept: FAMILY MEDICINE | Facility: CLINIC | Age: 58
End: 2018-10-17
Payer: COMMERCIAL

## 2018-10-17 VITALS
DIASTOLIC BLOOD PRESSURE: 70 MMHG | HEIGHT: 62 IN | HEART RATE: 88 BPM | WEIGHT: 149 LBS | TEMPERATURE: 98.2 F | SYSTOLIC BLOOD PRESSURE: 122 MMHG | OXYGEN SATURATION: 98 % | BODY MASS INDEX: 27.42 KG/M2

## 2018-10-17 DIAGNOSIS — Z00.00 ANNUAL PHYSICAL EXAM: Primary | ICD-10-CM

## 2018-10-17 DIAGNOSIS — R06.83 SNORING: ICD-10-CM

## 2018-10-17 DIAGNOSIS — F41.9 ANXIETY: ICD-10-CM

## 2018-10-17 DIAGNOSIS — N95.1 HOT FLASH, MENOPAUSAL: ICD-10-CM

## 2018-10-17 DIAGNOSIS — E78.5 HYPERLIPIDEMIA LDL GOAL <160: ICD-10-CM

## 2018-10-17 DIAGNOSIS — F32.1 MAJOR DEPRESSIVE DISORDER, SINGLE EPISODE, MODERATE (H): ICD-10-CM

## 2018-10-17 DIAGNOSIS — E03.9 ACQUIRED HYPOTHYROIDISM: ICD-10-CM

## 2018-10-17 LAB
ALBUMIN SERPL-MCNC: 3.8 G/DL (ref 3.4–5)
ALP SERPL-CCNC: 111 U/L (ref 40–150)
ALT SERPL W P-5'-P-CCNC: 23 U/L (ref 0–50)
ANION GAP SERPL CALCULATED.3IONS-SCNC: 8 MMOL/L (ref 3–14)
AST SERPL W P-5'-P-CCNC: 20 U/L (ref 0–45)
BILIRUB SERPL-MCNC: 0.4 MG/DL (ref 0.2–1.3)
BUN SERPL-MCNC: 14 MG/DL (ref 7–30)
CALCIUM SERPL-MCNC: 9 MG/DL (ref 8.5–10.1)
CHLORIDE SERPL-SCNC: 103 MMOL/L (ref 94–109)
CHOLEST SERPL-MCNC: 145 MG/DL
CO2 SERPL-SCNC: 29 MMOL/L (ref 20–32)
CREAT SERPL-MCNC: 0.85 MG/DL (ref 0.52–1.04)
GFR SERPL CREATININE-BSD FRML MDRD: 69 ML/MIN/1.7M2
GLUCOSE SERPL-MCNC: 88 MG/DL (ref 70–99)
HDLC SERPL-MCNC: 52 MG/DL
HGB BLD-MCNC: 13.2 G/DL (ref 11.7–15.7)
LDLC SERPL CALC-MCNC: 72 MG/DL
NONHDLC SERPL-MCNC: 93 MG/DL
POTASSIUM SERPL-SCNC: 3.9 MMOL/L (ref 3.4–5.3)
PROT SERPL-MCNC: 7.5 G/DL (ref 6.8–8.8)
SODIUM SERPL-SCNC: 140 MMOL/L (ref 133–144)
TRIGL SERPL-MCNC: 104 MG/DL
TSH SERPL DL<=0.005 MIU/L-ACNC: 0.38 MU/L (ref 0.4–4)

## 2018-10-17 PROCEDURE — 80061 LIPID PANEL: CPT | Performed by: FAMILY MEDICINE

## 2018-10-17 PROCEDURE — 99214 OFFICE O/P EST MOD 30 MIN: CPT | Mod: 25 | Performed by: FAMILY MEDICINE

## 2018-10-17 PROCEDURE — 80053 COMPREHEN METABOLIC PANEL: CPT | Performed by: FAMILY MEDICINE

## 2018-10-17 PROCEDURE — 84443 ASSAY THYROID STIM HORMONE: CPT | Performed by: FAMILY MEDICINE

## 2018-10-17 PROCEDURE — 85018 HEMOGLOBIN: CPT | Performed by: FAMILY MEDICINE

## 2018-10-17 PROCEDURE — 99396 PREV VISIT EST AGE 40-64: CPT | Performed by: FAMILY MEDICINE

## 2018-10-17 PROCEDURE — 36415 COLL VENOUS BLD VENIPUNCTURE: CPT | Performed by: FAMILY MEDICINE

## 2018-10-17 RX ORDER — VENLAFAXINE HYDROCHLORIDE 75 MG/1
75 CAPSULE, EXTENDED RELEASE ORAL DAILY
Qty: 90 CAPSULE | Refills: 1 | Status: SHIPPED | OUTPATIENT
Start: 2018-10-17 | End: 2019-05-09

## 2018-10-17 RX ORDER — ESTRADIOL 0.1 MG/G
2 CREAM VAGINAL
COMMUNITY
End: 2020-10-20

## 2018-10-17 RX ORDER — SIMVASTATIN 10 MG
10 TABLET ORAL AT BEDTIME
Qty: 90 TABLET | Refills: 3 | Status: SHIPPED | OUTPATIENT
Start: 2018-10-17 | End: 2019-10-16

## 2018-10-17 RX ORDER — LEVOTHYROXINE SODIUM 88 MCG
88 TABLET ORAL DAILY
Qty: 90 TABLET | Refills: 1 | Status: SHIPPED | OUTPATIENT
Start: 2018-10-17 | End: 2019-04-11

## 2018-10-17 RX ORDER — BUPROPION HYDROCHLORIDE 300 MG/1
300 TABLET ORAL DAILY
Qty: 90 TABLET | Refills: 1 | Status: SHIPPED | OUTPATIENT
Start: 2018-10-17 | End: 2019-05-09

## 2018-10-17 RX ORDER — LEVOTHYROXINE SODIUM 100 UG/1
100 TABLET ORAL DAILY
Qty: 90 TABLET | Refills: 3 | Status: SHIPPED | OUTPATIENT
Start: 2018-10-17 | End: 2019-07-30

## 2018-10-17 RX ORDER — CLONIDINE HYDROCHLORIDE 0.1 MG/1
TABLET ORAL
Qty: 30 TABLET | Refills: 0 | Status: SHIPPED | OUTPATIENT
Start: 2018-10-17 | End: 2018-11-16

## 2018-10-17 ASSESSMENT — ANXIETY QUESTIONNAIRES
6. BECOMING EASILY ANNOYED OR IRRITABLE: SEVERAL DAYS
3. WORRYING TOO MUCH ABOUT DIFFERENT THINGS: NOT AT ALL
2. NOT BEING ABLE TO STOP OR CONTROL WORRYING: NOT AT ALL
GAD7 TOTAL SCORE: 5
1. FEELING NERVOUS, ANXIOUS, OR ON EDGE: SEVERAL DAYS
5. BEING SO RESTLESS THAT IT IS HARD TO SIT STILL: SEVERAL DAYS
IF YOU CHECKED OFF ANY PROBLEMS ON THIS QUESTIONNAIRE, HOW DIFFICULT HAVE THESE PROBLEMS MADE IT FOR YOU TO DO YOUR WORK, TAKE CARE OF THINGS AT HOME, OR GET ALONG WITH OTHER PEOPLE: SOMEWHAT DIFFICULT
7. FEELING AFRAID AS IF SOMETHING AWFUL MIGHT HAPPEN: SEVERAL DAYS

## 2018-10-17 ASSESSMENT — PATIENT HEALTH QUESTIONNAIRE - PHQ9: 5. POOR APPETITE OR OVEREATING: SEVERAL DAYS

## 2018-10-17 NOTE — MR AVS SNAPSHOT
After Visit Summary   10/17/2018    Allie Thao    MRN: [id number]           Patient Information     Date Of Birth          1960        Visit Information        Provider Department      10/17/2018 8:40 AM Donna Cheatham MD [facility]        Today's Diagnoses     Snoring    -  1    Hyperlipidemia LDL goal <160        Acquired hypothyroidism        Moderate Depression [296.22]        Anxiety        Hot flash, menopausal          Care Instructions      Preventive Health Recommendations  Female Ages 50 - 64    Yearly exam: See your health care provider every year in order to  o Review health changes.   o Discuss preventive care.    o Review your medicines if your doctor has prescribed any.      Get a Pap test every three years (unless you have an abnormal result and your provider advises testing more often).    If you get Pap tests with HPV test, you only need to test every 5 years, unless you have an abnormal result.     You do not need a Pap test if your uterus was removed (hysterectomy) and you have not had cancer.    You should be tested each year for STDs (sexually transmitted diseases) if you're at risk.     Have a mammogram every 1 to 2 years.    Have a colonoscopy at age 50, or have a yearly FIT test (stool test). These exams screen for colon cancer.      Have a cholesterol test every 5 years, or more often if advised.    Have a diabetes test (fasting glucose) every three years. If you are at risk for diabetes, you should have this test more often.     If you are at risk for osteoporosis (brittle bone disease), think about having a bone density scan (DEXA).    Shots: Get a flu shot each year. Get a tetanus shot every 10 years.    Nutrition:     Eat at least 5 servings of fruits and vegetables each day.    Eat whole-grain bread, whole-wheat pasta and brown rice instead of white grains and rice.    Get adequate Calcium and Vitamin D.     Lifestyle    Exercise at least 150  minutes a week (30 minutes a day, 5 days a week). This will help you control your weight and prevent disease.    Limit alcohol to one drink per day.    No smoking.     Wear sunscreen to prevent skin cancer.     See your dentist every six months for an exam and cleaning.    See your eye doctor every 1 to 2 years.            Follow-ups after your visit        Additional Services     SLEEP EVALUATION & MANAGEMENT REFERRAL - [facility] [phone]  (Age 18 and up)       Please be aware that coverage of these services is subject to the terms and limitations of your health insurance plan.  Call member services at your health plan with any benefit or coverage questions.      Please bring the following to your appointment:    >>   List of current medications   >>   This referral request   >>   Any documents/labs given to you for this referral                      Follow-up notes from your care team     Return in about 6 months (around 4/17/2019) for Mood Recheck.      Your next 10 appointments already scheduled     Oct 31, 2018  1:00 PM CDT   Return Visit with Leila Mendenhall LP   [facility] ([facility])    [address] [place] [address] [phone]              Future tests that were ordered for you today     Open Future Orders        Priority Expected Expires Ordered    SLEEP EVALUATION & MANAGEMENT REFERRAL - [facility] [phone]  (Age 18 and up) Routine  10/17/2019 10/17/2018            Who to contact     If you have questions or need follow up information about today's clinic visit or your schedule please contact [facility] [place] directly at [phone].  Normal or non-critical lab and imaging results will be communicated to you by MyChart, letter or phone within 4 business days after the clinic has received the results. If you do not hear from us within 7 days, please contact the  "clinic through appbackr or phone. If you have a critical or abnormal lab result, we will notify you by phone as soon as possible.  Submit refill requests through app[employer]r or call your pharmacy and they will forward the refill request to us. Please allow 3 business days for your refill to be completed.          Additional Information About Your Visit        [employer] Information     [employer] gives you secure access to your electronic health record. If you see a primary care provider, you can also send messages to your care team and make appointments. If you have questions, please call your primary care clinic.  If you do not have a primary care provider, please call [phone] and they will assist you.        Care EveryWhere ID     This is your Care EveryWhere ID. This could be used by other organizations to access your [facility] medical records  [id number]        Your Vitals Were     Pulse Temperature Height Last Period Pulse Oximetry BMI (Body Mass Index)    88 98.2  F (36.8  C) (Tympanic) 5' 2\" (1.575 m) 08/18/2010 98% 27.25 kg/m2       Blood Pressure from Last 3 Encounters:   10/17/18 122/70   03/27/18 112/74   01/09/18 108/78    Weight from Last 3 Encounters:   10/17/18 149 lb (67.6 kg)   03/27/18 150 lb (68 kg)   01/09/18 148 lb (67.1 kg)              We Performed the Following     Comprehensive metabolic panel     Hemoglobin     Lipid Profile     TSH          Today's Medication Changes          These changes are accurate as of 10/17/18  9:18 AM.  If you have any questions, ask your nurse or doctor.               These medicines have changed or have updated prescriptions.        Dose/Directions    buPROPion 300 MG 24 hr tablet   Commonly known as:  WELLBUTRIN XL   This may have changed:  See the new instructions.   Used for:  Major depressive disorder, single episode, moderate (H)   Changed by:  Donna Cheatham MD        Dose:  300 mg   Take 1 tablet (300 mg) by mouth daily   Quantity:  90 tablet   Refills:  1    "    cloNIDine 0.1 MG tablet   Commonly known as:  CATAPRES   This may have changed:    - how much to take  - how to take this  - when to take this  - additional instructions   Used for:  Hot flash, menopausal   Changed by:  Donna Cheatham MD        Take half tab twice a day for 1 week then half tab daily for 1 week and then stop.   Quantity:  30 tablet   Refills:  0            Where to get your medicines      These medications were sent to [facility] - [address]  [address] [place] [address]     Phone:  [phone]     buPROPion 300 MG 24 hr tablet    cloNIDine 0.1 MG tablet    levothyroxine 100 MCG tablet    simvastatin 10 MG tablet    venlafaxine 75 MG 24 hr capsule                Primary Care Provider Office Phone # Fax #    Donna Cheatham [phone] [address]  [place] [address]        Equal Access to Services     [facility]: Hadii brent rodriguez hadasho Sojonathanali, waaxda luqadaha, qaybta kaalmada adeegyada, waxay jamilain annita polo . So [facility] [phone].    ATENCIÓN: Si habla español, tiene a cross disposición servicios gratuitos de asistencia lingüística. [facility] [phone].    We comply with applicable federal civil rights laws and [place] laws. We do not discriminate on the basis of race, color, national origin, age, disability, sex, sexual orientation, or gender identity.            Thank you!     Thank you for choosing [facility] [place]  for your care. Our goal is always to provide you with excellent care. Hearing back from our patients is one way we can continue to improve our services. Please take a few minutes to complete the written survey that you may receive in the mail after your visit with us. Thank you!             Your Updated Medication List - Protect others around you: Learn how to safely use, store and throw away your medicines at www.disposemymeds.org.          This list  is accurate as of 10/17/18  9:18 AM.  Always use your most recent med list.                   Brand Name Dispense Instructions for use Diagnosis    ALLEGRA 180 MG tablet   Generic drug:  fexofenadine     90 tablet    Take 1 tablet by mouth daily.        buPROPion 300 MG 24 hr tablet    WELLBUTRIN XL    90 tablet    Take 1 tablet (300 mg) by mouth daily    Major depressive disorder, single episode, moderate (H)       Calcium-Vitamin D-Vitamin K 500-100-40 MG-UNT-MCG Chew     90 tablet         cloNIDine 0.1 MG tablet    CATAPRES    30 tablet    Take half tab twice a day for 1 week then half tab daily for 1 week and then stop.    Hot flash, menopausal       DAILY MULTIVITAMIN PO      Take 1 tablet by mouth daily        estradiol 0.1 MG/GM cream    ESTRACE     Place 2 g vaginally twice a week        fluticasone 50 MCG/ACT spray    FLONASE    16 g    Spray 2 sprays into both nostrils daily    Chronic rhinitis, unspecified type       levothyroxine 100 MCG tablet    SYNTHROID/LEVOTHROID    90 tablet    Take 1 tablet (100 mcg) by mouth daily    Acquired hypothyroidism       simvastatin 10 MG tablet    ZOCOR    90 tablet    Take 1 tablet (10 mg) by mouth At Bedtime    Hyperlipidemia LDL goal <160       SYSTANE BALANCE 0.6 % Soln ophthalmic solution   Generic drug:  propylene glycol      1 drop        venlafaxine 75 MG 24 hr capsule    EFFEXOR-XR    90 capsule    Take 1 capsule (75 mg) by mouth daily Take 1 capsule daily    Anxiety, Major depressive disorder, single episode, moderate (H)

## 2018-10-17 NOTE — PROGRESS NOTES
SUBJECTIVE:   CC: Allie Thao is an 58 year old woman who presents for preventive health visit.     Physical   Annual:     Getting at least 3 servings of Calcium per day:  NO    Bi-annual eye exam:  Yes    Dental care twice a year:  Yes    Sleep apnea or symptoms of sleep apnea:  Daytime drowsiness and Excessive snoring    Diet:  Regular (no restrictions)    Frequency of exercise:  1 day/week    Duration of exercise:  Less than 15 minutes    Taking medications regularly:  Yes    Additional concerns today:  YES        Hyperlipidemia Follow-Up      Rate your low fat/cholesterol diet?: good    Taking statin?  Yes, no muscle aches from statin    Other lipid medications/supplements?:  none    Depression and Anxiety Follow-Up    Status since last visit: No change    Other associated symptoms:None    Complicating factors:     Significant life event: No     Current substance abuse: None    PHQ 3/27/2018 5/13/2018 10/17/2018   PHQ-9 Total Score 7 5 2   Q9: Suicide Ideation Not at all Not at all Not at all     REGINA-7 SCORE 3/27/2018 5/13/2018 10/17/2018   Total Score - - -   Total Score - 6 (mild anxiety) -   Total Score 4 6 5       PHQ-9  English  PHQ-9   Any Language  REGINA-7  Suicide Assessment Five-step Evaluation and Treatment (SAFE-T)  Hypothyroidism Follow-up      Since last visit, patient describes the following symptoms: Weight stable, no hair loss, no skin changes, no constipation, no loose stools      Patient continues to have hot flashes.  Tells that clonidine has not helped her much.  Would like to wean off.  She does not have history of hypertension.  Reports that she is back on using estrogen cream.  She is exprencing a lot of dyspareunia and vaginal dryness.  She sees an OB/GYN for management.  Also was seen by pelvic floor therapy people.  They told her she might be constipated.  She agrees to that but she does not think constipation is causing her dyspareunia.      Complains of snoring.  Her brother and  mother both have sleep apnea.  Her significant partner has sleep apnea and uses CPAP therefore he never told her if she ever gasped for air or snoring.      Today's PHQ-2 Score:   PHQ-2 ( 1999 [employer]) 10/15/2018   Q1: Little interest or pleasure in doing things 0   Q2: Feeling down, depressed or hopeless 0   PHQ-2 Score 0   Q1: Little interest or pleasure in doing things Not at all   Q2: Feeling down, depressed or hopeless Not at all   PHQ-2 Score 0       Abuse: Current or Past(Physical, Sexual or Emotional)- No  Do you feel safe in your environment - Yes    Social History   Substance Use Topics     Smoking status: Former Smoker     Packs/day: 1.50     Years: 11.00     Types: Cigarettes     Quit date: 1/1/1988     Smokeless tobacco: Never Used     Alcohol use 0.0 oz/week     0 Standard drinks or equivalent per week      Comment: 0-3 beers a week     Alcohol Use 10/15/2018   If you drink alcohol do you typically have greater than 3 drinks per day OR greater than 7 drinks per week? No       Reviewed orders with patient.  Reviewed health maintenance and updated orders accordingly - Yes  Labs reviewed in EPIC  BP Readings from Last 3 Encounters:   10/17/18 122/70   03/27/18 112/74   01/09/18 108/78    Wt Readings from Last 3 Encounters:   10/17/18 149 lb (67.6 kg)   03/27/18 150 lb (68 kg)   01/09/18 148 lb (67.1 kg)                  Patient Active Problem List   Diagnosis     Osteoarthritis cervical spine     Allergic rhinitis     History of basal cell carcinoma     Idiopathic urticaria     Hashimoto's thyroiditis     HYPERLIPIDEMIA LDL GOAL <160     Moderate Depression [296.22]     Flat epithelial atypia of breast     Advanced directives, counseling/discussion     Acquired hypothyroidism     Chronic rhinitis, unspecified type     Past Surgical History:   Procedure Laterality Date     COLONOSCOPY  8/2010    4 mm polyps mid sigmoid and mid descending colon, repeat 5 years     DEXA  10/2010    T score lumbar 0.8, femoral  neck -0.8/-0.2 with BMD 0.975     HC EXC MALIG SKIN LESION TRUNK/ARM/LEG <=0.5 CM  2005    excision basal cell right forearm     HC EXCISION BREAST LESION W XRAY MARKER, OPEN SINGLE  2010    Right breast excisional biopsy: Focal complex sclerosing lesion, focal columnar cell hyperplasia, adenosis with microcalcifications, no atypia     HC MRI CERVICAL SPINE W/O CONTRAST  10/2009    mulitilevel disc and facet joint denerative changes, mild spinal stenosis lower cervical levels, varying degrees of foraminal stenosis rt>lt (especially C4-5)     HC NCS MOTOR W/O F-WAVE, EACH NERVE  2009    mild left and very mild right median nerve neuropathy     HC REPAIR INTERMED, WOUND TRUNK/ARM/LEG 7.6-12 CM  1970s    complex closure right knee wound     MRA ANGIOGRAM BRAIN & MRI BRAIN W/O CONTRAST  2013    normal     SONO PELVIS COMPLETE  2013    2 small unterine myoma (12 and 14 mm) NOT approximating endometrium, 4  mm endoemtrial thickness       Social History   Substance Use Topics     Smoking status: Former Smoker     Packs/day: 1.50     Years: 11.00     Types: Cigarettes     Quit date: 1988     Smokeless tobacco: Never Used     Alcohol use 0.0 oz/week     0 Standard drinks or equivalent per week      Comment: 0-3 beers a week     Family History   Problem Relation Age of Onset     Lipids Mother      Cancer Mother      several basal cell skin cancers     HEART DISEASE Mother      atrial fibrillation/pacemaker, rheumatic heart disease - valve replacement     GASTROINTESTINAL DISEASE Mother      GI bleeding from gastritis/diverticulitis     Alcohol/Drug Father      Respiratory Father      COPD,  66, smoker     Alzheimer Disease Maternal Grandmother      onset early 80s     Cancer - colorectal Maternal Grandfather       age 80     Breast Cancer Maternal Aunt      onset early 60s     Blood Disease Sister      thrombophlebitis, negative factor testing     Connective Tissue Disorder Brother      bilateral hip  replacement 40s for slipped capital epiphyses     Neurologic Disorder Other      nieces/nephews with Tourette's     Osteoarthritis Sister      hands         Current Outpatient Prescriptions   Medication Sig Dispense Refill     buPROPion (WELLBUTRIN XL) 300 MG 24 hr tablet Take 1 tablet (300 mg) by mouth daily 90 tablet 1     Calcium-Vitamin D-Vitamin K 500-100-40 MG-UNT-MCG CHEW  90 tablet      cloNIDine (CATAPRES) 0.1 MG tablet Take half tab twice a day for 1 week then half tab daily for 1 week and then stop. 30 tablet 0     estradiol (ESTRACE) 0.1 MG/GM cream Place 2 g vaginally twice a week       fexofenadine (ALLEGRA) 180 MG tablet Take 1 tablet by mouth daily. 90 tablet 3     fluticasone (FLONASE) 50 MCG/ACT spray Spray 2 sprays into both nostrils daily 16 g 11     levothyroxine (SYNTHROID/LEVOTHROID) 100 MCG tablet Take 1 tablet (100 mcg) by mouth daily 90 tablet 3     Multiple Vitamin (DAILY MULTIVITAMIN PO) Take 1 tablet by mouth daily       propylene glycol (SYSTANE BALANCE) 0.6 % SOLN ophthalmic solution 1 drop       simvastatin (ZOCOR) 10 MG tablet Take 1 tablet (10 mg) by mouth At Bedtime 90 tablet 3     venlafaxine (EFFEXOR-XR) 75 MG 24 hr capsule Take 1 capsule (75 mg) by mouth daily Take 1 capsule daily 90 capsule 1     [DISCONTINUED] buPROPion (WELLBUTRIN XL) 300 MG 24 hr tablet TAKE 1 TABLET DAILY 90 tablet 0     [DISCONTINUED] cloNIDine (CATAPRES) 0.1 MG tablet Take 1 tablet (0.1 mg) by mouth 2 times daily 180 tablet 3     [DISCONTINUED] levothyroxine (SYNTHROID/LEVOTHROID) 100 MCG tablet Take 1 tablet (100 mcg) by mouth daily 90 tablet 3     [DISCONTINUED] simvastatin (ZOCOR) 10 MG tablet Take 1 tablet (10 mg) by mouth At Bedtime 90 tablet 3     [DISCONTINUED] venlafaxine (EFFEXOR-XR) 75 MG 24 hr capsule Take 1 capsule (75 mg) by mouth daily Take 1 capsule daily 90 capsule 1     Allergies   Allergen Reactions     Cats      Ragweeds        Patient over age 50, mutual decision to screen reflected in  "health maintenance.    Pertinent mammograms are reviewed under the imaging tab.  History of abnormal Pap smear: NO - age 30- 65 PAP every 3 years recommended  PAP / HPV Latest Ref Rng & Units 10/9/2017 9/17/2015 9/18/2013   PAP - NIL NIL NIL   HPV 16 DNA NEG:Negative Negative Negative -   HPV 18 DNA NEG:Negative Negative Negative -   OTHER HR HPV NEG:Negative Negative Negative -     Reviewed and updated as needed this visit by clinical staff  Tobacco  Allergies  Meds  Med Hx  Surg Hx  Fam Hx  Soc Hx        Reviewed and updated as needed this visit by Provider  Allergies            Review of Systems  CONSTITUTIONAL: NEGATIVE for fever, chills, change in weight  INTEGUMENTARY/SKIN: NEGATIVE for worrisome rashes, moles or lesions  EYES: NEGATIVE for vision changes or irritation  ENT: NEGATIVE for ear, mouth and throat problems  RESP: NEGATIVE for significant cough or SOB  BREAST: NEGATIVE for masses, tenderness or discharge  CV: NEGATIVE for chest pain, palpitations or peripheral edema  GI: NEGATIVE for nausea, abdominal pain, heartburn, or change in bowel habits  : NEGATIVE for unusual urinary or vaginal symptoms. No vaginal bleeding.  MUSCULOSKELETAL: NEGATIVE for significant arthralgias or myalgia  NEURO: NEGATIVE for weakness, dizziness or paresthesias  PSYCHIATRIC: NEGATIVE for changes in mood or affect      OBJECTIVE:   /70  Pulse 88  Temp 98.2  F (36.8  C) (Tympanic)  Ht 5' 2\" (1.575 m)  Wt 149 lb (67.6 kg)  LMP 08/18/2010  SpO2 98%  BMI 27.25 kg/m2  Physical Exam  GENERAL APPEARANCE: healthy, alert and no distress  EYES: Eyes grossly normal to inspection, PERRL and conjunctivae and sclerae normal  HENT: ear canals and TM's normal, nose and mouth without ulcers or lesions, oropharynx clear and oral mucous membranes moist  NECK: no adenopathy, no asymmetry, masses, or scars and thyroid normal to palpation  RESP: lungs clear to auscultation - no rales, rhonchi or wheezes  BREAST: normal " without masses, tenderness or nipple discharge and no palpable axillary masses or adenopathy  CV: regular rate and rhythm, normal S1 S2, no S3 or S4, no murmur, click or rub, no peripheral edema and peripheral pulses strong  ABDOMEN: soft, nontender, no hepatosplenomegaly, no masses and bowel sounds normal  MS: no musculoskeletal defects are noted and gait is age appropriate without ataxia  SKIN: no suspicious lesions or rashes  NEURO: Normal strength and tone, sensory exam grossly normal, mentation intact and speech normal  PSYCH: mentation appears normal and affect normal/bright        ASSESSMENT/PLAN:   1. Annual physical exam      2. Hyperlipidemia LDL goal <160  Previously well controlled.    - simvastatin (ZOCOR) 10 MG tablet; Take 1 tablet (10 mg) by mouth At Bedtime  Dispense: 90 tablet; Refill: 3  - Lipid Profile  - Comprehensive metabolic panel    3. Acquired hypothyroidism  Previously well managed.  - levothyroxine (SYNTHROID/LEVOTHROID) 100 MCG tablet; Take 1 tablet (100 mcg) by mouth daily  Dispense: 90 tablet; Refill: 3  - TSH    4. Moderate Depression [296.22]  Well-controlled.  Resume current medications.  - buPROPion (WELLBUTRIN XL) 300 MG 24 hr tablet; Take 1 tablet (300 mg) by mouth daily  Dispense: 90 tablet; Refill: 1  - venlafaxine (EFFEXOR-XR) 75 MG 24 hr capsule; Take 1 capsule (75 mg) by mouth daily Take 1 capsule daily  Dispense: 90 capsule; Refill: 1    5. Anxiety  Stable enough to resume the current dose of medication.  Six-month follow-up recommended  - venlafaxine (EFFEXOR-XR) 75 MG 24 hr capsule; Take 1 capsule (75 mg) by mouth daily Take 1 capsule daily  Dispense: 90 capsule; Refill: 1    6. Hot flash, menopausal  Not well controlled with the use of clonidine.  Patient would like to wean off.  Weaning off plan given to her.  Monitor blood pressure while weaning off.  If any concerns noted, instructed to notify me back.  - cloNIDine (CATAPRES) 0.1 MG tablet; Take half tab twice a day  "for 1 week then half tab daily for 1 week and then stop.  Dispense: 30 tablet; Refill: 0  - Hemoglobin    7. Snoring  Patient would like a sleep evaluation.  She snores a lot.  - SLEEP EVALUATION & MANAGEMENT REFERRAL - ADULT -[facility] [phone]  (Age 18 and up); Future    COUNSELING:  Reviewed preventive health counseling, as reflected in patient instructions       Regular exercise       Healthy diet/nutrition    BP Readings from Last 1 Encounters:   10/17/18 122/70     Estimated body mass index is 27.25 kg/(m^2) as calculated from the following:    Height as of this encounter: 5' 2\" (1.575 m).    Weight as of this encounter: 149 lb (67.6 kg).      Weight management plan: Discussed healthy diet and exercise guidelines and patient will follow up in 12 months in clinic to re-evaluate.     reports that she quit smoking about 30 years ago. Her smoking use included Cigarettes. She has a 16.50 pack-year smoking history. She has never used smokeless tobacco.      Counseling Resources:  ATP IV Guidelines  Pooled Cohorts Equation Calculator  Breast Cancer Risk Calculator  FRAX Risk Assessment  ICSI Preventive Guidelines  Dietary Guidelines for Americans, 2010  USDA's MyPlate  ASA Prophylaxis  Lung CA Screening    Donna Cheatham MD  [facility] [place]  "

## 2018-10-18 ASSESSMENT — PATIENT HEALTH QUESTIONNAIRE - PHQ9: SUM OF ALL RESPONSES TO PHQ QUESTIONS 1-9: 2

## 2018-10-18 ASSESSMENT — ANXIETY QUESTIONNAIRES: GAD7 TOTAL SCORE: 5

## 2018-10-31 ENCOUNTER — OFFICE VISIT (OUTPATIENT)
Dept: PSYCHOLOGY | Facility: CLINIC | Age: 58
End: 2018-10-31
Payer: COMMERCIAL

## 2018-10-31 DIAGNOSIS — F41.8 MIXED ANXIETY AND DEPRESSIVE DISORDER: Primary | ICD-10-CM

## 2018-10-31 PROCEDURE — 90834 PSYTX W PT 45 MINUTES: CPT | Performed by: PSYCHOLOGIST

## 2018-10-31 NOTE — MR AVS SNAPSHOT
MRN:[id number]                      After Visit Summary   10/31/2018    Allie Thao    MRN: [id number]           Visit Information        Provider Department      10/31/2018 1:00 PM Leila Mendenhall LP [facility]      Your next 10 appointments already scheduled     Nov 16, 2018  3:30 PM CST   New Sleep Patient with Kev Luevano MD   [facility] ([facility])    [address] [place] [id number]   [phone]            Dec 18, 2018  2:00 PM CST   Return Visit with Leila Mendenhall LP   [facility] [place] ([facility])    [address] [place] [id number]   [phone]            Jan 23, 2019  2:00 PM CST   Return Visit with Leila Mendenhall LP   [facility] [place] ([facility])    [address] [place] [id number]   [phone]            Feb 06, 2019  2:00 PM CST   Return Visit with Leila Mendenhall LP   [facility] ([facility])    [address] [place] [id number]   [phone]              MyChart Information     [facility] gives you secure access to your electronic health record. If you see a primary care provider, you can also send messages to your care team and make appointments. If you have questions, please call your primary care clinic.  If you do not have a primary care provider, please call [phone] and they will assist you.        Care EveryWhere ID     This is your Care EveryWhere ID. This could be used by other organizations to access your [facility] medical records  [id number]        Equal Access to Services     GEORGIANA AVENDANO : Simón wills Solesly, waaxda luqadaha, qaybta kaalmada adeegyacoby, renetta lozoya. [facility] [phone].    ATENCIÓN: Si habla español, tiene a cross disposición  servicios gratuitos de asistencia lingüística. Llame al [phone].    We comply with applicable federal civil rights laws and [place] laws. We do not discriminate on the basis of race, color, national origin, age, disability, sex, sexual orientation, or gender identity.

## 2018-10-31 NOTE — PROGRESS NOTES
Progress Note    Client Name: Allie Thao  Date: 10/31/18         Service Type: Individual      Session Start Time: 13:00  Session End Time: 13:55      Session Length: 55     Session #: 9     Attendees: Client attended alone    Treatment Plan Last Reviewed: 5/22/18  PHQ-9 / REGINA-7 Last Reviewed : 5/15/18     DATA      Progress Since Last Session (Related to Symptoms / Goals / Homework):   Symptoms: Variable    Homework: In progress      Episode of Care Goals: Satisfactory progress - ACTION (Actively working towards change); Intervened by reinforcing change plan / affirming steps taken     Current / Ongoing Stressors and Concerns:   Work stressors   Interpersonal stressors     Treatment Objective(s) Addressed in This Session:   Complete treatment plan   identify >2 strategies to more effectively address stressors at work       Intervention:   Client reported that she has not had much success with moderating road rage. The intention she developed for herself (I love my car and I'm going to enjoy this ride) has not been compelling enough to slow down the intense emotional reaction she experiences. Agreed she will continue to work on this. Focus of today s session was on modifying long-standing habits of personalizing other people s behavior and comparing herself to other women. Discussed the underlying insecurity driving these patterns. Worked on a new response--deep breath to center herself and then reminding herself that she doesn t have to compete with anyone else, doesn t have to be any different than who she is, repeat  I am enough.  She stated that she has been working with a menopause specialist and believes she may be finding some relief from her symptoms using gabapentin. Although it is still early, she has noted perhaps some decrease in anxiety with gabapentin as well.        ASSESSMENT: Current Emotional / Mental Status (status of significant symptoms):   Risk  status (Self / Other harm or suicidal ideation)   Client denies current fears or concerns for personal safety.   Client denies current or recent suicidal ideation or behaviors.   Client denies current or recent homicidal ideation or behaviors.   Client denies current or recent self injurious behavior or ideation.   Client denies other safety concerns.   A safety and risk management plan has not been developed at this time, however client was given the after-hours number / 911 should there be a change in any of these risk factors.     Appearance:   Appropriate    Eye Contact:   Good    Psychomotor Behavior: Normal    Attitude:   Cooperative , engaged   Orientation:   All   Speech    Rate / Production: Normal , talkative    Volume:  Normal    Mood:    Variable; times of increased anxiety, frequent anger/rage when driving    Affect:    Appropriate    Thought Content:  Frequent personalization and comparisons    Thought Form:  Coherent  Logical    Insight:    Fair      Medication Review:   No changes to current psychiatric medication(s)     Medication Compliance:   Yes     Changes in Health Issues:   None reported     Chemical Use Review:   Substance Use: Chemical use reviewed, no concerns reported     Tobacco Use: No current tobacco use.       Collateral Reports Completed:   Not Applicable                  PLAN: (Client Tasks / Therapist Tasks / Other)  Practice new response when she notices she is comparing herself to others: deep breath to center herself and then remind herself that she doesn t have to compete with anyone else, doesn t have to be any different than who she is, repeat  I am enough.  Continue to work on interrupting automatic habit of road rage. She will continue with menopause specialist. She will call to schedule return appointments.       Leila Mendenhall, [facility]                                                      ________________________________________________________________________    Treatment  Plan    Client's Name: Allie Thao  YOB: 1960    Date: 5/22/18    DSM-V Diagnoses: 300.09 (F41.8) Other Specified Anxiety Disorder , Mixed Anxiety and Depressive Disorder  Psychosocial / Contextual Factors: work stressors, interpersonal stressors  WHODAS: 22    Referral / Collaboration:  Referral to another professional/service is not indicated at this time.    Anticipated number of session or this episode of care: re-evaluate every 90 days      MeasurableTreatment Goal(s) related to diagnosis / functional impairment(s)  Goal 1: Client will identify >2 effective strategies for managing stressors at work.    I will know I've met my goal when I know how to handle situations where I feel like I'm being discriminated against because of my age and gender.      Objective #A (Client Action)    Client will use cognitive strategies identified in therapy to challenge anxious/unhelpful thoughts.  Status: New - Date: 5/22/18     Intervention(s)  Therapist will teach CBT strategies.    Objective #B  Client will use at least 2 coping skills for anxiety management in the next 8 weeks.  Status: New - Date: 5/22/18     Intervention(s)  Therapist will teach self-regulation, mindfulness, CBT skills.    Goal 2: Client will improve relationship with fiance.    I will know I've met my goal when I'm more comfortable with physical intimacy.      Objective #A (Client Action)    Status: New - Date: 5/22/18     Client will identify barriers to positive body image.    Intervention(s)  Therapist will provide psychoeducation, support.    Objective #B  Client will learn and use >2 effective interpersonal communication strategies.    Status: New - Date: 5/22/18     Intervention(s)  Therapist will teach assertive communication, speaker/listener technique.      Client has reviewed and agreed to the above plan.      Leila Mendenhall, [place]  May 22, 2018

## 2018-11-16 ENCOUNTER — OFFICE VISIT (OUTPATIENT)
Dept: SLEEP MEDICINE | Facility: CLINIC | Age: 58
End: 2018-11-16
Payer: COMMERCIAL

## 2018-11-16 VITALS
HEART RATE: 96 BPM | WEIGHT: 154 LBS | HEIGHT: 63 IN | OXYGEN SATURATION: 95 % | BODY MASS INDEX: 27.29 KG/M2 | DIASTOLIC BLOOD PRESSURE: 88 MMHG | SYSTOLIC BLOOD PRESSURE: 134 MMHG | RESPIRATION RATE: 16 BRPM

## 2018-11-16 DIAGNOSIS — G47.33 OSA (OBSTRUCTIVE SLEEP APNEA): ICD-10-CM

## 2018-11-16 PROCEDURE — 99244 OFF/OP CNSLTJ NEW/EST MOD 40: CPT | Performed by: INTERNAL MEDICINE

## 2018-11-16 RX ORDER — GABAPENTIN 100 MG/1
CAPSULE ORAL
Refills: 3 | COMMUNITY
Start: 2018-11-05 | End: 2019-10-23

## 2018-11-16 NOTE — MR AVS SNAPSHOT
After Visit Summary   11/16/2018    Allie Thao    MRN: [id number]           Patient Information     Date Of Birth          1960        Visit Information        Provider Department      11/16/2018 3:30 PM Kev Luevano MD [facility] [place]        Today's Diagnoses     ANIRUDH (obstructive sleep apnea)          Care Instructions      Your BMI is Body mass index is 27.72 kg/(m^2).  Weight management is a personal decision.  If you are interested in exploring weight loss strategies, the following discussion covers the approaches that may be successful. Body mass index (BMI) is one way to tell whether you are at a healthy weight, overweight, or obese. It measures your weight in relation to your height.  A BMI of 18.5 to 24.9 is in the healthy range. A person with a BMI of 25 to 29.9 is considered overweight, and someone with a BMI of 30 or greater is considered obese. More than two-thirds of American adults are considered overweight or obese.  Being overweight or obese increases the risk for further weight gain. Excess weight may lead to heart disease and diabetes.  Creating and following plans for healthy eating and physical activity may help you improve your health.  Weight control is part of healthy lifestyle and includes exercise, emotional health, and healthy eating habits. Careful eating habits lifelong are the mainstay of weight control. Though there are significant health benefits from weight loss, long-term weight loss with diet alone may be very difficult to achieve- studies show long-term success with dietary management in less than 10% of people. Attaining a healthy weight may be especially difficult to achieve in those with severe obesity. In some cases, medications, devices and surgical management might be considered.  What can you do?  If you are overweight or obese and are interested in methods for weight loss, you should discuss this with your provider.      Consider reducing daily calorie intake by 500 calories.     Keep a food journal.     Avoiding skipping meals, consider cutting portions instead.    Diet combined with exercise helps maintain muscle while optimizing fat loss. Strength training is particularly important for building and maintaining muscle mass. Exercise helps reduce stress, increase energy, and improves fitness. Increasing exercise without diet control, however, may not burn enough calories to loose weight.       Start walking three days a week 10-20 minutes at a time    Work towards walking thirty minutes five days a week     Eventually, increase the speed of your walking for 1-2 minutes at time    In addition, we recommend that you review healthy lifestyles and methods for weight loss available through the National Institutes of Health patient information sites:  http://win.niddk.nih.gov/publications/index.htm    And look into health and wellness programs that may be available through your health insurance provider, employer, local community center, or davian club.    Weight management plan: Patient was referred to their PCP to discuss a diet and exercise plan.            Follow-ups after your visit        Your next 10 appointments already scheduled     Dec 06, 2018  3:00 PM CST   HST  with BED 7 SH SLEEP   [facility] ([facility])    [address] [address]   [phone]            Dec 07, 2018  9:00 AM CST   HST Drop Off with BED 7 SH SLEEP   [facility])    [address] [address]   [phone]            Dec 14, 2018  4:45 PM CST   Telephone Visit with Kev Luevano MD   [facility] ([facility])    [address] [address]   [phone]           Note: this is not an onsite visit; there is no need to  come to the facility.            Dec 18, 2018  2:00 PM CST   Return Visit with Leila Mendenhall LP   [facility] [place] ([facility] [place]    [address] [place] [address]  [place] [address]   [phone]            Jan 23, 2019  2:00 PM CST   Return Visit with Leila Mendenhall LP   [facility] [place] ([facility] [place])    [facility]  [place] [address]   [phone]            Feb 06, 2019  2:00 PM CST   Return Visit with Leila Mendenhall LP   [facility] [place] ([facility] [place])    [address] [place] [address]  [place] [address]   [phone]              Future tests that were ordered for you today     Open Future Orders        Priority Expected Expires Ordered    HST-Home Sleep Apnea Test Routine  5/18/2019 11/16/2018            Who to contact     If you have questions or need follow up information about today's clinic visit or your schedule please contact [facility] directly at [phone].  Normal or non-critical lab and imaging results will be communicated to you by MyChart, letter or phone within 4 business days after the clinic has received the results. If you do not hear from us within 7 days, please contact the clinic through [facility] or phone. If you have a critical or abnormal lab result, we will notify you by phone as soon as possible.  Submit refill requests through [employer] or call your pharmacy and they will forward the refill request to us. Please allow 3 business days for your refill to be completed.          Additional Information About Your Visit        [facility] Information     [employer] gives you secure access to your electronic health record. If you see a primary care provider, you can also send messages to your care team and make appointments. If you have questions, please call your primary care clinic.  If you do not have a primary care provider, please call [phone] and they will  "assist you.        Care EveryWhere ID     This is your Care EveryWhere ID. This could be used by other organizations to access your [place] medical records  [id number]        Your Vitals Were     Pulse Respirations Height Last Period Pulse Oximetry BMI (Body Mass Index)    96 16 1.588 m (5' 2.5\") 08/18/2010 95% 27.72 kg/m2       Blood Pressure from Last 3 Encounters:   11/16/18 134/88   10/17/18 122/70   03/27/18 112/74    Weight from Last 3 Encounters:   11/16/18 69.9 kg (154 lb)   10/17/18 67.6 kg (149 lb)   03/27/18 68 kg (150 lb)              We Performed the Following     SLEEP EVALUATION & MANAGEMENT REFERRAL - ADULT -[facility] [phone]  (Age 18 and up)          Today's Medication Changes          These changes are accurate as of 11/16/18  4:20 PM.  If you have any questions, ask your nurse or doctor.               Stop taking these medicines if you haven't already. Please contact your care team if you have questions.     cloNIDine 0.1 MG tablet   Commonly known as:  CATAPRES   Stopped by:  Kev Luevano MD                    Primary Care Provider Office Phone # Fax #    Donna Cheatham [phone]       [address]  [place] [address]        Equal Access to Services     [facility] AH: Hadarcadio Whiting, waaxda lustephanie, qaybta kaalalexandria tucker, renetta polo . So [facility] [phone].    ATENCIÓN: Si habla español, tiene a cross disposición servicios gratuitos de asistencia lingüística. Monika al [phone].    We comply with applicable federal civil rights laws and [place] laws. We do not discriminate on the basis of race, color, national origin, age, disability, sex, sexual orientation, or gender identity.            Thank you!     Thank you for choosing [facility]  for your care. Our goal is always to provide you with excellent care. Hearing back from our patients is one way we " can continue to improve our services. Please take a few minutes to complete the written survey that you may receive in the mail after your visit with us. Thank you!             Your Updated Medication List - Protect others around you: Learn how to safely use, store and throw away your medicines at www.disposemymeds.org.          This list is accurate as of 11/16/18  4:20 PM.  Always use your most recent med list.                   Brand Name Dispense Instructions for use Diagnosis    ALLEGRA 180 MG tablet   Generic drug:  fexofenadine     90 tablet    Take 1 tablet by mouth daily.        buPROPion 300 MG 24 hr tablet    WELLBUTRIN XL    90 tablet    Take 1 tablet (300 mg) by mouth daily    Major depressive disorder, single episode, moderate (H)       Calcium-Vitamin D-Vitamin K 500-100-40 MG-UNT-MCG Chew     90 tablet         DAILY MULTIVITAMIN PO      Take 1 tablet by mouth daily        estradiol 0.1 MG/GM cream    ESTRACE     Place 2 g vaginally twice a week        fluticasone 50 MCG/ACT spray    FLONASE    16 g    Spray 2 sprays into both nostrils daily    Chronic rhinitis, unspecified type       gabapentin 100 MG capsule    NEURONTIN     TAKE 1 CAPSULE BY MOUTH EVERYDAY AT BEDTIME        * levothyroxine 100 MCG tablet    SYNTHROID/LEVOTHROID    90 tablet    Take 1 tablet (100 mcg) by mouth daily    Acquired hypothyroidism       * SYNTHROID 88 MCG tablet   Generic drug:  levothyroxine     90 tablet    Take 1 tablet (88 mcg) by mouth daily    Acquired hypothyroidism       simvastatin 10 MG tablet    ZOCOR    90 tablet    Take 1 tablet (10 mg) by mouth At Bedtime    Hyperlipidemia LDL goal <160       SYSTANE BALANCE 0.6 % Soln ophthalmic solution   Generic drug:  propylene glycol      1 drop        venlafaxine 75 MG 24 hr capsule    EFFEXOR-XR    90 capsule    Take 1 capsule (75 mg) by mouth daily Take 1 capsule daily    Anxiety, Major depressive disorder, single episode, moderate (H)       * Notice:  This list has  2 medication(s) that are the same as other medications prescribed for you. Read the directions carefully, and ask your doctor or other care provider to review them with you.

## 2018-11-16 NOTE — PATIENT INSTRUCTIONS

## 2018-11-16 NOTE — NURSING NOTE
"Chief Complaint   Patient presents with     Sleep Problem     Snoring, stop breathing, gasping, nightmares, teeth grinding       Initial /83  Pulse 96  Resp 16  Ht 1.588 m (5' 2.5\")  Wt 69.9 kg (154 lb)  LMP 08/18/2010  SpO2 95%  BMI 27.72 kg/m2 Estimated body mass index is 27.72 kg/(m^2) as calculated from the following:    Height as of this encounter: 1.588 m (5' 2.5\").    Weight as of this encounter: 69.9 kg (154 lb).    Medication Reconciliation: complete     ESS 9  Neck 34cm  Valerie Lance        "

## 2018-11-16 NOTE — PROGRESS NOTES
Sleep Consultation:    Date on this visit: 11/16/2018    Allie Thao is sent by Donna Cheatham for a sleep consultation regarding snoring and possible sleep apnea.    Primary Physician: Donna Cheatham     She has a history of Hashimoto's thyroiditis and depression.    Schedule - Works as  for [employer]. First alarm at 6 AM but doesn't get up until 6:45 or 7 AM.  Sometimes may oversleep and will work from home if she can.  No napping.  Gets into bed around 10 - 10:15 but will read until 11:30 or midnight.     Sleep Disordered Breathing - Was on girl's weekend and told she snores very loudly. Has both snort arousals and witnessed apneas.   Rare nocturia.  No nocturnal GERD.   Upon waking feels tired.  She denies morning headaches.  Does get morning dry mouth.  No morning sinus congestion.   Patient was counseled on the importance of driving while alert, to pull over if drowsy, or nap before getting into the vehicle if sleepy.    Movement/Behaviors - Does have somniloquy.  No somnambulism.  No sleep related eating.  Kicking and running, hitting, as dream enactment behavior.   She reports bruxism.      Patient reports typical restless legs syndrome symptoms.  Gets symptoms maybe a few times per month.    Alcohol use - Has 1 - 4 glasses per night (more on weekends).  Caffeine intake - None  Tobacco exposure - Remote former smoker  Illicit substances - None    Lives alone but sometimes stays with fiance at his place.  Has 1 pet, a dog (Alyce Bal). Dog sleeps next to her bed.    Does have family history of Obstructive Sleep Apnea on CPAP in mother and brother.    Allergies:    Allergies   Allergen Reactions     Cats      Ragweeds        Medications:    Current Outpatient Prescriptions   Medication Sig Dispense Refill     buPROPion (WELLBUTRIN XL) 300 MG 24 hr tablet Take 1 tablet (300 mg) by mouth daily 90 tablet 1     Calcium-Vitamin D-Vitamin K 500-100-40 MG-UNT-MCG CHEW  90 tablet       estradiol (ESTRACE) 0.1 MG/GM cream Place 2 g vaginally twice a week       fexofenadine (ALLEGRA) 180 MG tablet Take 1 tablet by mouth daily. 90 tablet 3     fluticasone (FLONASE) 50 MCG/ACT spray Spray 2 sprays into both nostrils daily 16 g 11     gabapentin (NEURONTIN) 100 MG capsule TAKE 1 CAPSULE BY MOUTH EVERYDAY AT BEDTIME  3     Multiple Vitamin (DAILY MULTIVITAMIN PO) Take 1 tablet by mouth daily       simvastatin (ZOCOR) 10 MG tablet Take 1 tablet (10 mg) by mouth At Bedtime 90 tablet 3     SYNTHROID 88 MCG tablet Take 1 tablet (88 mcg) by mouth daily 90 tablet 1     venlafaxine (EFFEXOR-XR) 75 MG 24 hr capsule Take 1 capsule (75 mg) by mouth daily Take 1 capsule daily 90 capsule 1     cloNIDine (CATAPRES) 0.1 MG tablet Take half tab twice a day for 1 week then half tab daily for 1 week and then stop. (Patient not taking: Reported on 11/16/2018) 30 tablet 0     levothyroxine (SYNTHROID/LEVOTHROID) 100 MCG tablet Take 1 tablet (100 mcg) by mouth daily (Patient not taking: Reported on 11/16/2018) 90 tablet 3     propylene glycol (SYSTANE BALANCE) 0.6 % SOLN ophthalmic solution 1 drop         Problem List:  Patient Active Problem List    Diagnosis Date Noted     Chronic rhinitis, unspecified type 10/09/2017     Priority: Medium     Acquired hypothyroidism 09/21/2016     Priority: Medium     Advanced directives, counseling/discussion 09/17/2015     Priority: Medium     Advance Care Planning 10/20/2015: ACP Review and Resources Provided:  Reviewed chart for advance care plan.  Allie Thao has no plan or code status on file. Discussed available resources and provided with information. Confirmed code status reflects current choices pending further ACP discussions.  Confirmed/documented legally designated decision maker(s). Added by Sharonda Enriquez             Moderate Depression [296.22] 09/08/2011     Priority: Medium     HYPERLIPIDEMIA LDL GOAL <160 10/31/2010     Priority: Medium     Hashimoto's  thyroiditis      Priority: Medium     with positive thyroid antibodies       Idiopathic urticaria 2010     Priority: Medium     History of basal cell carcinoma      Priority: Medium     basal cell right forearm       Allergic rhinitis      Priority: Medium     cats, grasses        Flat epithelial atypia of breast 2009     Priority: Medium     Flat epithelial atypia and proliferative fibrocystic change - excisional bx       Osteoarthritis cervical spine      Priority: Medium     C5/6/7          Past Medical/Surgical History:  Past Medical History:   Diagnosis Date     Allergic rhinitis     cats, grasses      Anxiety      Cancer (H)     basal cell carcinoma     Chronic idiopathic urticaria     eval with pos thyroid AB/JUAN M weakly pos/ESR 26/borderline low CH50, treated with doxepin, Singulair.  Cetirizine used for  flare     Flat epithelial atypia of breast 2009    Flat epithelial atypia and proliferative fibrocystic change - excisional bx          Hashimoto's thyroiditis     with positive thyroid antibodies     Herpes zoster 2012    painless rash on back, ? dx at work clinic     History of basal cell carcinoma     basal cell right forearm     Hypothyroidism 1994     Idiopathic angioedema ,     with urticaria     Iron defic anemia NEC 2009    mild     Major depressive disorder, single episode, moderate (H) 1977    depression with anxiety     Mixed hyperlipidemia 2006    started statin 2013     Osteoarthritis cervical spine 2003    C5/6/7     Personal history of colonic polyps 2010    tubular adenoma     Past Surgical History:   Procedure Laterality Date     COLONOSCOPY  2010    4 mm polyps mid sigmoid and mid descending colon, repeat 5 years     DEXA  10/2010    T score lumbar 0.8, femoral neck -0.8/-0.2 with BMD 0.975     HC EXC MALIG SKIN LESION TRUNK/ARM/LEG <=0.5 CM  2005    excision basal cell right forearm     HC EXCISION BREAST LESION W XRAY  MARKER, OPEN SINGLE  2/2010    Right breast excisional biopsy: Focal complex sclerosing lesion, focal columnar cell hyperplasia, adenosis with microcalcifications, no atypia     HC MRI CERVICAL SPINE W/O CONTRAST  10/2009    mulitilevel disc and facet joint denerative changes, mild spinal stenosis lower cervical levels, varying degrees of foraminal stenosis rt>lt (especially C4-5)     HC NCS MOTOR W/O F-WAVE, EACH NERVE  12/2009    mild left and very mild right median nerve neuropathy     HC REPAIR INTERMED, WOUND TRUNK/ARM/LEG 7.6-12 CM  1970s    complex closure right knee wound     MRA ANGIOGRAM BRAIN & MRI BRAIN W/O CONTRAST  4/2013    normal     SONO PELVIS COMPLETE  9/2013    2 small unterine myoma (12 and 14 mm) NOT approximating endometrium, 4  mm endoemtrial thickness       Social History:  Social History     Social History     Marital status: Single     Spouse name: single     Number of children: 0     Years of education: N/A     Occupational History      [employer]     Social History Main Topics     Smoking status: Former Smoker     Packs/day: 1.50     Years: 11.00     Types: Cigarettes     Quit date: 1/1/1988     Smokeless tobacco: Never Used     Alcohol use 0.0 oz/week     0 Standard drinks or equivalent per week      Comment: 0-3 beers a week     Drug use: No     Sexual activity: Yes     Partners: Male      Comment: same partner since 08/2012     Other Topics Concern      Service No     Blood Transfusions No     Caffeine Concern No     stoped caffeine spring 2009     Occupational Exposure No     Hobby Hazards No     Sleep Concern No     Stress Concern No     mod stress financially     Weight Concern Yes     wants to lose 10 pounds     Special Diet No     2 Viactiv daily     Back Care Yes     uses chiropractic for neck symptoms prn     Exercise Yes     low impact aerobics/walk 30 min 2 days per week     Bike Helmet No     Seat Belt Yes     Self-Exams No     Social History  "Narrative    Lives with dog.  Family in [facility].  2007: new job, stressful, more overtime. Started new relationship summer 2012 with a  who recently lost his wife to cancer.        Family History:  Family History   Problem Relation Age of Onset     Lipids Mother      Cancer Mother      several basal cell skin cancers     HEART DISEASE Mother      atrial fibrillation/pacemaker, rheumatic heart disease - valve replacement     GASTROINTESTINAL DISEASE Mother      GI bleeding from gastritis/diverticulitis     Alcohol/Drug Father      Respiratory Father      COPD,  66, smoker     Alzheimer Disease Maternal Grandmother      onset early 80s     Cancer - colorectal Maternal Grandfather       age 80     Breast Cancer Maternal Aunt      onset early 60s     Blood Disease Sister      thrombophlebitis, negative factor testing     Connective Tissue Disorder Brother      bilateral hip replacement 40s for slipped capital epiphyses     Neurologic Disorder Other      nieces/nephews with Tourette's     Osteoarthritis Sister      hands       Review of Systems:  A complete review of systems reviewed by me is negative with the exeption of what has been mentioned in the history of present illness.  Feels like she has a cold and still having hot flashes.  Night sweats  Rapid or irregular heart beats; chest pain or pressure  Numbness in arms or legs  New moles or skin changes  Shortness of breath with activity; dry cough  Constipation  Joint or bone pain  Depression; anxiety    Physical Examination:  Vitals: /88  Pulse 96  Resp 16  Ht 1.588 m (5' 2.5\")  Wt 69.9 kg (154 lb)  LMP 2010  SpO2 95%  BMI 27.72 kg/m2  BMI= Body mass index is 27.72 kg/(m^2).    Neck Cir (cm): 34 cm    [place] Total Score 2018   Total score - [place] 9     DEV Total Score: 11 (18 1522)    General appearance: No apparent distress, well groomed.    HEENT:   Head: Normocephalic, atraumatic.  Eyes: PERRL  Nose: Nares patent.  " No exudate.  No septal deviation noted.  Mouth: Teeth: Several crowns; good repair   Tongue: Normal  Oropharynx:  Mallampati Classification: II    Tonsils: Grade 2 Bilateral    Uvula: Normal    Neck: Supple without bruit.     Neck Cir (cm): 34 cm (11/16/2018  3:28 PM)    Cardiac: Regular rate and rhythm.  No murmurs.  Radial pulses are strong and symmetric.  Pulmonary: Symmetric air movement, lungs clear to auscultation bilaterally.  Musculoskeletal: No edema noted.  No clubbing or cyanosis.  Skin: Warm, dry, intact.  Neurologic: Alert and oriented to person, place and time   Gait is normal.  Psychiatric: Affect pleasant.  Mood good.    Impression/Plan:  1. ANIRUDH (obstructive sleep apnea)  I have a high suspicion for ANIRUDH based on presence of snoring, snort arousals, witnessed apneas, and excessive daytime sleepiness. We discussed pathophysiology of obstructive sleep apnea, testing with overnight polysomnography, and treatment modalities (CPAP only discussed at this visit). We discussed consequences of untreated ANIRUDH. Patient is interested in treatment and willing to undergo overnight sleep testing.    Home sleep testing is appropriate for this patient based on insurer criteria.  Study has been ordered today.      - SLEEP EVALUATION & MANAGEMENT REFERRAL - [facility] [phone]  (Age 18 and up)  - HST-Home Sleep Apnea Test; Future     Literature provided regarding sleep apnea.      She will follow up with me in approximately two weeks after her sleep study has been competed to review the results and discuss plan of care.       Home Sleep Apnea Testing reviewed.  Obstructive sleep apnea reviewed.  Complications of untreated sleep apnea were reviewed.    Kev Luevano MD, Sleep Physician  Nov 16, 2018     CC: Donna Cheatham

## 2018-11-30 ENCOUNTER — TELEPHONE (OUTPATIENT)
Dept: FAMILY MEDICINE | Facility: CLINIC | Age: 58
End: 2018-11-30

## 2018-11-30 NOTE — TELEPHONE ENCOUNTER
Pt's biometric form filled out and signed by .    Faxed to pt's insurance, copied and abstracted, and original mailed back to pt's home.    Philly Boyd CMA

## 2018-12-06 ENCOUNTER — OFFICE VISIT (OUTPATIENT)
Dept: SLEEP MEDICINE | Facility: CLINIC | Age: 58
End: 2018-12-06
Payer: COMMERCIAL

## 2018-12-06 DIAGNOSIS — G47.33 OSA (OBSTRUCTIVE SLEEP APNEA): ICD-10-CM

## 2018-12-06 PROCEDURE — G0399 HOME SLEEP TEST/TYPE 3 PORTA: HCPCS | Performed by: INTERNAL MEDICINE

## 2018-12-06 NOTE — MR AVS SNAPSHOT
After Visit Summary   12/6/2018    Allie Thao    MRN: [id number]           Patient Information     Date Of Birth          1960        Visit Information        Provider Department      12/6/2018 3:00 PM BED 7 SH SLEEP [facility]        Today's Diagnoses     ANIRUDH (obstructive sleep apnea)           Follow-ups after your visit        Your next 10 appointments already scheduled     Dec 07, 2018  9:00 AM CST   HST Drop Off with BED 7 SH SLEEP   [facility] ([facility])    [address]  [facility] [id number] [phone]            Dec 14, 2018  4:45 PM CST   Telephone Visit with Kev Luevano MD   [facility] ([facility])    [address]  [facility] [id number] [phone]           Note: this is not an onsite visit; there is no need to come to the facility.            Dec 18, 2018  2:00 PM CST   Return Visit with Leila Mendenhall LP   [facility] ([facility])    [address] [place] [id number]   [phone]            Jan 23, 2019  2:00 PM CST   Return Visit with Leila Mendenhall LP   [facility] ([facility])    [address] [place] [id number] [phone]            Feb 06, 2019  2:00 PM CST   Return Visit with Leila Mendenhall LP   [facility] ([facility])    [address] [place] [id number] [phone]              Who to contact     If you have questions or need follow up information about today's clinic visit or your schedule please contact [facility] directly at [phone].  Normal or non-critical lab and imaging results will be communicated to you by MyChart, letter or phone within 4 business days after the clinic has received the results. If you do not hear  from us within 7 days, please contact the clinic through [employer] or phone. If you have a critical or abnormal lab result, we will notify you by phone as soon as possible.  Submit refill requests through [employer] or call your pharmacy and they will forward the refill request to us. Please allow 3 business days for your refill to be completed.          Additional Information About Your Visit        [employer] Information     [employer] gives you secure access to your electronic health record. If you see a primary care provider, you can also send messages to your care team and make appointments. If you have questions, please call your primary care clinic.  If you do not have a primary care provider, please call [phone] and they will assist you.        Care EveryWhere ID     This is your Care EveryWhere ID. This could be used by other organizations to access your [place] medical records  [id number]        Your Vitals Were     Last Period                   08/18/2010            Blood Pressure from Last 3 Encounters:   11/16/18 134/88   10/17/18 122/70   03/27/18 112/74    Weight from Last 3 Encounters:   11/16/18 69.9 kg (154 lb)   10/17/18 67.6 kg (149 lb)   03/27/18 68 kg (150 lb)              We Performed the Following     HST-Home Sleep Apnea Test        Primary Care Provider Office Phone # Fax #    Donna Cheatham [phone]       [address] [place] [facility] [place] [address]        Equal Access to Services     [facility]: Hadii aad ku hadasho Soomaali, waaxda luqadaha, qaybta kaalmada adeegyada, renetta polo . So [facility] [phone].    ATENCIÓN: Si habla español, tiene a cross disposición servicios gratuitos de asistencia lingüística. Llame al [phone].    We comply with applicable federal civil rights laws and [place] laws. We do not discriminate on the basis of race, color, national origin, age, disability, sex, sexual orientation, or gender identity.             Thank you!     Thank you for choosing [facility]  for your care. Our goal is always to provide you with excellent care. Hearing back from our patients is one way we can continue to improve our services. Please take a few minutes to complete the written survey that you may receive in the mail after your visit with us. Thank you!             Your Updated Medication List - Protect others around you: Learn how to safely use, store and throw away your medicines at www.disposemymeds.org.          This list is accurate as of 12/6/18  3:13 PM.  Always use your most recent med list.                   Brand Name Dispense Instructions for use Diagnosis    ALLEGRA 180 MG tablet   Generic drug:  fexofenadine     90 tablet    Take 1 tablet by mouth daily.        buPROPion 300 MG 24 hr tablet    WELLBUTRIN XL    90 tablet    Take 1 tablet (300 mg) by mouth daily    Major depressive disorder, single episode, moderate (H)       Calcium-Vitamin D-Vitamin K 500-100-40 MG-UNT-MCG Chew     90 tablet         DAILY MULTIVITAMIN PO      Take 1 tablet by mouth daily        estradiol 0.1 MG/GM vaginal cream    ESTRACE     Place 2 g vaginally twice a week        fluticasone 50 MCG/ACT nasal spray    FLONASE    16 g    Spray 2 sprays into both nostrils daily    Chronic rhinitis, unspecified type       gabapentin 100 MG capsule    NEURONTIN     TAKE 1 CAPSULE BY MOUTH EVERYDAY AT BEDTIME        * levothyroxine 100 MCG tablet    SYNTHROID/LEVOTHROID    90 tablet    Take 1 tablet (100 mcg) by mouth daily    Acquired hypothyroidism       * SYNTHROID 88 MCG tablet   Generic drug:  levothyroxine     90 tablet    Take 1 tablet (88 mcg) by mouth daily    Acquired hypothyroidism       simvastatin 10 MG tablet    ZOCOR    90 tablet    Take 1 tablet (10 mg) by mouth At Bedtime    Hyperlipidemia LDL goal <160       SYSTANE BALANCE 0.6 % Soln ophthalmic solution   Generic drug:  propylene glycol      1 drop        venlafaxine 75 MG 24  hr capsule    EFFEXOR-XR    90 capsule    Take 1 capsule (75 mg) by mouth daily Take 1 capsule daily    Anxiety, Major depressive disorder, single episode, moderate (H)       * Notice:  This list has 2 medication(s) that are the same as other medications prescribed for you. Read the directions carefully, and ask your doctor or other care provider to review them with you.

## 2018-12-06 NOTE — PROGRESS NOTES
Pt is completing a home sleep test. Pt was instructed on how to put on the Noxturnal T3 device and associated equipment before going to bed and given the opportunity to practice putting it on before leaving the sleep center. Pt was reminded to bring the home sleep test kit back to the center tomorrow, at agreed upon time for download and reporting.     Belinda Esquivel

## 2018-12-07 ENCOUNTER — DOCUMENTATION ONLY (OUTPATIENT)
Dept: SLEEP MEDICINE | Facility: CLINIC | Age: 58
End: 2018-12-07
Payer: COMMERCIAL

## 2018-12-10 NOTE — PROGRESS NOTES
This HSAT was performed using a Noxturnal T3 device which recorded snore, sound, movement activity, body position, nasal pressure, oronasal thermal airflow, pulse, oximetry and both chest and abdominal respiratory effort. HSAT data was restricted to the time patient states they were in bed.     HSAT was scored using 1B 4% hypopnea rule.     HST AHI (Non-PAT): 30.6  Snoring was reported as moderate and loud.  Time with SpO2 below 89% was 13.2 minutes.   Overall signal quality was good     Pt will follow up with sleep provider to determine appropriate therapy.

## 2018-12-12 ENCOUNTER — TELEPHONE (OUTPATIENT)
Dept: FAMILY MEDICINE | Facility: CLINIC | Age: 58
End: 2018-12-12

## 2018-12-12 DIAGNOSIS — E06.3 HASHIMOTO'S THYROIDITIS: Primary | ICD-10-CM

## 2018-12-12 PROBLEM — G47.33 OSA (OBSTRUCTIVE SLEEP APNEA): Status: ACTIVE | Noted: 2018-12-12

## 2018-12-12 NOTE — PROCEDURES
"HOME SLEEP STUDY INTERPRETATION    Patient: Allie Thao  MRN: [id number]  YOB: 1960  Study Date: 12/6/2018  Referring Provider: Donna Cheatham;   Ordering Provider: Kev Luevano MD     Indications for Home Study: Allie Thao is a 58 year old female with a history of Hashimoto's thyroiditis and depression who presents with symptoms suggestive of obstructive sleep apnea.    Estimated body mass index is 27.72 kg/m  as calculated from the following:    Height as of 11/16/18: 1.588 m (5' 2.5\").    Weight as of 11/16/18: 69.9 kg (154 lb).  Total score - [place]: 9 (11/16/2018  3:23 PM)  Total Score: 4 (11/16/2018  3:53 PM)    Data: A full night home sleep study was performed recording the standard physiologic parameters including body position, movement, sound, nasal pressure, thermal oral airflow, chest and abdominal movements with respiratory inductance plethysmography, and oxygen saturation by pulse oximetry. Pulse rate was estimated by oximetry recording. This study was considered adequate based on > 4 hours of quality oximetry and respiratory recording. As specified by the AASM Manual for the Scoring of Sleep and Associated events, version 2.3, Rule VIII.D 1B, 4% oxygen desaturation scoring for hypopneas is used as a standard of care on all home sleep apnea testing.    Analysis Time:  400.2 minutes    Respiration:   Sleep Associated Hypoxemia: sustained hypoxemia was present. Baseline oxygen saturation was 92.7%.  Time with saturation less than or equal to 88% was 13.2 minutes. The lowest oxygen saturation was 76%.   Snoring: Snoring was present.  Respiratory events: The home study revealed a presence of 107 obstructive apneas and 8 mixed and central apneas. There were 89 hypopneas resulting in a combined apnea/hypopnea index [AHI] of 30.6 events per hour.  AHI was 33.2 per hour supine, - per hour prone, 59 per hour on left side, and 2.1 per hour on right side.   Pattern: Excluding events " noted above, respiratory rate and pattern was Normal.    Position: Percent of time spent: supine - 77.1%, prone - 0%, on left - 7.9%, on right - 14.1%.    Heart Rate: By pulse oximetry normal rate was noted.     Assessment:   Severe obstructive sleep apnea.  Sleep associated hypoxemia was present.    Recommendations:  Consider auto-CPAP at 6-15 cmH2O, oral appliance therapy, polysomnography with full night PAP titration or surgical options.  Suggest optimizing sleep hygiene and avoiding sleep deprivation.  Weight management.    Diagnosis Code(s): Obstructive Sleep Apnea G47.33, Hypoxemia G47.36    Kev Luevano MD, December 12, 2018   Diplomate, American Board of Internal Medicine, Sleep Medicine

## 2018-12-12 NOTE — TELEPHONE ENCOUNTER
Reason for Call: Request for an order or referral:    Order or referral being requested: Patient was told to come back and test her thyroid after adjusting her meds after 5 to 6 weeks    Date needed:  By next week    Has the patient been seen by the PCP for this problem? YES    Additional comments: Please call patient when orders are in chart    Phone number Patient can be reached at:  Cell number on file:    Telephone Information:   Mobile [phone]       Best Time:  anytime    Can we leave a detailed message on this number?  YES    Call taken on 12/12/2018 at 8:53 AM by Judi Gaona

## 2018-12-12 NOTE — TELEPHONE ENCOUNTER
Per last lab results- patient to come back in 6 weeks to recheck tsh- ordered and called patient -left voice mail of this.    Aylin CalderonRN BSN  [facility]  [phone]

## 2018-12-14 ENCOUNTER — VIRTUAL VISIT (OUTPATIENT)
Dept: SLEEP MEDICINE | Facility: CLINIC | Age: 58
End: 2018-12-14
Payer: COMMERCIAL

## 2018-12-14 DIAGNOSIS — G47.33 OSA (OBSTRUCTIVE SLEEP APNEA): ICD-10-CM

## 2018-12-14 PROCEDURE — 99441 ZZC PHYSICIAN TELEPHONE EVALUATION 5-10 MIN: CPT | Performed by: INTERNAL MEDICINE

## 2018-12-14 NOTE — PROGRESS NOTES
"Allie Thao is a 58 year old female who is being evaluated via a billable telephone visit.      The patient has been notified of following:     \"This telephone visit will be conducted via a call between you and your physician/provider. We have found that certain health care needs can be provided without the need for a physical exam.  This service lets us provide the care you need with a short phone conversation.  If a prescription is necessary we can send it directly to your pharmacy.  If lab work is needed we can place an order for that and you can then stop by our lab to have the test done at a later time.    If during the course of the call the physician/provider feels a telephone visit is not appropriate, you will not be charged for this service.\"     Consent has been obtained for this service by 1 care team member: yes. See the scanned image in the medical record.    Allie Thao complains of  No chief complaint on file.      I have reviewed and updated the patient's Past Medical History, Social History, Family History and Medication List.    ALLERGIES  Cats and Ragweeds    - (MA signature)    Additional provider notes:   Home Sleep Apnea Testing Review  Slept well.  Analysis Time:  400.2 minutes     Respiration:   Sleep Associated Hypoxemia: sustained hypoxemia was present. Baseline oxygen saturation was 92.7%.  Time with saturation less than or equal to 88% was 13.2 minutes. The lowest oxygen saturation was 76%.   Snoring: Snoring was present.  Respiratory events: The home study revealed a presence of 107 obstructive apneas and 8 mixed and central apneas. There were 89 hypopneas resulting in a combined apnea/hypopnea index [AHI] of 30.6 events per hour.  AHI was 33.2 per hour supine, - per hour prone, 59 per hour on left side, and 2.1 per hour on right side.   Pattern: Excluding events noted above, respiratory rate and pattern was Normal.     Position: Percent of time spent: supine - 77.1%, prone - " 0%, on left - 7.9%, on right - 14.1%.     Heart Rate: By pulse oximetry normal rate was noted.     Assessment/Plan:  1. ANIRUDH (obstructive sleep apnea)  I reviewed the diagnosis of obstructive sleep apnea with patient.  We discussed health consequences of untreated sleep apnea and benefits of treatment.  She is interested in starting treatment of ANIRUDH with PAP therapy.  Reviewed importance of nightly therapy for effective treatment of ANIRUDH, and she voiced understanding and agreement.  We also reviewed importance of using PAP during the entire sleep duration to achieve maximal benefits, but reviewed that a minimum of 4 hours per night was required.  She is confident that she can achieve these goals and is willing to start therapy as soon as possible.    - Comprehensive DME     I have reviewed the note as documented above.  This accurately captures the substance of my conversation with the patient - Allie Thao.    Total time of call between patient and provider was 8 minutes     Kev Luevano MD

## 2018-12-18 ENCOUNTER — TELEPHONE (OUTPATIENT)
Dept: SLEEP MEDICINE | Facility: CLINIC | Age: 58
End: 2018-12-18

## 2018-12-18 DIAGNOSIS — G47.33 OSA (OBSTRUCTIVE SLEEP APNEA): ICD-10-CM

## 2018-12-27 DIAGNOSIS — E06.3 HASHIMOTO'S THYROIDITIS: ICD-10-CM

## 2018-12-27 PROCEDURE — 36415 COLL VENOUS BLD VENIPUNCTURE: CPT | Performed by: FAMILY MEDICINE

## 2018-12-27 PROCEDURE — 84443 ASSAY THYROID STIM HORMONE: CPT | Performed by: FAMILY MEDICINE

## 2018-12-28 LAB — TSH SERPL DL<=0.005 MIU/L-ACNC: 0.86 MU/L (ref 0.4–4)

## 2018-12-31 ENCOUNTER — DOCUMENTATION ONLY (OUTPATIENT)
Dept: SLEEP MEDICINE | Facility: CLINIC | Age: 58
End: 2018-12-31

## 2018-12-31 DIAGNOSIS — G47.33 OSA (OBSTRUCTIVE SLEEP APNEA): ICD-10-CM

## 2018-12-31 NOTE — PROGRESS NOTES
Patient was offered choice of vendor and chose [facility].  Patient Allie Thao was set up at [place] on December 31, 2018. Patient received a Resmed AirSense 10 Auto. Pressures were set at 6-15 cm H2O.   Patient s ramp is 6 cm H2O for Auto and FLEX/EPR is EPR, 2.  Patient received a Resmed Mask name: AIRFIT P10  Pillow mask size Small, heated tubing and heated humidifier.  Patient is enrolled in the STM Program and does not need to meet compliance. Patient has a follow up on 2/7/2019 with Dr. Luevano.    Aaliyah VARGAS      Physical Exam

## 2019-01-03 ENCOUNTER — DOCUMENTATION ONLY (OUTPATIENT)
Dept: SLEEP MEDICINE | Facility: CLINIC | Age: 59
End: 2019-01-03
Payer: COMMERCIAL

## 2019-01-03 NOTE — PROGRESS NOTES
3 DAY STM VISIT    Diagnostic AHI:    HST AHI (Non-PAT): 30.6  HST    Patient contacted for 3 day STM visit  Message left for patient to return call     Device type:   PAP Device: Auto-CPAP     PAP settings from order::  CPAP min 6         CPAP max 15     Mask type:      Mask Interface: Nasal Mask       Device settings from machine      Min CPAP   EPAP Min Auto CPAP: 6.0 (The minimum allowable pressure in cmH2O)              Max CPAP   EPAP Max Auto CPAP: 15.0 (The maximum allowable pressure in cmH2O)           Assessment: Patient has two nights of usage.   Action plan: Pt to have f/u 14 day STM visit.  Patient has a follow up visit scheduled:   yes within 31-90 days of set up.

## 2019-01-07 ENCOUNTER — TELEPHONE (OUTPATIENT)
Dept: SLEEP MEDICINE | Facility: CLINIC | Age: 59
End: 2019-01-07

## 2019-01-07 NOTE — TELEPHONE ENCOUNTER
Left voice mail for patient to reschedule her appointment with Dr. Luevano that was scheduled on 02/07/19 in [place].

## 2019-01-15 ENCOUNTER — DOCUMENTATION ONLY (OUTPATIENT)
Dept: SLEEP MEDICINE | Facility: CLINIC | Age: 59
End: 2019-01-15

## 2019-01-15 DIAGNOSIS — G47.33 OSA (OBSTRUCTIVE SLEEP APNEA): ICD-10-CM

## 2019-01-15 NOTE — PROGRESS NOTES
14  DAY STM VISIT    Diagnostic AHI:  30.6 HST    Message left for patient to return call     Assessment: Pt not meeting objective benchmarks for AHI and compliance       Action plan: waiting for patient to return call.  and pt to have 30 day STM visit. Pt may benefit from pressure change if ok with it      Device type: Auto-CPAP    PAP settings: CPAP min 6.0 cm  H20       CPAP max 15.0 cm  H20      95th% pressure 12.6 cm  H20     Mask type:  Nasal Mask    Objective measures: 14 day rolling measures      Compliance  50 %      Leak  25.66 lpm  last  upload      AHI 9.07   last  upload (mainly OA)      Average number of minutes 224      Objective measure goal  Compliance   Goal >70%  Leak   Goal < 24 lpm  AHI  Goal < 5  Usage  Goal >240

## 2019-01-31 ENCOUNTER — DOCUMENTATION ONLY (OUTPATIENT)
Dept: SLEEP MEDICINE | Facility: CLINIC | Age: 59
End: 2019-01-31

## 2019-01-31 DIAGNOSIS — G47.33 OSA (OBSTRUCTIVE SLEEP APNEA): ICD-10-CM

## 2019-01-31 NOTE — PROGRESS NOTES
30 DAY STM VISIT    Diagnostic AHI:   30.6 HST    Data only recheck unable to leave message at number listed.     Assessment: Pt not meeting objective benchmarks for AHI and compliance     Action plan:   Patient has scheduled a follow up visit with Bennett Goltz, PA on 2/8/2019.   Device type: Auto-CPAP  PAP settings: CPAP min 6.0 cm  H20     CPAP max 15.0 cm  H20        95th% pressure 13 cm  H20   Mask type:  Nasal Mask  Objective measures: 14 day rolling measures      Compliance  42 %      Leak  26.12 lpm  last  upload      AHI 9.65   last  upload      Average number of minutes 204      Objective measure goal  Compliance   Goal >70%  Leak   Goal < 24 lpm  AHI  Goal < 5  Usage  Goal >240

## 2019-02-06 ENCOUNTER — OFFICE VISIT (OUTPATIENT)
Dept: PSYCHOLOGY | Facility: CLINIC | Age: 59
End: 2019-02-06
Payer: COMMERCIAL

## 2019-02-06 DIAGNOSIS — F41.8 MIXED ANXIETY AND DEPRESSIVE DISORDER: Primary | ICD-10-CM

## 2019-02-06 PROCEDURE — 90834 PSYTX W PT 45 MINUTES: CPT | Performed by: PSYCHOLOGIST
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
## 2019-04-11 DIAGNOSIS — E03.9 ACQUIRED HYPOTHYROIDISM: ICD-10-CM

## 2019-04-12 RX ORDER — LEVOTHYROXINE SODIUM 88 MCG
TABLET ORAL
Qty: 90 TABLET | Refills: 0 | Status: SHIPPED | OUTPATIENT
Start: 2019-04-12 | End: 2019-07-24

## 2019-04-12 NOTE — TELEPHONE ENCOUNTER
Prescription approved per [facility] [place].    Dana MILLERN, RN   [facility]

## 2019-04-12 NOTE — TELEPHONE ENCOUNTER
"Requested Prescriptions   Pending Prescriptions Disp Refills     SYNTHROID 88 MCG tablet [Pharmacy Med Name: SYNTHROID TABS 88MCG] 90 tablet 1     Sig: TAKE 1 TABLET DAILY (CANCEL 100 MCG)  Last Written Prescription Date:  10/17/18  Last Fill Quantity: 90,  # refills: 1   Last office visit: 10/17/2018 with prescribing provider:  Lind[place]   Future Office Visit:   Next 5 appointments (look out 90 days)    May 09, 2019  2:00 PM CDT  [place] with Leila Mendenhall LP  [facility] [place] [address] [place] [id number]  [phone]   May 23, 2019  2:00 PM CDT  Return Visit with Leila Mendenhall LP  [facility] [place] [address] [place] [id number]  [phone]   Jun 06, 2019  2:00 PM CDT  Return Visit with Leila Mendenhall LP  [facility] [place] [address] [place] [id number]  [phone]                Thyroid Protocol Passed - 4/11/2019 10:39 PM        Passed - Patient is 12 years or older        Passed - Recent (12 mo) or future (30 days) visit within the authorizing provider's specialty     Patient had office visit in the last 12 months or has a visit in the next 30 days with authorizing provider or within the authorizing provider's specialty.  See \"Patient Info\" tab in inbasket, or \"Choose Columns\" in Meds & Orders section of the refill encounter.              Passed - Medication is active on med list        Passed - Normal TSH on file in past 12 months     Recent Labs   Lab Test 12/27/18  1501   TSH 0.86              Passed - No active pregnancy on record     If patient is pregnant or has had a positive pregnancy test, please check TSH.          Passed - No positive pregnancy test in past 12 months     If patient is pregnant or has had a positive pregnancy test, please check TSH.            "

## 2019-04-16 ENCOUNTER — OFFICE VISIT (OUTPATIENT)
Dept: PSYCHOLOGY | Facility: CLINIC | Age: 59
End: 2019-04-16
Payer: COMMERCIAL

## 2019-04-16 DIAGNOSIS — F41.8 MIXED ANXIETY AND DEPRESSIVE DISORDER: Primary | ICD-10-CM

## 2019-04-16 PROCEDURE — 90834 PSYTX W PT 45 MINUTES: CPT | Performed by: PSYCHOLOGIST

## 2019-04-16 NOTE — PROGRESS NOTES
Progress Note    Client Name: Allie Thao  Date: 4/16/19         Service Type: Individual  Video Visit: No     Session Start Time: 10:30  Session End Time: 11:30     Session Length: 60    Session #: 11    Attendees: Client attended alone     Treatment Plan Last Reviewed: 5/22/18  PHQ-9 / REGINA-7 Last Reviewed: 10/17/18  CGI: 4/16/19    DATA  Interactive Complexity: No  Crisis: No       Progress Since Last Session (Related to Symptoms / Goals / Homework):   Symptoms: Anxiety--increased    Homework: Some progress      Episode of Care Goals: Satisfactory progress - ACTION (Actively working towards change); Intervened by reinforcing change plan / affirming steps taken     Current / Ongoing Stressors and Concerns:   Work stressors   Interpersonal stressors     Treatment Objective(s) Addressed in This Session:   Identify and use strategies (>2) to more effectively address stressors at work       Intervention:   Client reports continued significant feelings of anxiety about her job security. She believes it is likely that she will be let go, and she routinely notices evidence that supports this suspicion. She is spending quite a lot of time mind reading and making interpretations about other people's actions. She recognizes that anxiety is impacting her ability to concentrate and work efficiently and that work stress is impacting her life outside of work as well. Challenged Client to generate other potential explanations for the data she has gathered, and then acknowledged that we really don't know which explanation--if any of the above--is accurate. Highlighted that the one aspect Client has control over is her work. If she can focus there, on doing her job well and taking satisfaction in providing service to all of those she works with, she will likely feel much better than if she is worried about things she doesn't have control over. She may also feel less vulnerable if she  knows she is doing good work. Client has already considered and decided that she doesn't want to look for another job, and she has developed a contingency plan in case she is let go, so I encouraged her to now keep her thoughts in the present and centered on doing her work in a way that feels satisfying and manageable. Discussed how she can reset her mindset to this goal as needed.  Client was open to this input.         ASSESSMENT: Current Emotional / Mental Status (status of significant symptoms):   Risk status (Self / Other harm or suicidal ideation)   Client denies current fears or concerns for personal safety.   Client denies current or recent suicidal ideation or behaviors.   Client denies current or recent homicidal ideation or behaviors.   Client denies current or recent self injurious behavior or ideation.   Client denies other safety concerns.   Client reports there has been no change in risk factors since her last session.     Client reports there has been no change in protective factors since her last session.     A safety and risk management plan has not been developed at this time, however client was given the after-hours number / 911 should there be a change in any of these risk factors.     Appearance:   Appropriate    Eye Contact:   Good    Psychomotor Behavior: Normal    Attitude:   Cooperative    Orientation:   All   Speech    Rate / Production: Normal     Volume:  Normal    Mood:    Anxious    Affect:    Appropriate , expressive   Thought Content:  Repetitive worries, frequent mind reading    Thought Form:  Coherent  Logical    Insight:    Fair      Medication Review:   No changes to current psychiatric medication(s)     Medication Compliance:   Yes     Changes in Health Issues:   None reported     Chemical Use Review:   Substance Use: Chemical use reviewed, no active concerns identified ; Client reports increased wine consumption to help with relaxation. She denies concern currently; we will  continue to monitor     Tobacco Use: No current tobacco use.      Diagnosis:  1. Mixed anxiety and depressive disorder        Collateral Reports Completed:   CGI    PLAN: (Client Tasks / Therapist Tasks / Other)  As Client faces uncertainty at work, she will direct her focus to the aspect she can control--doing her job well--rather than worrying about aspects she doesn't have control over. Her goal is to keep her thoughts in the present and centered on doing her work in a way that feels satisfying to her. She has prepared a contingency plan in case she is laid off. Continue with broad work of helping Client to manage her own feelings and actions, rather than trying to regulate by controlling others. Working toward acceptance of and decreased distress about factors that are beyond her control. Will continue to monitor alcohol intake as well as progress with modifying habits of road rage. Return appointment scheduled.        Leila Mendenhall LP                                                       ______________________________________________________________________    Treatment Plan    Client's Name: Allie Thao  YOB: 1960    Date: 5/22/18     DSM-V Diagnoses: 300.09 (F41.8) Other Specified Anxiety Disorder , Mixed Anxiety and Depressive Disorder  Psychosocial / Contextual Factors: work stressors, interpersonal stressors  WHODAS: 22     Referral / Collaboration:  Referral to another professional/service is not indicated at this time.     Anticipated number of session or this episode of care: re-evaluate every 90 days        MeasurableTreatment Goal(s) related to diagnosis / functional impairment(s)  Goal 1: Client will identify >2 effective strategies for managing stressors at work.    I will know I've met my goal when I know how to handle situations where I feel like I'm being discriminated against because of my age and gender.       Objective #A (Client Action)                Client will use  cognitive strategies identified in therapy to challenge anxious/unhelpful thoughts.  Status: New - Date: 5/22/18      Intervention(s)  Therapist will teach CBT strategies.     Objective #B  Client will use at least 2 coping skills for anxiety management in the next 8 weeks.  Status: New - Date: 5/22/18      Intervention(s)  Therapist will teach self-regulation, mindfulness, CBT skills.     Goal 2: Client will improve relationship with fiance.    I will know I've met my goal when I'm more comfortable with physical intimacy.       Objective #A (Client Action)                Status: New - Date: 5/22/18      Client will identify barriers to positive body image.     Intervention(s)  Therapist will provide psychoeducation, support.     Objective #B  Client will learn and use >2 effective interpersonal communication strategies.                      Status: New - Date: 5/22/18      Intervention(s)  Therapist will teach assertive communication, speaker/listener technique.      Client has reviewed and agreed to the above plan.      Leila Mendenhall, [facility]  April 16, 2019

## 2019-05-09 ENCOUNTER — MYC REFILL (OUTPATIENT)
Dept: FAMILY MEDICINE | Facility: CLINIC | Age: 59
End: 2019-05-09

## 2019-05-09 ENCOUNTER — OFFICE VISIT (OUTPATIENT)
Dept: PSYCHOLOGY | Facility: CLINIC | Age: 59
End: 2019-05-09
Payer: COMMERCIAL

## 2019-05-09 DIAGNOSIS — F41.9 ANXIETY: ICD-10-CM

## 2019-05-09 DIAGNOSIS — F32.1 MAJOR DEPRESSIVE DISORDER, SINGLE EPISODE, MODERATE (H): ICD-10-CM

## 2019-05-09 DIAGNOSIS — F41.8 MIXED ANXIETY AND DEPRESSIVE DISORDER: Primary | ICD-10-CM

## 2019-05-09 PROCEDURE — 90834 PSYTX W PT 45 MINUTES: CPT | Performed by: PSYCHOLOGIST

## 2019-05-09 RX ORDER — BUPROPION HYDROCHLORIDE 300 MG/1
300 TABLET ORAL DAILY
Qty: 90 TABLET | Refills: 1 | Status: CANCELLED | OUTPATIENT
Start: 2019-05-09

## 2019-05-09 RX ORDER — VENLAFAXINE HYDROCHLORIDE 75 MG/1
75 CAPSULE, EXTENDED RELEASE ORAL DAILY
Qty: 90 CAPSULE | Refills: 1 | Status: CANCELLED | OUTPATIENT
Start: 2019-05-09

## 2019-05-09 NOTE — PROGRESS NOTES
"                                           Progress Note    Client Name: Allie Thao  Date: 5/9/19         Service Type: Individual  Video Visit: No     Session Start Time: 14:00  Session End Time: 14:50     Session Length: 50    Session #: 12    Attendees: Client attended alone     Treatment Plan Last Reviewed: 5/22/18  PHQ-9 / REGINA-7 Last Reviewed: 10/17/18  CGI: 4/16/19    DATA  Interactive Complexity: No  Crisis: No       Progress Since Last Session (Related to Symptoms / Goals / Homework):   Symptoms: Anxiety    Homework: In progress      Episode of Care Goals: Satisfactory progress - ACTION (Actively working towards change); Intervened by reinforcing change plan / affirming steps taken     Current / Ongoing Stressors and Concerns:   Work stressors   Interpersonal stressors     Treatment Objective(s) Addressed in This Session:   Identify and use strategies (>2) to more effectively address stressors at work       Intervention:   Continued work on coping with anxiety around work stressors. Client stated that she had forgotten about her goal to focus on doing her job well and taking satisfaction in the strong working relationship she has (aspects she has control over). Discussed a visual cue she can use to redirect herself (will print complimentary emails she has received and keep near her computer). Again, talked about avoiding comparisons with others; she doesn't have to be like anyone else. Client reported that she is also struggling with an interpersonal issue, wanting to encourage a loved one to make healthy changes but not \"nag\" or create tension in the relationship. Talked about the typical human response to resist perceived pressure from others to make a change that one is not ready to make. Once again reviewed the limits of Client's control. Suggested that when she feels concerned about her loved one's choices and has the impulse to tell him what to do, she can redirect her attention to herself and " what she can do to take care of herself in that situation (of feeling concerned about something that is beyond her control to change). She can make healthy choices for herself, but she cannot compel anyone else to make changes. When assessing her own life balance currently, she identifies a need for increased physical activity, decreased time in front of her computer.        ASSESSMENT: Current Emotional / Mental Status (status of significant symptoms):   Risk status (Self / Other harm or suicidal ideation)   Client denies current fears or concerns for personal safety.   Client denies current or recent suicidal ideation or behaviors.   Client denies current or recent homicidal ideation or behaviors.   Client denies current or recent self injurious behavior or ideation.   Client denies other safety concerns.   Client reports there has been no change in risk factors since her last session.     Client reports there has been no change in protective factors since her last session.     A safety and risk management plan has not been developed at this time, however client was given the after-hours number / 911 should there be a change in any of these risk factors.     Appearance:   Appropriate    Eye Contact:   Good    Psychomotor Behavior: Normal    Attitude:   Open    Orientation:   All   Speech    Rate / Production: Normal     Volume:  Normal    Mood:    Anxious    Affect:    Appropriate    Thought Content:  Themes of insecurity and unworthiness    Thought Form:  Coherent  Logical    Insight:    Fair      Medication Review:   No changes to current psychiatric medication(s)     Medication Compliance:   Yes     Changes in Health Issues:   None reported     Chemical Use Review:   Substance Use: Chemical use reviewed, no active concerns identified ; Client reports regular wine consumption to help with relaxation. She denies concern currently; we will continue to monitor     Tobacco Use: No current tobacco use.       Diagnosis:  1. Mixed anxiety and depressive disorder        Collateral Reports Completed:   Not Applicable    PLAN: (Client Tasks / Therapist Tasks / Other)  Client will use visual cue to remind herself that her goal is to focus on doing her job well and finding satisfaction in the relationships that she has developed (aspects she can control). As she notices an impulse to comment on a loved one's lifestyle choices, she will redirect her attention to what she can do to take care of herself in that situation. To improve her own healthy choices, she would like to take a walk after work and spend less time in front of her computer. Continue with broad work of helping Client to manage her own feelings and actions, rather than trying to regulate by controlling others.  Will continue to monitor alcohol intake as well as progress with modifying habits of road rage. Return appointment scheduled.        Leila Mendenhall, [place]                                                       ______________________________________________________________________    Treatment Plan    Client's Name: Allie Thao  YOB: 1960    Date: 5/22/18     DSM-V Diagnoses: 300.09 (F41.8) Other Specified Anxiety Disorder , Mixed Anxiety and Depressive Disorder  Psychosocial / Contextual Factors: work stressors, interpersonal stressors  WHODAS: 22     Referral / Collaboration:  Referral to another professional/service is not indicated at this time.     Anticipated number of session or this episode of care: re-evaluate every 90 days        MeasurableTreatment Goal(s) related to diagnosis / functional impairment(s)  Goal 1: Client will identify >2 effective strategies for managing stressors at work.    I will know I've met my goal when I know how to handle situations where I feel like I'm being discriminated against because of my age and gender.       Objective #A (Client Action)                Client will use cognitive strategies  identified in therapy to challenge anxious/unhelpful thoughts.  Status: New - Date: 5/22/18      Intervention(s)  Therapist will teach CBT strategies.     Objective #B  Client will use at least 2 coping skills for anxiety management in the next 8 weeks.  Status: New - Date: 5/22/18      Intervention(s)  Therapist will teach self-regulation, mindfulness, CBT skills.     Goal 2: Client will improve relationship with fiance.    I will know I've met my goal when I'm more comfortable with physical intimacy.       Objective #A (Client Action)                Status: New - Date: 5/22/18      Client will identify barriers to positive body image.     Intervention(s)  Therapist will provide psychoeducation, support.     Objective #B  Client will learn and use >2 effective interpersonal communication strategies.                      Status: New - Date: 5/22/18      Intervention(s)  Therapist will teach assertive communication, speaker/listener technique.      Client has reviewed and agreed to the above plan.      Leila Mendenhall, [place]  April 16, 2019

## 2019-05-10 NOTE — TELEPHONE ENCOUNTER
"Requested Prescriptions   Pending Prescriptions Disp Refills     venlafaxine (EFFEXOR-XR) 75 MG 24 hr capsule [Pharmacy Med Name: VENLAFAXINE HCL ER CAPS 75MG] 90 capsule 1     Sig: TAKE 1 CAPSULE DAILY       Serotonin-Norepinephrine Reuptake Inhibitors  Failed - 5/9/2019 10:53 PM        Failed - PHQ-9 score of less than 5 in past 6 months     PHQ-9 SCORE 3/27/2018 5/13/2018 10/17/2018   PHQ-9 Total Score - - -   PHQ-9 Total Score MyChart - 5 (Mild depression) -   PHQ-9 Total Score 7 5 2     REGINA-7 SCORE 3/27/2018 5/13/2018 10/17/2018   Total Score - - -   Total Score - 6 (mild anxiety) -   Total Score 4 6 5          Last Written Prescription Date:  10/17/2018  Last Fill Quantity: 90,  # refills: 1   Last office visit: 10/17/2018 with prescribing provider:  NAVI Cheatham  Future Office Visit:   Next 5 appointments (look out 90 days)    Jun 06, 2019  2:00 PM CDT  Return Visit with Leila Mendenhall LP  [facility] ([facility]) [address] [place] [id number]  [phone]   Jun 18, 2019  2:00 PM CDT  Return Visit with Leila Mendenhall LP  [facility] ([facility]) [address] [place] [phone]  [phone]   Jul 11, 2019  2:00 PM CDT  Return Visit with Leila Mendenhall LP  [facility] ([facility]) [address] [place] [address]  [phone]              Failed - Recent (6 mo) or future (30 days) visit within the authorizing provider's specialty     Patient had office visit in the last 6 months or has a visit in the next 30 days with authorizing provider or within the authorizing provider's specialty.  See \"Patient Info\" tab in inbasket, or \"Choose Columns\" in Meds & Orders section of the refill encounter.            Passed - Blood pressure under 140/90 in past 12 months     BP Readings from Last 3 Encounters:   11/16/18 134/88   10/17/18 122/70   03/27/18 112/74 " "                Passed - Medication is active on med list        Passed - Patient is age 18 or older        Passed - No active pregnancy on record        Passed - Normal serum creatinine on file in past 12 months     Recent Labs   Lab Test 10/17/18  0924   CR 0.85             Passed - No positive pregnancy test in past 12 months        buPROPion (WELLBUTRIN XL) 300 MG 24 hr tablet [Pharmacy Med Name: BUPROPION HCL XL TABS 300MG] 90 tablet 1     Sig: TAKE 1 TABLET DAILY       SSRIs Protocol Failed - 5/9/2019 10:53 PM        Failed - PHQ-9 score less than 5 in past 6 months     PHQ-9 SCORE 3/27/2018 5/13/2018 10/17/2018   PHQ-9 Total Score - - -   PHQ-9 Total Score MyChart - 5 (Mild depression) -   PHQ-9 Total Score 7 5 2     REGINA-7 SCORE 3/27/2018 5/13/2018 10/17/2018   Total Score - - -   Total Score - 6 (mild anxiety) -   Total Score 4 6 5          Last Written Prescription Date:  10/17/2018  Last Fill Quantity: 90,  # refills: 1   Last office visit: 10/17/2018 with prescribing provider:  NAVI Cheatham   Future Office Visit:   Next 5 appointments (look out 90 days)    Jun 06, 2019  2:00 PM CDT  Return Visit with Leila Mendenhall LP  [facility] ([facility]) [address] [place] [id number]  [phone]   Jun 18, 2019  2:00 PM CDT  Return Visit with Leila Mendenhall LP  [facility] ([facility]) [address] [place] [id number]  [phone]   Jul 11, 2019  2:00 PM CDT  Return Visit with Leila Mendenhall LP  [facility] ([facility]) [address] [place] [id number]  [phone]              Failed - Recent (6 mo) or future (30 days) visit within the authorizing provider's specialty     Patient had office visit in the last 6 months or has a visit in the next 30 days with authorizing provider or within the authorizing provider's specialty.  See \"Patient Info\" tab in " "inbasket, or \"Choose Columns\" in Meds & Orders section of the refill encounter.            Passed - Medication is Bupropion     If the medication is Bupropion (Wellbutrin), and the patient is taking for smoking cessation; OK to refill.          Passed - Medication is active on med list        Passed - Patient is age 18 or older        Passed - No active pregnancy on record        Passed - No positive pregnancy test in last 12 months          "

## 2019-05-10 NOTE — TELEPHONE ENCOUNTER
Have her fill out PHQ 9 and REGINA 7.    Donna Cheatham MD  [facility], [facility] [place]

## 2019-05-13 ENCOUNTER — TELEPHONE (OUTPATIENT)
Dept: FAMILY MEDICINE | Facility: CLINIC | Age: 59
End: 2019-05-13

## 2019-05-13 RX ORDER — VENLAFAXINE HYDROCHLORIDE 75 MG/1
CAPSULE, EXTENDED RELEASE ORAL
Qty: 90 CAPSULE | Refills: 0 | Status: SHIPPED | OUTPATIENT
Start: 2019-05-13 | End: 2019-05-13

## 2019-05-13 RX ORDER — BUPROPION HYDROCHLORIDE 300 MG/1
TABLET ORAL
Qty: 90 TABLET | Refills: 0 | Status: SHIPPED | OUTPATIENT
Start: 2019-05-13 | End: 2019-05-13

## 2019-05-13 NOTE — TELEPHONE ENCOUNTER
10:54 PM   Jorje Rowe,   Questionnaires have been completed.   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   thx for your help!   vandana       PHQ-9 SCORE 5/13/2018 10/17/2018 5/12/2019   PHQ-9 Total Score - - -   PHQ-9 Total Score MyChart 5 (Mild depression) - 0   PHQ-9 Total Score 5 2 0     REGINA-7 SCORE 5/13/2018 10/17/2018 5/12/2019   Total Score - - -   Total Score 6 (mild anxiety) - 7 (mild anxiety)   Total Score 6 5 7     Medication is being filled for 1 time refill only due to:  Patient needs to be seen because due for 6 month depression follow up appt/eVisit. My Chart reply sent to the patient. Soledad Mckenzie RN

## 2019-05-13 NOTE — TELEPHONE ENCOUNTER
Pt calling back and advised 10 pills of bupropion was sent to [facility] in [facility] by Soledad.  Pt states she is at the  EP pharmacy now and would like it sent there.  Advised the pharmacist could call over to [employer] and have the rx transferred.  Pt states the pharmacist told her it probably would be faster to drive to [employer] to get rx than have it transferred.  Pt states she does not feel good since she has missed 2 days worth of meds and does now has to drive in traffic to get to [employer].  Nurse started to explain on what she could do and the phone went dead (pt hung up on nurse).    Called pharmacy to see if pt had left and [facility] pharmacist states pt left already.    Brook NEWMAN RN  EP Triage

## 2019-05-17 ENCOUNTER — TELEPHONE (OUTPATIENT)
Dept: FAMILY MEDICINE | Facility: CLINIC | Age: 59
End: 2019-05-17

## 2019-05-17 NOTE — TELEPHONE ENCOUNTER
See My Chart messages. Patient did not refill temporary rx for venlafaxine because she was told that the prescription was already sent to mail order. Informed that patient that the pharmacy could do an override, but she states that she did not  because she got sick. See My Chart message below.      Hi! I have been sick since Tuesday, started throwing up after work and haven t been back to work, luckily I can work from home.  Headache, sweaty, lightheaded but keeping food down now -  bagels, broth &noodles, baked potatoes, drinking water, juice and decaf diet soda. Could this be from missing a few doses of venlafaxine or is something going around? Thx for your help! Aylin    States that she missed 4-5 doses. Missed doses starting Sunday. Took first dose, (75 mg) this morning. Asking if OK to restart at previous 75 mg dose?  Soledad Mckenzie RN

## 2019-05-17 NOTE — TELEPHONE ENCOUNTER
Okay to restart the previous dose.    Donna Cheatham MD  [facility], [facility] [place]

## 2019-06-04 ENCOUNTER — HOSPITAL ENCOUNTER (OUTPATIENT)
Dept: MAMMOGRAPHY | Facility: CLINIC | Age: 59
Discharge: HOME | End: 2019-06-04
Attending: FAMILY MEDICINE | Admitting: FAMILY MEDICINE
Payer: COMMERCIAL

## 2019-06-04 DIAGNOSIS — Z12.39 BREAST CANCER SCREENING: ICD-10-CM

## 2019-06-04 PROCEDURE — 77063 BREAST TOMOSYNTHESIS BI: CPT

## 2019-06-12 ENCOUNTER — DOCUMENTATION ONLY (OUTPATIENT)
Dept: SLEEP MEDICINE | Facility: CLINIC | Age: 59
End: 2019-06-12

## 2019-06-17 PROBLEM — J31.0 CHRONIC RHINITIS: Status: ACTIVE | Noted: 2017-10-09

## 2019-06-18 ENCOUNTER — OFFICE VISIT (OUTPATIENT)
Dept: PSYCHOLOGY | Facility: CLINIC | Age: 59
End: 2019-06-18
Payer: COMMERCIAL

## 2019-06-18 DIAGNOSIS — F41.8 MIXED ANXIETY AND DEPRESSIVE DISORDER: Primary | ICD-10-CM

## 2019-06-18 PROCEDURE — 90834 PSYTX W PT 45 MINUTES: CPT | Performed by: PSYCHOLOGIST

## 2019-06-18 NOTE — PROGRESS NOTES
Progress Note    Client Name: Allie Thao  Date: 6/18/19         Service Type: Individual  Video Visit: No     Session Start Time: 14:00  Session End Time: 14:50     Session Length: 50    Session #: 13    Attendees: Client attended alone     Treatment Plan Last Reviewed: 5/22/18  PHQ-9 / REGINA-7 Last Reviewed: 10/17/18  CGI: 4/16/19    DATA  Interactive Complexity: No  Crisis: No       Progress Since Last Session (Related to Symptoms / Goals / Homework):   Symptoms: Anxiety    Homework: In progress      Episode of Care Goals: Satisfactory progress - ACTION (Actively working towards change); Intervened by reinforcing change plan / affirming steps taken     Current / Ongoing Stressors and Concerns:   Work stressors   Interpersonal stressors     Treatment Objective(s) Addressed in This Session:   Identify and use strategies (>2) to more effectively address stressors at work       Intervention:   Session today focused on issues related to external vs. internal validation. Discussed the extent to which Client is reliant on external feedback and pointed out the ways that this affects her day-to-day moods. Client recognizes that this is true at work as well as in her personal life. Reviewed how it would look if she shifted to a pattern of more internal validation--i.e., not allowing someone else's words or actions to ruin her day. Noted that this involves accurately identifying which factors are/are not within her control. Client acknowledged that this will be a significant challenge for her to decide that she will remain in control of her experience, regardless of how someone else behaves, but she is willing to practice. She reported that she has been touched by a number of deaths recently (in extended family, family friends), and this has resulted in improved perspective about all that she has to be grateful for.        ASSESSMENT: Current Emotional / Mental Status (status of  significant symptoms):   Risk status (Self / Other harm or suicidal ideation)   Client denies current fears or concerns for personal safety.   Client denies current or recent suicidal ideation or behaviors.   Client denies current or recent homicidal ideation or behaviors.   Client denies current or recent self injurious behavior or ideation.   Client denies other safety concerns.   Client reports there has been no change in risk factors since her last session.     Client reports there has been no change in protective factors since her last session.     A safety and risk management plan has not been developed at this time, however client was given the after-hours number / 911 should there be a change in any of these risk factors.     Appearance:   Appropriate    Eye Contact:   Good    Psychomotor Behavior: Normal    Attitude:   Cooperative    Orientation:   All   Speech    Rate / Production: Normal     Volume:  Normal    Mood:    Anxious , periods of irritability & reactivity   Affect:    Appropriate    Thought Content:  Themes of insecurity and unworthiness    Thought Form:  Coherent  Logical    Insight:    Fair      Medication Review:   No changes to current psychiatric medication(s)     Medication Compliance:   Yes     Changes in Health Issues:   None reported     Chemical Use Review:   Substance Use: Chemical use reviewed, no active concerns identified ; Client reports regular wine consumption to help with relaxation. She denies concern currently; we will continue to monitor     Tobacco Use: No current tobacco use.      Diagnosis:  1. Mixed anxiety and depressive disorder        Collateral Reports Completed:   Not Applicable    PLAN: (Client Tasks / Therapist Tasks / Other)  Work on shifting away from patterns of relying on external validation, toward internal validation. Client will practice identifying which factors are/are not within her control, and will actively choose not to allow someone else's words or  actions to ruin her day--she is responsible for her own experience. Continue with broad work of helping Client to manage her own feelings and actions, rather than trying to regulate by controlling others.  Will continue to monitor alcohol intake as well as progress with modifying habits of road rage. Return appointment scheduled.        Leila BERYLAlyse Mendenhall, LP                                                       ______________________________________________________________________    Treatment Plan    Client's Name: Allie Thao  YOB: 1960    Date: 5/22/18     DSM-V Diagnoses: 300.09 (F41.8) Other Specified Anxiety Disorder , Mixed Anxiety and Depressive Disorder  Psychosocial / Contextual Factors: work stressors, interpersonal stressors  WHODAS: 22     Referral / Collaboration:  Referral to another professional/service is not indicated at this time.     Anticipated number of session or this episode of care: re-evaluate every 90 days        MeasurableTreatment Goal(s) related to diagnosis / functional impairment(s)  Goal 1: Client will identify >2 effective strategies for managing stressors at work.    I will know I've met my goal when I know how to handle situations where I feel like I'm being discriminated against because of my age and gender.       Objective #A (Client Action)                Client will use cognitive strategies identified in therapy to challenge anxious/unhelpful thoughts.  Status: New - Date: 5/22/18      Intervention(s)  Therapist will teach CBT strategies.     Objective #B  Client will use at least 2 coping skills for anxiety management in the next 8 weeks.  Status: New - Date: 5/22/18      Intervention(s)  Therapist will teach self-regulation, mindfulness, CBT skills.     Goal 2: Client will improve relationship with fiance.    I will know I've met my goal when I'm more comfortable with physical intimacy.       Objective #A (Client Action)                Status: New - Date:  5/22/18      Client will identify barriers to positive body image.     Intervention(s)  Therapist will provide psychoeducation, support.     Objective #B  Client will learn and use >2 effective interpersonal communication strategies.                      Status: New - Date: 5/22/18      Intervention(s)  Therapist will teach assertive communication, speaker/listener technique.      Client has reviewed and agreed to the above plan.      Leila Mendenhall, [facility]  April 16, 2019

## 2019-07-02 ENCOUNTER — DOCUMENTATION ONLY (OUTPATIENT)
Dept: SLEEP MEDICINE | Facility: CLINIC | Age: 59
End: 2019-07-02

## 2019-07-02 DIAGNOSIS — G47.33 OSA (OBSTRUCTIVE SLEEP APNEA): ICD-10-CM

## 2019-07-02 NOTE — PROGRESS NOTES
6 month [facility] Recheck Visit     Diagnostic AHI:   30.6  HST    Subjective measures:   Pt reports that she is having an issues with usage due to straps stretching out.  She got a new supplies recently.  She will start using again she just needs to get back in the habit of using again as it was working well for her.     Assessment: Pt not meeting objective benchmarks for compliance  Patient meeting subjective benchmarks.   Action plan:   pt to follow up per provider request.  She will reach out to virtual care coordinator with any questions or concerns.       Device type: Auto-CPAP  PAP settings: CPAP min 6.0 cm  H20     CPAP max 15.0 cm  H20                 Objective measure goal  Compliance   Goal >70%  Leak   Goal < 24 lpm  AHI  Goal < 5  Usage  Goal >240

## 2019-07-24 ENCOUNTER — MYC MEDICAL ADVICE (OUTPATIENT)
Dept: FAMILY MEDICINE | Facility: CLINIC | Age: 59
End: 2019-07-24

## 2019-07-24 DIAGNOSIS — E03.9 ACQUIRED HYPOTHYROIDISM: ICD-10-CM

## 2019-07-24 DIAGNOSIS — E78.5 HYPERLIPIDEMIA LDL GOAL <160: ICD-10-CM

## 2019-07-24 RX ORDER — LEVOTHYROXINE SODIUM 100 UG/1
100 TABLET ORAL DAILY
Qty: 90 TABLET | Refills: 3 | Status: CANCELLED | OUTPATIENT
Start: 2019-07-24

## 2019-07-24 RX ORDER — LEVOTHYROXINE SODIUM 88 MCG
TABLET ORAL
Qty: 7 TABLET | Refills: 0 | Status: SHIPPED | OUTPATIENT
Start: 2019-07-24 | End: 2019-07-30

## 2019-07-24 NOTE — TELEPHONE ENCOUNTER
"Requested Prescriptions   Pending Prescriptions Disp Refills     levothyroxine (SYNTHROID/LEVOTHROID) 100 MCG tablet  Last Written Prescription Date:  10/17/2018  Last Fill Quantity: 90 tablet,  # refills: 3   Last Office Visit: 10/17/2018    Donna Cheatham MD        Future Office Visit:    Next 5 appointments (look out 90 days)    Jul 25, 2019 11:00 AM CDT  Return Visit with Leila Mendenhall LP  [facility] [place] ([facility]) [address] [address]  [phone]          90 tablet 3     Sig: Take 1 tablet (100 mcg) by mouth daily       Thyroid Protocol Passed - 7/24/2019  1:12 PM        Passed - Patient is 12 years or older        Passed - Recent (12 mo) or future (30 days) visit within the authorizing provider's specialty     Patient had office visit in the last 12 months or has a visit in the next 30 days with authorizing provider or within the authorizing provider's specialty.  See \"Patient Info\" tab in inbasket, or \"Choose Columns\" in Meds & Orders section of the refill encounter.              Passed - Medication is active on med list        Passed - Normal TSH on file in past 12 months     Recent Labs   Lab Test 12/27/18  1501   TSH 0.86              Passed - No active pregnancy on record     If patient is pregnant or has had a positive pregnancy test, please check TSH.          Passed - No positive pregnancy test in past 12 months     If patient is pregnant or has had a positive pregnancy test, please check TSH.            "

## 2019-07-24 NOTE — TELEPHONE ENCOUNTER
Patient requesting 7 tablets to local pharmacy while she waits for larger quantity to come via mail order.   Loretta Chris RN   [facility] - Triage

## 2019-07-25 ENCOUNTER — OFFICE VISIT (OUTPATIENT)
Dept: PSYCHOLOGY | Facility: CLINIC | Age: 59
End: 2019-07-25
Payer: COMMERCIAL

## 2019-07-25 DIAGNOSIS — E03.9 ACQUIRED HYPOTHYROIDISM: ICD-10-CM

## 2019-07-25 DIAGNOSIS — F41.8 MIXED ANXIETY AND DEPRESSIVE DISORDER: Primary | ICD-10-CM

## 2019-07-25 PROCEDURE — 36415 COLL VENOUS BLD VENIPUNCTURE: CPT | Performed by: FAMILY MEDICINE

## 2019-07-25 PROCEDURE — 90834 PSYTX W PT 45 MINUTES: CPT | Performed by: PSYCHOLOGIST

## 2019-07-25 PROCEDURE — 84443 ASSAY THYROID STIM HORMONE: CPT | Performed by: FAMILY MEDICINE

## 2019-07-25 PROCEDURE — 84439 ASSAY OF FREE THYROXINE: CPT | Performed by: FAMILY MEDICINE

## 2019-07-25 ASSESSMENT — COLUMBIA-SUICIDE SEVERITY RATING SCALE - C-SSRS
4. HAVE YOU HAD THESE THOUGHTS AND HAD SOME INTENTION OF ACTING ON THEM?: NO
2. HAVE YOU ACTUALLY HAD ANY THOUGHTS OF KILLING YOURSELF LIFETIME?: YES
ATTEMPT LIFETIME: NO
1. IN THE PAST MONTH, HAVE YOU WISHED YOU WERE DEAD OR WISHED YOU COULD GO TO SLEEP AND NOT WAKE UP?: YES
TOTAL  NUMBER OF ABORTED OR SELF INTERRUPTED ATTEMPTS PAST 3 MONTHS: NO
5. HAVE YOU STARTED TO WORK OUT OR WORKED OUT THE DETAILS OF HOW TO KILL YOURSELF? DO YOU INTEND TO CARRY OUT THIS PLAN?: NO
4. HAVE YOU HAD THESE THOUGHTS AND HAD SOME INTENTION OF ACTING ON THEM?: NO
TOTAL  NUMBER OF ABORTED OR SELF INTERRUPTED ATTEMPTS PAST LIFETIME: NO
2. HAVE YOU ACTUALLY HAD ANY THOUGHTS OF KILLING YOURSELF?: NO
6. HAVE YOU EVER DONE ANYTHING, STARTED TO DO ANYTHING, OR PREPARED TO DO ANYTHING TO END YOUR LIFE?: NO
1. IN THE PAST MONTH, HAVE YOU WISHED YOU WERE DEAD OR WISHED YOU COULD GO TO SLEEP AND NOT WAKE UP?: NO
TOTAL  NUMBER OF INTERRUPTED ATTEMPTS PAST 3 MONTHS: NO
ATTEMPT PAST THREE MONTHS: NO
5. HAVE YOU STARTED TO WORK OUT OR WORKED OUT THE DETAILS OF HOW TO KILL YOURSELF? DO YOU INTEND TO CARRY OUT THIS PLAN?: NO
6. HAVE YOU EVER DONE ANYTHING, STARTED TO DO ANYTHING, OR PREPARED TO DO ANYTHING TO END YOUR LIFE?: NO
TOTAL  NUMBER OF INTERRUPTED ATTEMPTS LIFETIME: NO
3. HAVE YOU BEEN THINKING ABOUT HOW YOU MIGHT KILL YOURSELF?: YES

## 2019-07-25 NOTE — PROGRESS NOTES
"                                           Progress Note    Client Name: Allie Thao  Date: 7/25/19         Service Type: Individual  Video Visit: No     Session Start Time: 14:00  Session End Time: 14:50     Session Length: 50    Session #: 13    Attendees: Client attended alone     Treatment Plan Last Reviewed: 5/22/18  PHQ-9 / REGINA-7 Last Reviewed: 7/24/19  CGI: 4/16/19  CSSRS: 7/25/19    DATA  Interactive Complexity: No  Crisis: No       Progress Since Last Session (Related to Symptoms / Goals / Homework):   Symptoms: Anxiety    Homework: In progress      Episode of Care Goals: Satisfactory progress - ACTION (Actively working towards change); Intervened by reinforcing change plan / affirming steps taken     Current / Ongoing Stressors and Concerns:   Work stressors   Interpersonal stressors     Treatment Objective(s) Addressed in This Session:   Identify and use strategies (>2) to more effectively address stressors at work       Intervention:   Client shared from her recent performance review--consistently positive feedback. While she doubts the sincerity of some of the comments, overall she stated that it does provide some reassurance, and she stated that she is less worried about being let go. She continues to report strong motivation for performing her job well, and she is making progress with using her own standards to evaluate herself, rather than relying on feedback from the external environment.  Validated her efforts. Worked on interpersonal issues. Discussed Gottman's principle of negative vs. positive override in intimate relationships, including strategies to shift toward the positive. Also talked about the \"repair\" component of a conflict, and how Client and her partner might start working toward intentional repair after a negative interaction.        ASSESSMENT: Current Emotional / Mental Status (status of significant symptoms):   Risk status (Self / Other harm or suicidal ideation)   Client " denies current fears or concerns for personal safety.   Client denies current or recent suicidal ideation or behaviors.   Client denies current or recent homicidal ideation or behaviors.   Client denies current or recent self injurious behavior or ideation.   Client denies other safety concerns.   Client reports there has been no change in risk factors since her last session.     Client reports there has been no change in protective factors since her last session.     A safety and risk management plan has not been developed at this time, however client was given the after-hours number / 911 should there be a change in any of these risk factors.     Appearance:   Appropriate    Eye Contact:   Good    Psychomotor Behavior: Normal    Attitude:   Thoughtful, engaged    Orientation:   All   Speech    Rate / Production: Normal     Volume:  Normal    Mood:    Some improvement, less anxious ; periods of irritability & reactivity   Affect:    Appropriate    Thought Content:  Themes of insecurity and unworthiness    Thought Form:  Coherent  Logical    Insight:    Fair      Medication Review:   No changes to current psychiatric medication(s)     Medication Compliance:   Yes     Changes in Health Issues:   None reported     Chemical Use Review:   Substance Use: Chemical use reviewed, no active concerns identified ; Client reports regular wine consumption to help with relaxation. She denies concern currently; we will continue to monitor     Tobacco Use: No current tobacco use.      Diagnosis:  1. Mixed anxiety and depressive disorder        Collateral Reports Completed:   CSSRS    PLAN: (Client Tasks / Therapist Tasks / Other)  Will provide Client with article on negative vs. positive override in intimate relationships. She will make efforts toward intentional repair following a negative interaction. Support Client in shifting away from patterns of relying on external validation, toward internal validation. She continues to work  on actively choosing not to allow someone else's words or actions to ruin her day--she is responsible for her own experience. She will stay focused on her own motivations for performing well at work. Continue with broad work of helping Client to manage her own feelings and actions, rather than trying to regulate by controlling others. Will continue to monitor alcohol intake as well as progress with modifying habits of road rage. Return appointment scheduled.        Leila Mendenhall, LP                                                       ______________________________________________________________________    Treatment Plan    Client's Name: Allie Thao  YOB: 1960    Date: 5/22/18     DSM-V Diagnoses: 300.09 (F41.8) Other Specified Anxiety Disorder , Mixed Anxiety and Depressive Disorder  Psychosocial / Contextual Factors: work stressors, interpersonal stressors  WHODAS: 22     Referral / Collaboration:  Referral to another professional/service is not indicated at this time.     Anticipated number of session or this episode of care: re-evaluate every 90 days        MeasurableTreatment Goal(s) related to diagnosis / functional impairment(s)  Goal 1: Client will identify >2 effective strategies for managing stressors at work.    I will know I've met my goal when I know how to handle situations where I feel like I'm being discriminated against because of my age and gender.       Objective #A (Client Action)                Client will use cognitive strategies identified in therapy to challenge anxious/unhelpful thoughts.  Status: New - Date: 5/22/18      Intervention(s)  Therapist will teach CBT strategies.     Objective #B  Client will use at least 2 coping skills for anxiety management in the next 8 weeks.  Status: New - Date: 5/22/18      Intervention(s)  Therapist will teach self-regulation, mindfulness, CBT skills.     Goal 2: Client will improve relationship with fiance.    I will know I've met  my goal when I'm more comfortable with physical intimacy.       Objective #A (Client Action)                Status: New - Date: 5/22/18      Client will identify barriers to positive body image.     Intervention(s)  Therapist will provide psychoeducation, support.     Objective #B  Client will learn and use >2 effective interpersonal communication strategies.                      Status: New - Date: 5/22/18      Intervention(s)  Therapist will teach assertive communication, speaker/listener technique.      Client has reviewed and agreed to the above plan.      Leila Mendenhall, [place]  April 16, 2019

## 2019-07-26 LAB
T4 FREE SERPL-MCNC: 0.83 NG/DL (ref 0.76–1.46)
TSH SERPL DL<=0.005 MIU/L-ACNC: 5.4 MU/L (ref 0.4–4)

## 2019-07-30 ENCOUNTER — OFFICE VISIT (OUTPATIENT)
Dept: FAMILY MEDICINE | Facility: CLINIC | Age: 59
End: 2019-07-30
Payer: COMMERCIAL

## 2019-07-30 VITALS
WEIGHT: 152 LBS | SYSTOLIC BLOOD PRESSURE: 128 MMHG | HEART RATE: 72 BPM | TEMPERATURE: 98.5 F | HEIGHT: 63 IN | DIASTOLIC BLOOD PRESSURE: 70 MMHG | BODY MASS INDEX: 26.93 KG/M2

## 2019-07-30 DIAGNOSIS — F32.1 MAJOR DEPRESSIVE DISORDER, SINGLE EPISODE, MODERATE (H): ICD-10-CM

## 2019-07-30 DIAGNOSIS — F41.9 ANXIETY: ICD-10-CM

## 2019-07-30 PROCEDURE — 99213 OFFICE O/P EST LOW 20 MIN: CPT | Performed by: FAMILY MEDICINE

## 2019-07-30 RX ORDER — VENLAFAXINE HYDROCHLORIDE 75 MG/1
75 CAPSULE, EXTENDED RELEASE ORAL DAILY
Qty: 90 CAPSULE | Refills: 1 | Status: SHIPPED | OUTPATIENT
Start: 2019-07-30 | End: 2020-02-03

## 2019-07-30 RX ORDER — BUPROPION HYDROCHLORIDE 300 MG/1
300 TABLET ORAL DAILY
Qty: 90 TABLET | Refills: 1 | Status: SHIPPED | OUTPATIENT
Start: 2019-07-30 | End: 2020-02-03

## 2019-07-30 ASSESSMENT — ANXIETY QUESTIONNAIRES
IF YOU CHECKED OFF ANY PROBLEMS ON THIS QUESTIONNAIRE, HOW DIFFICULT HAVE THESE PROBLEMS MADE IT FOR YOU TO DO YOUR WORK, TAKE CARE OF THINGS AT HOME, OR GET ALONG WITH OTHER PEOPLE: NOT DIFFICULT AT ALL
6. BECOMING EASILY ANNOYED OR IRRITABLE: NOT AT ALL
2. NOT BEING ABLE TO STOP OR CONTROL WORRYING: SEVERAL DAYS
3. WORRYING TOO MUCH ABOUT DIFFERENT THINGS: SEVERAL DAYS
1. FEELING NERVOUS, ANXIOUS, OR ON EDGE: SEVERAL DAYS
5. BEING SO RESTLESS THAT IT IS HARD TO SIT STILL: NOT AT ALL
GAD7 TOTAL SCORE: 4
7. FEELING AFRAID AS IF SOMETHING AWFUL MIGHT HAPPEN: SEVERAL DAYS

## 2019-07-30 ASSESSMENT — PATIENT HEALTH QUESTIONNAIRE - PHQ9
SUM OF ALL RESPONSES TO PHQ QUESTIONS 1-9: 0
5. POOR APPETITE OR OVEREATING: NOT AT ALL

## 2019-07-30 ASSESSMENT — MIFFLIN-ST. JEOR: SCORE: 1225.66

## 2019-07-30 NOTE — PROGRESS NOTES
Subjective     Allie Thao is a 59 year old female who presents to clinic today for the following health issues:    HPI   Depression Followup    How are you doing with your depression since your last visit? Stable    Are you having other symptoms that might be associated with depression? No    Have you had a significant life event?  No     Are you feeling anxious or having panic attacks?   Yes:  anxious about work load     Do you have any concerns with your use of alcohol or other drugs? No    Social History     Tobacco Use     Smoking status: Former Smoker     Packs/day: 1.50     Years: 11.00     Pack years: 16.50     Types: Cigarettes     Last attempt to quit: 1988     Years since quittin.6     Smokeless tobacco: Never Used   Substance Use Topics     Alcohol use: Yes     Alcohol/week: 0.0 oz     Comment: 0-3 beers a week     Drug use: No     PHQ 2019   PHQ-9 Total Score 0 0 0   Q9: Thoughts of better off dead/self-harm past 2 weeks Not at all Not at all Not at all     REGINA-7 SCORE 2019   Total Score - - -   Total Score 7 (mild anxiety) - -   Total Score 7 8 4     No flowsheet data found.  No flowsheet data found.      Suicide Assessment Five-step Evaluation and Treatment (SAFE-T)    Amount of exercise or physical activity: None    Problems taking medications regularly: No    Medication side effects: none    Diet: regular (no restrictions)          Patient Active Problem List   Diagnosis     Osteoarthritis cervical spine     Allergic rhinitis     History of basal cell carcinoma     Idiopathic urticaria     Hashimoto's thyroiditis     HYPERLIPIDEMIA LDL GOAL <160     Moderate Depression [296.22]     Flat epithelial atypia of breast     Advanced directives, counseling/discussion     Acquired hypothyroidism     Chronic rhinitis, unspecified type     ANIRUDH (obstructive sleep apnea)     Past Surgical History:   Procedure Laterality Date     COLONOSCOPY  2010     4 mm polyps mid sigmoid and mid descending colon, repeat 5 years     DEXA  10/2010    T score lumbar 0.8, femoral neck -0.8/-0.2 with BMD 0.975     HC EXC MALIG SKIN LESION TRUNK/ARM/LEG <=0.5 CM  2005    excision basal cell right forearm     HC EXCISION BREAST LESION W XRAY MARKER, OPEN SINGLE  2010    Right breast excisional biopsy: Focal complex sclerosing lesion, focal columnar cell hyperplasia, adenosis with microcalcifications, no atypia     HC MRI CERVICAL SPINE W/O CONTRAST  10/2009    mulitilevel disc and facet joint denerative changes, mild spinal stenosis lower cervical levels, varying degrees of foraminal stenosis rt>lt (especially C4-5)     HC NCS MOTOR W/O F-WAVE, EACH NERVE  2009    mild left and very mild right median nerve neuropathy     HC REPAIR INTERMED, WOUND TRUNK/ARM/LEG 7.6-12 CM  1970s    complex closure right knee wound     MRA ANGIOGRAM BRAIN & MRI BRAIN W/O CONTRAST  2013    normal     SONO PELVIS COMPLETE  2013    2 small unterine myoma (12 and 14 mm) NOT approximating endometrium, 4  mm endoemtrial thickness       Social History     Tobacco Use     Smoking status: Former Smoker     Packs/day: 1.50     Years: 11.00     Pack years: 16.50     Types: Cigarettes     Last attempt to quit: 1988     Years since quittin.6     Smokeless tobacco: Never Used   Substance Use Topics     Alcohol use: Yes     Alcohol/week: 0.0 oz     Comment: 0-3 beers a week     Family History   Problem Relation Age of Onset     Lipids Mother      Cancer Mother         several basal cell skin cancers     Heart Disease Mother         atrial fibrillation/pacemaker, rheumatic heart disease - valve replacement     Gastrointestinal Disease Mother         GI bleeding from gastritis/diverticulitis     Alcohol/Drug Father      Respiratory Father         COPD,  66, smoker     Alzheimer Disease Maternal Grandmother         onset early 80s     Cancer - colorectal Maternal Grandfather          age  "80     Breast Cancer Maternal Aunt         onset early 60s     Blood Disease Sister         thrombophlebitis, negative factor testing     Connective Tissue Disorder Brother         bilateral hip replacement 40s for slipped capital epiphyses     Neurologic Disorder Other         nieces/nephews with Tourette's     Osteoarthritis Sister         hands         Current Outpatient Medications   Medication Sig Dispense Refill     buPROPion (WELLBUTRIN XL) 300 MG 24 hr tablet Take 1 tablet (300 mg) by mouth daily 90 tablet 1     Calcium-Vitamin D-Vitamin K 500-100-40 MG-UNT-MCG CHEW  90 tablet      estradiol (ESTRACE) 0.1 MG/GM cream Place 2 g vaginally twice a week       fexofenadine (ALLEGRA) 180 MG tablet Take 1 tablet by mouth daily. 90 tablet 3     gabapentin (NEURONTIN) 100 MG capsule TAKE 1 CAPSULE BY MOUTH EVERYDAY AT BEDTIME  3     Multiple Vitamin (DAILY MULTIVITAMIN PO) Take 1 tablet by mouth daily       simvastatin (ZOCOR) 10 MG tablet Take 1 tablet (10 mg) by mouth At Bedtime 90 tablet 3     SYNTHROID 100 MCG tablet Take 1 tablet (100 mcg) by mouth daily 90 tablet 1     venlafaxine (EFFEXOR-XR) 75 MG 24 hr capsule Take 1 capsule (75 mg) by mouth daily 90 capsule 1     Allergies   Allergen Reactions     Cats      Ragweeds          Reviewed and updated as needed this visit by Provider         Review of Systems   ROS COMP: CONSTITUTIONAL: NEGATIVE for fever, chills, change in weight  ENT/MOUTH: NEGATIVE for ear, mouth and throat problems  RESP: NEGATIVE for significant cough or SOB  CV: NEGATIVE for chest pain, palpitations or peripheral edema      Objective    /70   Pulse 72   Temp 98.5  F (36.9  C) (Tympanic)   Ht 1.588 m (5' 2.5\")   Wt 68.9 kg (152 lb)   LMP 08/18/2010   BMI 27.36 kg/m    Body mass index is 27.36 kg/m .  Physical Exam   GENERAL: healthy, alert and no distress  NECK: no adenopathy, no asymmetry, masses, or scars and thyroid normal to palpation  RESP: lungs clear to auscultation - no " "rales, rhonchi or wheezes  CV: regular rate and rhythm, normal S1 S2, no S3 or S4, no murmur, click or rub, no peripheral edema and peripheral pulses strong  PSYCH: mentation appears normal, affect normal/bright            Assessment & Plan     1. Major depressive disorder, single episode, moderate (H)  Recommending to resume current medication.  Symptoms are stable.  - buPROPion (WELLBUTRIN XL) 300 MG 24 hr tablet; Take 1 tablet (300 mg) by mouth daily  Dispense: 90 tablet; Refill: 1  - venlafaxine (EFFEXOR-XR) 75 MG 24 hr capsule; Take 1 capsule (75 mg) by mouth daily  Dispense: 90 capsule; Refill: 1    2. Anxiety  Recommending to resume current medications.  Symptoms are stable.  - venlafaxine (EFFEXOR-XR) 75 MG 24 hr capsule; Take 1 capsule (75 mg) by mouth daily  Dispense: 90 capsule; Refill: 1     BMI:   Estimated body mass index is 27.36 kg/m  as calculated from the following:    Height as of this encounter: 1.588 m (5' 2.5\").    Weight as of this encounter: 68.9 kg (152 lb).       Return in about 12 weeks (around 10/22/2019) for Annual Physical Exam.    Donna Cheatham MD  [facility]      "

## 2019-07-31 ASSESSMENT — ANXIETY QUESTIONNAIRES: GAD7 TOTAL SCORE: 4

## 2019-09-06 ENCOUNTER — MYC MEDICAL ADVICE (OUTPATIENT)
Dept: FAMILY MEDICINE | Facility: CLINIC | Age: 59
End: 2019-09-06

## 2019-09-06 NOTE — TELEPHONE ENCOUNTER
Please see Atari message and advise. Thank you very much.    Ermelinda Lind RN, BSN  [facility] [place]

## 2019-09-29 ENCOUNTER — HEALTH MAINTENANCE LETTER (OUTPATIENT)
Age: 59
End: 2019-09-29

## 2019-10-16 DIAGNOSIS — E78.5 HYPERLIPIDEMIA LDL GOAL <160: ICD-10-CM

## 2019-10-17 RX ORDER — SIMVASTATIN 10 MG
TABLET ORAL
Qty: 90 TABLET | Refills: 0 | Status: SHIPPED | OUTPATIENT
Start: 2019-10-17 | End: 2019-10-23

## 2019-10-17 NOTE — TELEPHONE ENCOUNTER
"Requested Prescriptions   Pending Prescriptions Disp Refills     simvastatin (ZOCOR) 10 MG tablet [Pharmacy Med Name: SIMVASTATIN TABS 10MG] 90 tablet 4     Sig: TAKE 1 TABLET AT BEDTIME       Statins Protocol Passed - 10/16/2019 10:25 PM        Passed - LDL on file in past 12 months     Recent Labs   Lab Test 10/17/18  0924   LDL 72             Passed - No abnormal creatine kinase in past 12 months     No lab results found.             Passed - Recent (12 mo) or future (30 days) visit within the authorizing provider's specialty     Patient has had an office visit with the authorizing provider or a provider within the authorizing providers department within the previous 12 mos or has a future within next 30 days. See \"Patient Info\" tab in inbasket, or \"Choose Columns\" in Meds & Orders section of the refill encounter.              Passed - Medication is active on med list        Passed - Patient is age 18 or older        Passed - No active pregnancy on record        Passed - No positive pregnancy test in past 12 months        simvastatin (ZOCOR) 10 MG tablet 90 tablet 3 10/17/2018       Last Written Prescription Date:  10/17/2018  Last Fill Quantity: 90,  # refills: 3   Last office visit: 7/30/2019 with prescribing provider:  Dr. Cheatham   Future Office Visit: 10/23/2019  Next 5 appointments (look out 90 days)    Oct 23, 2019  8:20 AM CDT  PHYSICAL with Donna Cheatham MD  [facility] ([address] [address]  [phone]           "

## 2019-10-23 ENCOUNTER — OFFICE VISIT (OUTPATIENT)
Dept: FAMILY MEDICINE | Facility: CLINIC | Age: 59
End: 2019-10-23
Payer: COMMERCIAL

## 2019-10-23 VITALS
HEIGHT: 62 IN | TEMPERATURE: 97.4 F | DIASTOLIC BLOOD PRESSURE: 78 MMHG | BODY MASS INDEX: 27.6 KG/M2 | OXYGEN SATURATION: 96 % | SYSTOLIC BLOOD PRESSURE: 122 MMHG | HEART RATE: 83 BPM | WEIGHT: 150 LBS

## 2019-10-23 DIAGNOSIS — E03.9 ACQUIRED HYPOTHYROIDISM: ICD-10-CM

## 2019-10-23 DIAGNOSIS — Z23 NEED FOR VACCINATION: ICD-10-CM

## 2019-10-23 DIAGNOSIS — F32.1 MAJOR DEPRESSIVE DISORDER, SINGLE EPISODE, MODERATE (H): ICD-10-CM

## 2019-10-23 DIAGNOSIS — E78.5 HYPERLIPIDEMIA LDL GOAL <160: ICD-10-CM

## 2019-10-23 DIAGNOSIS — Z00.00 ROUTINE GENERAL MEDICAL EXAMINATION AT A HEALTH CARE FACILITY: Primary | ICD-10-CM

## 2019-10-23 DIAGNOSIS — N95.1 HOT FLASH, MENOPAUSAL: ICD-10-CM

## 2019-10-23 LAB
ALBUMIN SERPL-MCNC: 3.9 G/DL (ref 3.4–5)
ALP SERPL-CCNC: 121 U/L (ref 40–150)
ALT SERPL W P-5'-P-CCNC: 24 U/L (ref 0–50)
ANION GAP SERPL CALCULATED.3IONS-SCNC: 6 MMOL/L (ref 3–14)
AST SERPL W P-5'-P-CCNC: 17 U/L (ref 0–45)
BILIRUB SERPL-MCNC: 0.5 MG/DL (ref 0.2–1.3)
BUN SERPL-MCNC: 18 MG/DL (ref 7–30)
CALCIUM SERPL-MCNC: 8.8 MG/DL (ref 8.5–10.1)
CHLORIDE SERPL-SCNC: 106 MMOL/L (ref 94–109)
CHOLEST SERPL-MCNC: 179 MG/DL
CO2 SERPL-SCNC: 28 MMOL/L (ref 20–32)
CREAT SERPL-MCNC: 0.75 MG/DL (ref 0.52–1.04)
GFR SERPL CREATININE-BSD FRML MDRD: 87 ML/MIN/{1.73_M2}
GLUCOSE SERPL-MCNC: 79 MG/DL (ref 70–99)
HDLC SERPL-MCNC: 57 MG/DL
LDLC SERPL CALC-MCNC: 92 MG/DL
NONHDLC SERPL-MCNC: 122 MG/DL
POTASSIUM SERPL-SCNC: 3.7 MMOL/L (ref 3.4–5.3)
PROT SERPL-MCNC: 7.5 G/DL (ref 6.8–8.8)
SODIUM SERPL-SCNC: 140 MMOL/L (ref 133–144)
TRIGL SERPL-MCNC: 152 MG/DL
TSH SERPL DL<=0.005 MIU/L-ACNC: 0.61 MU/L (ref 0.4–4)

## 2019-10-23 PROCEDURE — 84443 ASSAY THYROID STIM HORMONE: CPT | Performed by: FAMILY MEDICINE

## 2019-10-23 PROCEDURE — 90471 IMMUNIZATION ADMIN: CPT | Performed by: FAMILY MEDICINE

## 2019-10-23 PROCEDURE — 99213 OFFICE O/P EST LOW 20 MIN: CPT | Mod: 25 | Performed by: FAMILY MEDICINE

## 2019-10-23 PROCEDURE — 99396 PREV VISIT EST AGE 40-64: CPT | Mod: 25 | Performed by: FAMILY MEDICINE

## 2019-10-23 PROCEDURE — 80053 COMPREHEN METABOLIC PANEL: CPT | Performed by: FAMILY MEDICINE

## 2019-10-23 PROCEDURE — 90632 HEPA VACCINE ADULT IM: CPT | Performed by: FAMILY MEDICINE

## 2019-10-23 PROCEDURE — 36415 COLL VENOUS BLD VENIPUNCTURE: CPT | Performed by: FAMILY MEDICINE

## 2019-10-23 PROCEDURE — 80061 LIPID PANEL: CPT | Performed by: FAMILY MEDICINE

## 2019-10-23 RX ORDER — OMEGA-3 FATTY ACIDS/FISH OIL 300-1000MG
200 CAPSULE ORAL 2 TIMES DAILY
COMMUNITY
End: 2020-06-30

## 2019-10-23 RX ORDER — SIMVASTATIN 10 MG
10 TABLET ORAL AT BEDTIME
Qty: 90 TABLET | Refills: 3 | Status: SHIPPED | OUTPATIENT
Start: 2019-10-23 | End: 2020-01-03

## 2019-10-23 RX ORDER — LEVOTHYROXINE SODIUM 100 MCG
100 TABLET ORAL DAILY
Qty: 90 TABLET | Refills: 3 | Status: SHIPPED | OUTPATIENT
Start: 2019-10-23 | End: 2020-01-03

## 2019-10-23 RX ORDER — GABAPENTIN 100 MG/1
200 CAPSULE ORAL AT BEDTIME
Qty: 90 CAPSULE | Refills: 1 | Status: SHIPPED | OUTPATIENT
Start: 2019-10-23 | End: 2020-01-28

## 2019-10-23 ASSESSMENT — ANXIETY QUESTIONNAIRES
3. WORRYING TOO MUCH ABOUT DIFFERENT THINGS: NOT AT ALL
6. BECOMING EASILY ANNOYED OR IRRITABLE: NOT AT ALL
GAD7 TOTAL SCORE: 0
7. FEELING AFRAID AS IF SOMETHING AWFUL MIGHT HAPPEN: NOT AT ALL
1. FEELING NERVOUS, ANXIOUS, OR ON EDGE: NOT AT ALL
5. BEING SO RESTLESS THAT IT IS HARD TO SIT STILL: NOT AT ALL
IF YOU CHECKED OFF ANY PROBLEMS ON THIS QUESTIONNAIRE, HOW DIFFICULT HAVE THESE PROBLEMS MADE IT FOR YOU TO DO YOUR WORK, TAKE CARE OF THINGS AT HOME, OR GET ALONG WITH OTHER PEOPLE: NOT DIFFICULT AT ALL
2. NOT BEING ABLE TO STOP OR CONTROL WORRYING: NOT AT ALL

## 2019-10-23 ASSESSMENT — PATIENT HEALTH QUESTIONNAIRE - PHQ9
5. POOR APPETITE OR OVEREATING: NOT AT ALL
SUM OF ALL RESPONSES TO PHQ QUESTIONS 1-9: 0

## 2019-10-23 ASSESSMENT — MIFFLIN-ST. JEOR: SCORE: 1205.65

## 2019-10-23 NOTE — PROGRESS NOTES
SUBJECTIVE:   CC: Allie Thao is an 59 year old woman who presents for preventive health visit.     Healthy Habits:     Getting at least 3 servings of Calcium per day:  NO    Bi-annual eye exam:  Yes    Dental care twice a year:  Yes    Sleep apnea or symptoms of sleep apnea:  Excessive snoring and Sleep apnea    Diet:  Regular (no restrictions)    Frequency of exercise:  1 day/week    Duration of exercise:  15-30 minutes    Taking medications regularly:  Yes    Medication side effects:  None    PHQ-2 Total Score: 0    Additional concerns today:  Yes          Hyperlipidemia Follow-Up      Are you having any of the following symptoms? (Select all that apply)  No complaints of shortness of breath, chest pain or pressure.  No increased sweating or nausea with activity.  No left-sided neck or arm pain.  No complaints of pain in calves when walking 1-2 blocks.    Are you regularly taking any medication or supplement to lower your cholesterol?   Yes- statin    Are you having muscle aches or other side effects that you think could be caused by your cholesterol lowering medication?  No      Depression Followup    How are you doing with your depression since your last visit? No change    Are you having other symptoms that might be associated with depression? No    Have you had a significant life event?  Relationship Concerns .  Recently broke up with her fiancé.      Are you feeling anxious or having panic attacks?   No    Do you have any concerns with your use of alcohol or other drugs? No    Social History     Tobacco Use     Smoking status: Former Smoker     Packs/day: 1.50     Years: 11.00     Pack years: 16.50     Types: Cigarettes     Last attempt to quit: 1988     Years since quittin.8     Smokeless tobacco: Never Used   Substance Use Topics     Alcohol use: Yes     Alcohol/week: 0.0 standard drinks     Comment: 0-3 beers a week     Drug use: No     PHQ 2019 2019 10/23/2019   PHQ-9 Total  Score 0 0 0   Q9: Thoughts of better off dead/self-harm past 2 weeks Not at all Not at all Not at all     REGINA-7 SCORE 2019 2019 10/23/2019   Total Score - - -   Total Score - - -   Total Score 8 4 0           Suicide Assessment Five-step Evaluation and Treatment (SAFE-T)  Hypothyroidism Follow-up      Since last visit, patient describes the following symptoms: Weight stable, no hair loss, no skin changes, no constipation, no loose stools    Patient complains of ongoing hot flashes.  She was put on gabapentin 100 mg at nighttime by the OB/GYN.  She thinks it has helped somewhat.  She gets hot flashes during the day as well which are still not well controlled.  She sweats and gets very beet red looking in her face and around her neck.  It usually happens at work.  She is very uncomfortable with that.  Wonders if the dose could be adjusted.        Today's PHQ-2 Score:   PHQ-2 (  [employer]) 10/23/2019   Q1: Little interest or pleasure in doing things 0   Q2: Feeling down, depressed or hopeless 0   PHQ-2 Score 0   Q1: Little interest or pleasure in doing things Not at all   Q2: Feeling down, depressed or hopeless Not at all   PHQ-2 Score 0       Abuse: Current or Past(Physical, Sexual or Emotional)- No  Do you feel safe in your environment? Yes    Social History     Tobacco Use     Smoking status: Former Smoker     Packs/day: 1.50     Years: 11.00     Pack years: 16.50     Types: Cigarettes     Last attempt to quit: 1988     Years since quittin.8     Smokeless tobacco: Never Used   Substance Use Topics     Alcohol use: Yes     Alcohol/week: 0.0 standard drinks     Comment: 0-3 beers a week     If you drink alcohol do you typically have >3 drinks per day or >7 drinks per week? No    Alcohol Use 10/23/2019   Prescreen: >3 drinks/day or >7 drinks/week? Yes   Prescreen: >3 drinks/day or >7 drinks/week? -   AUDIT SCORE  7       Reviewed orders with patient.  Reviewed health maintenance and updated orders  accordingly - Yes  BP Readings from Last 3 Encounters:   10/23/19 122/78   07/30/19 128/70   11/16/18 134/88    Wt Readings from Last 3 Encounters:   10/23/19 68 kg (150 lb)   07/30/19 68.9 kg (152 lb)   11/16/18 69.9 kg (154 lb)                  Patient Active Problem List   Diagnosis     Osteoarthritis cervical spine     Allergic rhinitis     History of basal cell carcinoma     Idiopathic urticaria     Hashimoto's thyroiditis     HYPERLIPIDEMIA LDL GOAL <160     Moderate Depression [296.22]     Flat epithelial atypia of breast     Advanced directives, counseling/discussion     Acquired hypothyroidism     Chronic rhinitis, unspecified type     ANIRUDH (obstructive sleep apnea)     Past Surgical History:   Procedure Laterality Date     COLONOSCOPY  8/2010    4 mm polyps mid sigmoid and mid descending colon, repeat 5 years     DEXA  10/2010    T score lumbar 0.8, femoral neck -0.8/-0.2 with BMD 0.975     HC EXC MALIG SKIN LESION TRUNK/ARM/LEG <=0.5 CM  4/2005    excision basal cell right forearm     HC EXCISION BREAST LESION W XRAY MARKER, OPEN SINGLE  2/2010    Right breast excisional biopsy: Focal complex sclerosing lesion, focal columnar cell hyperplasia, adenosis with microcalcifications, no atypia     HC MRI CERVICAL SPINE W/O CONTRAST  10/2009    mulitilevel disc and facet joint denerative changes, mild spinal stenosis lower cervical levels, varying degrees of foraminal stenosis rt>lt (especially C4-5)     HC NCS MOTOR W/O F-WAVE, EACH NERVE  12/2009    mild left and very mild right median nerve neuropathy     HC REPAIR INTERMED, WOUND TRUNK/ARM/LEG 7.6-12 CM  1970s    complex closure right knee wound     MRA ANGIOGRAM BRAIN & MRI BRAIN W/O CONTRAST  4/2013    normal     SONO PELVIS COMPLETE  9/2013    2 small unterine myoma (12 and 14 mm) NOT approximating endometrium, 4  mm endoemtrial thickness       Social History     Tobacco Use     Smoking status: Former Smoker     Packs/day: 1.50     Years: 11.00     Pack  years: 16.50     Types: Cigarettes     Last attempt to quit: 1988     Years since quittin.8     Smokeless tobacco: Never Used   Substance Use Topics     Alcohol use: Yes     Alcohol/week: 0.0 standard drinks     Comment: 0-3 beers a week     Family History   Problem Relation Age of Onset     Lipids Mother      Cancer Mother         several basal cell skin cancers     Heart Disease Mother         atrial fibrillation/pacemaker, rheumatic heart disease - valve replacement     Gastrointestinal Disease Mother         GI bleeding from gastritis/diverticulitis     Alcohol/Drug Father      Respiratory Father         COPD,  66, smoker     Alzheimer Disease Maternal Grandmother         onset early 80s     Cancer - colorectal Maternal Grandfather          age 80     Breast Cancer Maternal Aunt         onset early 60s     Blood Disease Sister         thrombophlebitis, negative factor testing     Connective Tissue Disorder Brother         bilateral hip replacement 40s for slipped capital epiphyses     Neurologic Disorder Other         nieces/nephews with Tourette's     Osteoarthritis Sister         hands         Current Outpatient Medications   Medication Sig Dispense Refill     buPROPion (WELLBUTRIN XL) 300 MG 24 hr tablet Take 1 tablet (300 mg) by mouth daily 90 tablet 1     Calcium-Vitamin D-Vitamin K 500-100-40 MG-UNT-MCG CHEW  90 tablet      estradiol (ESTRACE) 0.1 MG/GM cream Place 2 g vaginally twice a week       fexofenadine (ALLEGRA) 180 MG tablet Take 1 tablet by mouth daily. 90 tablet 3     gabapentin (NEURONTIN) 100 MG capsule Take 2 capsules (200 mg) by mouth At Bedtime 90 capsule 1     ibuprofen (ADVIL/MOTRIN) 200 MG capsule Take 200 mg by mouth 2 times daily       Multiple Vitamin (DAILY MULTIVITAMIN PO) Take 1 tablet by mouth daily       simvastatin (ZOCOR) 10 MG tablet Take 1 tablet (10 mg) by mouth At Bedtime 90 tablet 3     SYNTHROID 100 MCG tablet Take 1 tablet (100 mcg) by mouth daily 90  "tablet 3     venlafaxine (EFFEXOR-XR) 75 MG 24 hr capsule Take 1 capsule (75 mg) by mouth daily 90 capsule 1     Allergies   Allergen Reactions     Cats      Ragweeds        Mammogram Screening: Patient over age 50, mutual decision to screen reflected in health maintenance.    Pertinent mammograms are reviewed under the imaging tab.  History of abnormal Pap smear: NO - age 30-65 PAP every 5 years with negative HPV co-testing recommended  PAP / HPV Latest Ref Rng & Units 10/9/2017 9/17/2015 9/18/2013   PAP - NIL NIL NIL   HPV 16 DNA NEG:Negative Negative Negative -   HPV 18 DNA NEG:Negative Negative Negative -   OTHER HR HPV NEG:Negative Negative Negative -     Reviewed and updated as needed this visit by clinical staff  Tobacco  Allergies  Meds  Problems  Med Hx  Surg Hx  Fam Hx         Reviewed and updated as needed this visit by Provider  Tobacco  Allergies  Meds  Problems  Med Hx  Surg Hx  Fam Hx            Review of Systems  CONSTITUTIONAL: NEGATIVE for fever, chills, change in weight  INTEGUMENTARY/SKIN: NEGATIVE for worrisome rashes, moles or lesions  EYES: NEGATIVE for vision changes or irritation  ENT: NEGATIVE for ear, mouth and throat problems  RESP: NEGATIVE for significant cough or SOB  BREAST: NEGATIVE for masses, tenderness or discharge  CV: NEGATIVE for chest pain, palpitations or peripheral edema  GI: NEGATIVE for nausea, abdominal pain, heartburn, or change in bowel habits  : NEGATIVE for unusual urinary or vaginal symptoms. No vaginal bleeding.  MUSCULOSKELETAL: NEGATIVE for significant arthralgias or myalgia  NEURO: NEGATIVE for weakness, dizziness or paresthesias  PSYCHIATRIC: NEGATIVE for changes in mood or affect      OBJECTIVE:   /78   Pulse 83   Temp 97.4  F (36.3  C) (Tympanic)   Ht 1.57 m (5' 1.81\")   Wt 68 kg (150 lb)   LMP 08/18/2010   SpO2 96%   BMI 27.60 kg/m    Physical Exam  GENERAL: healthy, alert and no distress  EYES: Eyes grossly normal to inspection, " PERRL and conjunctivae and sclerae normal  HENT: ear canals and TM's normal, nose and mouth without ulcers or lesions  NECK: no adenopathy, no asymmetry, masses, or scars and thyroid normal to palpation  RESP: lungs clear to auscultation - no rales, rhonchi or wheezes  BREAST: normal without masses, tenderness or nipple discharge and no palpable axillary masses or adenopathy  CV: regular rate and rhythm, normal S1 S2, no S3 or S4, no murmur, click or rub, no peripheral edema and peripheral pulses strong  ABDOMEN: soft, nontender, no hepatosplenomegaly, no masses and bowel sounds normal  MS: no gross musculoskeletal defects noted, no edema  SKIN: no suspicious lesions or rashes  NEURO: Normal strength and tone, mentation intact and speech normal  PSYCH: mentation appears normal, affect normal/bright        ASSESSMENT/PLAN:   1. Routine general medical examination at a health care facility  Recommending to stop estrogen vaginal cream as she is currently not sexually active.    2. Hot flash, menopausal  Recommending to increase the dose of gabapentin to 200 mg daily.  If symptoms are not improving, instructed to notify me back.  - gabapentin (NEURONTIN) 100 MG capsule; Take 2 capsules (200 mg) by mouth At Bedtime  Dispense: 90 capsule; Refill: 1    3. Hyperlipidemia LDL goal <160  Previously well controlled.    - simvastatin (ZOCOR) 10 MG tablet; Take 1 tablet (10 mg) by mouth At Bedtime  Dispense: 90 tablet; Refill: 3  - Lipid panel reflex to direct LDL Fasting  - Comprehensive metabolic panel    4. Acquired hypothyroidism  TSH   Date Value Ref Range Status   07/25/2019 5.40 (H) 0.40 - 4.00 mU/L Final     Last TSH was done a few months ago and was not normal.  Recheck labs ordered.  - SYNTHROID 100 MCG tablet; Take 1 tablet (100 mcg) by mouth daily  Dispense: 90 tablet; Refill: 3  - TSH    5. Need for vaccination    - HEPATITIS A VACCINE, ADULT  [95222]  - 1st  Administration  [28220]    6. Moderate Depression  "[296.22]  Symptoms are well controlled.  Resume current medications which includes Wellbutrin and venlafaxine.  Follow-up with me in 6 months for recheck unless symptoms worsen.      COUNSELING:  Reviewed preventive health counseling, as reflected in patient instructions       Regular exercise       Healthy diet/nutrition    Estimated body mass index is 27.6 kg/m  as calculated from the following:    Height as of this encounter: 1.57 m (5' 1.81\").    Weight as of this encounter: 68 kg (150 lb).    Weight management plan: Discussed healthy diet and exercise guidelines     reports that she quit smoking about 31 years ago. Her smoking use included cigarettes. She has a 16.50 pack-year smoking history. She has never used smokeless tobacco.      Counseling Resources:  ATP IV Guidelines  Pooled Cohorts Equation Calculator  Breast Cancer Risk Calculator  FRAX Risk Assessment  ICSI Preventive Guidelines  Dietary Guidelines for Americans, 2010  USDA's MyPlate  ASA Prophylaxis  Lung CA Screening    Donna Cheatham MD  [facility]  "

## 2019-10-24 ASSESSMENT — ANXIETY QUESTIONNAIRES: GAD7 TOTAL SCORE: 0

## 2020-01-02 DIAGNOSIS — E78.5 HYPERLIPIDEMIA LDL GOAL <160: ICD-10-CM

## 2020-01-02 DIAGNOSIS — E03.9 ACQUIRED HYPOTHYROIDISM: ICD-10-CM

## 2020-01-02 NOTE — TELEPHONE ENCOUNTER
"Requested Prescriptions   Pending Prescriptions Disp Refills     simvastatin (ZOCOR) 10 MG tablet [Pharmacy Med Name: SIMVASTATIN TABS 10MG] 90 tablet 4     Sig: TAKE 1 TABLET AT BEDTIME   Last Written Prescription Date:  10/23/2019  Last Fill Quantity: 90 tablet,  # refills: 3   Last office visit: 10/23/2019 with prescribing provider:  Donna Cheatham   Future Office Visit:        Statins Protocol Passed - 1/2/2020  2:17 PM        Passed - LDL on file in past 12 months     Recent Labs   Lab Test 10/23/19  0904   LDL 92             Passed - No abnormal creatine kinase in past 12 months     No lab results found.             Passed - Recent (12 mo) or future (30 days) visit within the authorizing provider's specialty     Patient has had an office visit with the authorizing provider or a provider within the authorizing providers department within the previous 12 mos or has a future within next 30 days. See \"Patient Info\" tab in [employer], or \"Choose Columns\" in Meds & Orders section of the refill encounter.              Passed - Medication is active on med list        Passed - Patient is age 18 or older        Passed - No active pregnancy on record        Passed - No positive pregnancy test in past 12 months        SYNTHROID 100 MCG tablet [Pharmacy Med Name: SYNTHROID TABS 100MCG] 90 tablet 4     Sig: TAKE 1 TABLET DAILY   Last Written Prescription Date:  10/23/2019  Last Fill Quantity: 90 tablet,  # refills: 3   Last office visit: 10/23/2019 with prescribing provider:  Donna Cheatham   Future Office Visit:        Thyroid Protocol Passed - 1/2/2020  2:17 PM        Passed - Patient is 12 years or older        Passed - Recent (12 mo) or future (30 days) visit within the authorizing provider's specialty     Patient has had an office visit with the authorizing provider or a provider within the authorizing providers department within the previous 12 mos or has a future within next 30 days. See \"Patient Info\" tab in [email or web address], or " "\"Choose Columns\" in Meds & Orders section of the refill encounter.              Passed - Medication is active on med list        Passed - Normal TSH on file in past 12 months     Recent Labs   Lab Test 10/23/19  0904   TSH 0.61              Passed - No active pregnancy on record     If patient is pregnant or has had a positive pregnancy test, please check TSH.          Passed - No positive pregnancy test in past 12 months     If patient is pregnant or has had a positive pregnancy test, please check TSH.            "

## 2020-01-03 RX ORDER — LEVOTHYROXINE SODIUM 100 MCG
TABLET ORAL
Qty: 90 TABLET | Refills: 2 | Status: SHIPPED | OUTPATIENT
Start: 2020-01-03 | End: 2020-10-20

## 2020-01-03 RX ORDER — SIMVASTATIN 10 MG
TABLET ORAL
Qty: 90 TABLET | Refills: 2 | Status: SHIPPED | OUTPATIENT
Start: 2020-01-03 | End: 2020-10-20

## 2020-01-03 NOTE — TELEPHONE ENCOUNTER
Prescription approved per [facility] Refill Protocol.    Remaining refills canceled at [facility] from 10/23/19 rxs for simvastatin and synthroid.    Brook NEWMAN RN  EP Triage

## 2020-01-28 DIAGNOSIS — N95.1 HOT FLASH, MENOPAUSAL: ICD-10-CM

## 2020-01-28 RX ORDER — GABAPENTIN 100 MG/1
CAPSULE ORAL
Qty: 60 CAPSULE | Refills: 2 | Status: SHIPPED | OUTPATIENT
Start: 2020-01-28 | End: 2020-05-15

## 2020-01-28 NOTE — TELEPHONE ENCOUNTER
Requested Prescriptions   Pending Prescriptions Disp Refills     gabapentin (NEURONTIN) 100 MG capsule [Pharmacy Med Name: GABAPENTIN 100 MG CAPSULE] 60 capsule 2     Sig: TAKE 2 CAPSULES BY MOUTH AT BEDTIME       There is no refill protocol information for this order        Last Written Prescription Date:  10/23/2019  Last Fill Quantity: 90,  # refills: 1   Last office visit: 10/23/2019 with prescribing provider:  10/23/2019   Future Office Visit:

## 2020-02-01 DIAGNOSIS — F41.9 ANXIETY: ICD-10-CM

## 2020-02-01 DIAGNOSIS — F32.1 MAJOR DEPRESSIVE DISORDER, SINGLE EPISODE, MODERATE (H): ICD-10-CM

## 2020-02-03 RX ORDER — BUPROPION HYDROCHLORIDE 300 MG/1
TABLET ORAL
Qty: 90 TABLET | Refills: 1 | Status: SHIPPED | OUTPATIENT
Start: 2020-02-03 | End: 2020-07-16

## 2020-02-03 RX ORDER — VENLAFAXINE HYDROCHLORIDE 75 MG/1
CAPSULE, EXTENDED RELEASE ORAL
Qty: 90 CAPSULE | Refills: 1 | Status: SHIPPED | OUTPATIENT
Start: 2020-02-03 | End: 2020-07-16

## 2020-02-03 NOTE — TELEPHONE ENCOUNTER
"Requested Prescriptions   Pending Prescriptions Disp Refills     venlafaxine (EFFEXOR-XR) 75 MG 24 hr capsule [Pharmacy Med Name: VENLAFAXINE HCL ER CAPS 75MG] 90 capsule 4     Sig: TAKE 1 CAPSULE DAILY   Last Written Prescription Date:  7/30/2019  Last Fill Quantity: 90,  # refills: 1   Last office visit: 10/23/2019 with prescribing provider:  [place]   Future Office Visit:        Serotonin-Norepinephrine Reuptake Inhibitors  Passed - 2/1/2020 10:00 AM        Passed - Blood pressure under 140/90 in past 12 months     BP Readings from Last 3 Encounters:   10/23/19 122/78   07/30/19 128/70   11/16/18 134/88                 Passed - PHQ-9 score of less than 5 in past 6 months     Please review last PHQ-9 score.           Passed - Medication is active on med list        Passed - Patient is age 18 or older        Passed - No active pregnancy on record        Passed - Normal serum creatinine on file in past 12 months     Recent Labs   Lab Test 10/23/19  0904   CR 0.75             Passed - No positive pregnancy test in past 12 months        Passed - Recent (6 mo) or future (30 days) visit within the authorizing provider's specialty     Patient had office visit in the last 6 months or has a visit in the next 30 days with authorizing provider or within the authorizing provider's specialty.  See \"Patient Info\" tab in inbasket, or \"Choose Columns\" in Meds & Orders section of the refill encounter.            buPROPion (WELLBUTRIN XL) 300 MG 24 hr tablet [Pharmacy Med Name: BUPROPION HCL XL TABS 300MG] 90 tablet 4     Sig: TAKE 1 TABLET DAILY   Last Written Prescription Date:  7/30/2019  Last Fill Quantity: 90,  # refills: 1   Last office visit: 10/23/2019 with prescribing provider:  [place]   Future Office Visit:        SSRIs Protocol Passed - 2/1/2020 10:00 AM        Passed - PHQ-9 score less than 5 in past 6 months     Please review last PHQ-9 score.           Passed - Medication is Bupropion     If the medication is Bupropion " "(Wellbutrin), and the patient is taking for smoking cessation; OK to refill.          Passed - Medication is active on med list        Passed - Patient is age 18 or older        Passed - No active pregnancy on record        Passed - No positive pregnancy test in last 12 months        Passed - Recent (6 mo) or future (30 days) visit within the authorizing provider's specialty     Patient had office visit in the last 6 months or has a visit in the next 30 days with authorizing provider or within the authorizing provider's specialty.  See \"Patient Info\" tab in inbasket, or \"Choose Columns\" in Meds & Orders section of the refill encounter.            Filled per [facility] protocol.     PAUL MichelleN, RN  [employer] Triage    "

## 2020-05-15 DIAGNOSIS — N95.1 HOT FLASH, MENOPAUSAL: ICD-10-CM

## 2020-05-15 RX ORDER — GABAPENTIN 100 MG/1
CAPSULE ORAL
Qty: 60 CAPSULE | Refills: 2 | Status: SHIPPED | OUTPATIENT
Start: 2020-05-15 | End: 2020-08-26

## 2020-05-15 NOTE — TELEPHONE ENCOUNTER
Gabapentin 100 mg      Last Written Prescription Date:  1/28/20  Last Fill Quantity: 60,   # refills: 2  Last Office Visit: 10/23/19 [place]  Future Office visit:       Routing refill request to provider for review/approval because:  Drug not on the [facility], UMP or  Health refill protocol or controlled substance    [place] reviewed:  reviewed- no concerns on 5/15/20    [place]:  [email or web address]    Brook NEWMAN RN  EP Triage

## 2020-06-29 NOTE — PROGRESS NOTES
"Allie Thao is a 60 year old female who is being evaluated via a billable video visit.      The patient has been notified of following:     \"This video visit will be conducted via a call between you and your physician/provider. We have found that certain health care needs can be provided without the need for an in-person physical exam.  This service lets us provide the care you need with a video conversation.  If a prescription is necessary we can send it directly to your pharmacy.  If lab work is needed we can place an order for that and you can then stop by our lab to have the test done at a later time.    Video visits are billed at different rates depending on your insurance coverage.  Please reach out to your insurance provider with any questions.    If during the course of the call the physician/provider feels a video visit is not appropriate, you will not be charged for this service.\"    Patient has given verbal consent for Video visit? Yes  How would you like to obtain your AVS? [employer]  Patient would like the video invitation sent by: [employer] - [phone]  Will anyone else be joining your video visit? No    Subjective     Allie Thao is a 60 year old female who presents today via video visit for the following health issues:    HPI  Clavicle lump    Onset: 02/2020    Description:   Location: clavicle - Right - enlargement - no pain -- noticed 2-3 months ago  Character: in between breast - feels like pulled muscle off and on    Intensity: moderate when made appt - mild today    Progression of Symptoms: waxing and waning    Accompanying Signs & Symptoms:  Other symptoms:     History:   Previous similar pain: no       Precipitating factors:   Trauma or overuse: YES- does have osteoarthritis in neck    Alleviating factors:  Improved by: nothing     Therapies Tried and outcome: nothing           Video Start Time: 1:31 PM        Patient Active Problem List   Diagnosis     Osteoarthritis cervical spine "     Allergic rhinitis     History of basal cell carcinoma     Idiopathic urticaria     Hashimoto's thyroiditis     HYPERLIPIDEMIA LDL GOAL <160     Moderate Depression [296.22]     Flat epithelial atypia of breast     Advanced directives, counseling/discussion     Acquired hypothyroidism     Chronic rhinitis, unspecified type     ANIRUDH (obstructive sleep apnea)     Past Surgical History:   Procedure Laterality Date     COLONOSCOPY  2010    4 mm polyps mid sigmoid and mid descending colon, repeat 5 years     DEXA  10/2010    T score lumbar 0.8, femoral neck -0.8/-0.2 with BMD 0.975     HC EXC MALIG SKIN LESION TRUNK/ARM/LEG <=0.5 CM  2005    excision basal cell right forearm     HC EXCISION BREAST LESION W XRAY MARKER, OPEN SINGLE  2010    Right breast excisional biopsy: Focal complex sclerosing lesion, focal columnar cell hyperplasia, adenosis with microcalcifications, no atypia     HC MRI CERVICAL SPINE W/O CONTRAST  10/2009    mulitilevel disc and facet joint denerative changes, mild spinal stenosis lower cervical levels, varying degrees of foraminal stenosis rt>lt (especially C4-5)     HC NCS MOTOR W/O F-WAVE, EACH NERVE  2009    mild left and very mild right median nerve neuropathy     HC REPAIR INTERMED, WOUND TRUNK/ARM/LEG 7.6-12 CM  1970s    complex closure right knee wound     MRA ANGIOGRAM BRAIN & MRI BRAIN W/O CONTRAST  2013    normal     SONO PELVIS COMPLETE  2013    2 small unterine myoma (12 and 14 mm) NOT approximating endometrium, 4  mm endoemtrial thickness       Social History     Tobacco Use     Smoking status: Former Smoker     Packs/day: 1.50     Years: 14.00     Pack years: 21.00     Types: Cigarettes     Start date: 6/15/1974     Last attempt to quit: 1988     Years since quittin.5     Smokeless tobacco: Never Used   Substance Use Topics     Alcohol use: Yes     Alcohol/week: 0.0 standard drinks     Comment: 0-3 beers a week     Family History   Problem Relation Age of Onset      Lipids Mother      Cancer Mother         several basal cell skin cancers     Heart Disease Mother         atrial fibrillation/pacemaker, rheumatic heart disease - valve replacement     Gastrointestinal Disease Mother         GI bleeding from gastritis/diverticulitis     Hyperlipidemia Mother      Other Cancer Mother         skin cancer     Depression Mother      Thyroid Disease Mother      Alcohol/Drug Father      Respiratory Father         COPD,  66, smoker     Substance Abuse Father      Alzheimer Disease Maternal Grandmother         onset early 80s     Cancer - colorectal Maternal Grandfather          age 80     Colon Cancer Maternal Grandfather      Breast Cancer Maternal Aunt         onset early 60s     Blood Disease Sister         thrombophlebitis, negative factor testing     Hyperlipidemia Sister      Melanoma Sister      Connective Tissue Disorder Brother         bilateral hip replacement 40s for slipped capital epiphyses     Melanoma Brother      Neurologic Disorder Other         nieces/nephews with Tourette's     Osteoarthritis Sister         hands         Current Outpatient Medications   Medication Sig Dispense Refill     buPROPion (WELLBUTRIN XL) 300 MG 24 hr tablet TAKE 1 TABLET DAILY 90 tablet 1     Calcium-Vitamin D-Vitamin K 500-100-40 MG-UNT-MCG CHEW  90 tablet      fexofenadine (ALLEGRA) 180 MG tablet Take 1 tablet by mouth daily. 90 tablet 3     gabapentin (NEURONTIN) 100 MG capsule TAKE 2 CAPSULES BY MOUTH EVERY DAY AT BEDTIME 60 capsule 2     Multiple Vitamin (DAILY MULTIVITAMIN PO) Take 1 tablet by mouth daily       simvastatin (ZOCOR) 10 MG tablet TAKE 1 TABLET AT BEDTIME 90 tablet 2     SYNTHROID 100 MCG tablet TAKE 1 TABLET DAILY 90 tablet 2     venlafaxine (EFFEXOR-XR) 75 MG 24 hr capsule TAKE 1 CAPSULE DAILY 90 capsule 1     estradiol (ESTRACE) 0.1 MG/GM cream Place 2 g vaginally twice a week       Allergies   Allergen Reactions     Cats      Ragweeds        Reviewed and  "updated as needed this visit by Provider  Freddy Fair         Review of Systems   CONSTITUTIONAL: NEGATIVE for fever, chills, change in weight  ENT/MOUTH: NEGATIVE for ear, mouth and throat problems  RESP: NEGATIVE for significant cough or SOB  CV: NEGATIVE for chest pain, palpitations or peripheral edema      Objective             Physical Exam     GENERAL: Healthy, alert and no distress  Chest wall: On the video visit I could see that right clavicle at the medial and is more prominent as compared to the left side.  There is no erythema.  Patient does not report of tenderness on palpation  RESP: No audible wheeze, cough, or visible cyanosis.  No visible retractions or increased work of breathing.    SKIN: Visible skin clear. No significant rash, abnormal pigmentation or lesions.  NEURO: Cranial nerves grossly intact.  Mentation and speech appropriate for age.  PSYCH: Mentation appears normal, affect normal/bright, judgement and insight intact, normal speech and appearance well-groomed.              Assessment & Plan       ICD-10-CM    1. Asymmetry of clavicles  Q74.0 XR Sternoclavicular Joint G/E 3 Views     Orthopedic & Spine  Referral        BMI:     X-ray ordered to be done.  X-ray showed sclerosis of the medial end of the clavicle as well as sternum and degenerative changes at the sternoclavicular joint on the right side.  I recommended her to be seen by orthopedics.  Referral given.    Estimated body mass index is 27.6 kg/m  as calculated from the following:    Height as of 10/23/19: 1.57 m (5' 1.81\").    Weight as of 10/23/19: 68 kg (150 lb).               Return in about 1 day (around 7/1/2020) for X-ray appointment .    Donna Cheatham MD  [facility] [place]      Video-Visit Details    Type of service:  Video Visit    Video End Time:1:46 PM    Originating Location (pt. Location): Home    Distant Location (provider location):  [facility] [place]     Platform used for Video Visit: " Doximity    Return in about 1 day (around 7/1/2020) for X-ray appointment .       Donna Cheatham MD

## 2020-06-30 ENCOUNTER — VIRTUAL VISIT (OUTPATIENT)
Dept: FAMILY MEDICINE | Facility: CLINIC | Age: 60
End: 2020-06-30
Payer: COMMERCIAL

## 2020-06-30 DIAGNOSIS — Q74.0 ASYMMETRY OF CLAVICLES: Primary | ICD-10-CM

## 2020-06-30 PROCEDURE — 99214 OFFICE O/P EST MOD 30 MIN: CPT | Mod: 95 | Performed by: FAMILY MEDICINE

## 2020-07-02 ENCOUNTER — APPOINTMENT (OUTPATIENT)
Dept: GENERAL RADIOLOGY | Facility: CLINIC | Age: 60
End: 2020-07-02
Payer: COMMERCIAL

## 2020-07-02 ENCOUNTER — ANCILLARY PROCEDURE (OUTPATIENT)
Dept: GENERAL RADIOLOGY | Facility: CLINIC | Age: 60
End: 2020-07-02
Attending: FAMILY MEDICINE
Payer: COMMERCIAL

## 2020-07-02 DIAGNOSIS — Q74.0 ASYMMETRY OF CLAVICLES: ICD-10-CM

## 2020-07-02 PROCEDURE — 71130 X-RAY STRENOCLAVIC JT 3/>VWS: CPT | Mod: FY

## 2020-07-06 NOTE — RESULT ENCOUNTER NOTE
Please call the patient to notify patient that her x-ray shows that the inner end of the clavicle has some signs of degeneration at the joint with the sternum.  These bone changes are noted in the clavicle as well as the sternum  besides the joint.    I would like the patient to be seen by orthopedics to review the findings and to examine her.  Referral placed.      Donna Cheatham MD

## 2020-07-13 DIAGNOSIS — F41.9 ANXIETY: ICD-10-CM

## 2020-07-13 DIAGNOSIS — F32.1 MAJOR DEPRESSIVE DISORDER, SINGLE EPISODE, MODERATE (H): ICD-10-CM

## 2020-07-15 NOTE — TELEPHONE ENCOUNTER
Spoke with patient, she will log onto [employer] today to complete the screenings.     Ermelinda Lind RN, BSN  [facility] [place]

## 2020-07-16 RX ORDER — BUPROPION HYDROCHLORIDE 300 MG/1
TABLET ORAL
Qty: 90 TABLET | Refills: 1 | Status: SHIPPED | OUTPATIENT
Start: 2020-07-16 | End: 2021-01-25

## 2020-07-16 RX ORDER — VENLAFAXINE HYDROCHLORIDE 75 MG/1
CAPSULE, EXTENDED RELEASE ORAL
Qty: 90 CAPSULE | Refills: 1 | Status: SHIPPED | OUTPATIENT
Start: 2020-07-16 | End: 2021-01-08

## 2020-07-16 NOTE — TELEPHONE ENCOUNTER
PHQ 7/30/2019 10/23/2019 7/16/2020   PHQ-9 Total Score 0 0 0   Q9: Thoughts of better off dead/self-harm past 2 weeks Not at all Not at all Not at all     REGINA-7 SCORE 7/30/2019 10/23/2019 7/16/2020   Total Score - - -   Total Score - - 1 (minimal anxiety)   Total Score 4 0 1     Prescription approved per [facility] Refill Protocol.  Loretta Chris RN   [facility] - Triage

## 2020-07-31 ENCOUNTER — HOSPITAL ENCOUNTER (OUTPATIENT)
Dept: MAMMOGRAPHY | Facility: CLINIC | Age: 60
Discharge: HOME | End: 2020-07-31
Attending: FAMILY MEDICINE | Admitting: FAMILY MEDICINE
Payer: COMMERCIAL

## 2020-07-31 DIAGNOSIS — Z12.31 VISIT FOR SCREENING MAMMOGRAM: ICD-10-CM

## 2020-07-31 PROCEDURE — 77067 SCR MAMMO BI INCL CAD: CPT

## 2020-08-11 ENCOUNTER — OFFICE VISIT (OUTPATIENT)
Dept: ORTHOPEDICS | Facility: CLINIC | Age: 60
End: 2020-08-11
Attending: FAMILY MEDICINE
Payer: COMMERCIAL

## 2020-08-11 VITALS
DIASTOLIC BLOOD PRESSURE: 80 MMHG | BODY MASS INDEX: 28.16 KG/M2 | SYSTOLIC BLOOD PRESSURE: 126 MMHG | WEIGHT: 153 LBS | HEIGHT: 62 IN

## 2020-08-11 DIAGNOSIS — M19.032 LOCALIZED PRIMARY OSTEOARTHROSIS OF CARPOMETACARPAL JOINT OF LEFT WRIST: ICD-10-CM

## 2020-08-11 DIAGNOSIS — M19.011: Primary | ICD-10-CM

## 2020-08-11 DIAGNOSIS — Q74.0 ASYMMETRY OF CLAVICLES: ICD-10-CM

## 2020-08-11 PROCEDURE — 99203 OFFICE O/P NEW LOW 30 MIN: CPT | Performed by: FAMILY MEDICINE

## 2020-08-11 ASSESSMENT — MIFFLIN-ST. JEOR: SCORE: 1217.25

## 2020-08-11 ASSESSMENT — ENCOUNTER SYMPTOMS
FEVER: 0
CHILLS: 0

## 2020-08-11 NOTE — PATIENT INSTRUCTIONS
If desiring a cortisone injection for sternoclavicular joint, call the office for a referral.       If desiring a thumb injection:    Ultrasound Procedure scheduling instructions    Call [facility] central scheduling:  - [phone]    Tell them you need a 40-minute procedure appointment with Dr. Camejo at the [facility].     Specify which joint is being injected.    At the present time, these are only done at the [facility], next to [facility]:  [address] [place]      If you are having any trouble getting to your appointment on time, please call the  of the [facility]: [phone]
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
## 2020-08-26 DIAGNOSIS — N95.1 HOT FLASH, MENOPAUSAL: ICD-10-CM

## 2020-08-26 RX ORDER — GABAPENTIN 100 MG/1
CAPSULE ORAL
Qty: 60 CAPSULE | Refills: 2 | Status: SHIPPED | OUTPATIENT
Start: 2020-08-26 | End: 2020-10-20

## 2020-10-20 ENCOUNTER — OFFICE VISIT (OUTPATIENT)
Dept: FAMILY MEDICINE | Facility: CLINIC | Age: 60
End: 2020-10-20
Payer: COMMERCIAL

## 2020-10-20 VITALS
DIASTOLIC BLOOD PRESSURE: 78 MMHG | WEIGHT: 153 LBS | TEMPERATURE: 97.3 F | HEART RATE: 83 BPM | HEIGHT: 62 IN | SYSTOLIC BLOOD PRESSURE: 112 MMHG | RESPIRATION RATE: 14 BRPM | BODY MASS INDEX: 28.16 KG/M2 | OXYGEN SATURATION: 98 %

## 2020-10-20 DIAGNOSIS — E03.9 ACQUIRED HYPOTHYROIDISM: ICD-10-CM

## 2020-10-20 DIAGNOSIS — Z12.11 COLON CANCER SCREENING: ICD-10-CM

## 2020-10-20 DIAGNOSIS — N95.1 HOT FLASH, MENOPAUSAL: ICD-10-CM

## 2020-10-20 DIAGNOSIS — Z12.4 CERVICAL CANCER SCREENING: ICD-10-CM

## 2020-10-20 DIAGNOSIS — E78.5 HYPERLIPIDEMIA LDL GOAL <160: ICD-10-CM

## 2020-10-20 DIAGNOSIS — Z00.00 ANNUAL PHYSICAL EXAM: Primary | ICD-10-CM

## 2020-10-20 PROCEDURE — 87624 HPV HI-RISK TYP POOLED RSLT: CPT | Performed by: FAMILY MEDICINE

## 2020-10-20 PROCEDURE — 99396 PREV VISIT EST AGE 40-64: CPT | Performed by: FAMILY MEDICINE

## 2020-10-20 PROCEDURE — G0145 SCR C/V CYTO,THINLAYER,RESCR: HCPCS | Performed by: FAMILY MEDICINE

## 2020-10-20 RX ORDER — LEVOTHYROXINE SODIUM 100 UG/1
100 TABLET ORAL DAILY
Qty: 30 TABLET | Refills: 0 | Status: SHIPPED | OUTPATIENT
Start: 2020-10-20 | End: 2020-10-28

## 2020-10-20 RX ORDER — SIMVASTATIN 10 MG
10 TABLET ORAL AT BEDTIME
Qty: 90 TABLET | Refills: 3 | Status: SHIPPED | OUTPATIENT
Start: 2020-10-20 | End: 2023-01-05

## 2020-10-20 RX ORDER — LEVOTHYROXINE SODIUM 100 MCG
100 TABLET ORAL DAILY
Qty: 90 TABLET | Refills: 3 | Status: SHIPPED | OUTPATIENT
Start: 2020-10-20 | End: 2020-10-23

## 2020-10-20 RX ORDER — GABAPENTIN 100 MG/1
100 CAPSULE ORAL 2 TIMES DAILY
Qty: 180 CAPSULE | Refills: 3 | Status: SHIPPED | OUTPATIENT
Start: 2020-10-20 | End: 2023-01-05

## 2020-10-20 ASSESSMENT — ANXIETY QUESTIONNAIRES
3. WORRYING TOO MUCH ABOUT DIFFERENT THINGS: NOT AT ALL
2. NOT BEING ABLE TO STOP OR CONTROL WORRYING: NOT AT ALL
GAD7 TOTAL SCORE: 2
6. BECOMING EASILY ANNOYED OR IRRITABLE: NOT AT ALL
5. BEING SO RESTLESS THAT IT IS HARD TO SIT STILL: NOT AT ALL
7. FEELING AFRAID AS IF SOMETHING AWFUL MIGHT HAPPEN: SEVERAL DAYS
1. FEELING NERVOUS, ANXIOUS, OR ON EDGE: SEVERAL DAYS

## 2020-10-20 ASSESSMENT — MIFFLIN-ST. JEOR: SCORE: 1217.25

## 2020-10-20 ASSESSMENT — PATIENT HEALTH QUESTIONNAIRE - PHQ9
SUM OF ALL RESPONSES TO PHQ QUESTIONS 1-9: 1
5. POOR APPETITE OR OVEREATING: NOT AT ALL

## 2020-10-20 NOTE — PROGRESS NOTES
SUBJECTIVE:   CC: Allie Thao is an 60 year old woman who presents for preventive health visit.     Patient has been advised of split billing requirements and indicates understanding: Yes  Healthy Habits:     Getting at least 3 servings of Calcium per day:  NO    Bi-annual eye exam:  Yes    Dental care twice a year:  Yes    Sleep apnea or symptoms of sleep apnea:  Sleep apnea    Diet:  Regular (no restrictions)    Frequency of exercise:  2-3 days/week    Duration of exercise:  Less than 15 minutes    Taking medications regularly:  Yes    Medication side effects:  None    PHQ-2 Total Score: 0    Additional concerns today:  No          Hyperlipidemia Follow-Up      Are you regularly taking any medication or supplement to lower your cholesterol?   Yes- statin    Are you having muscle aches or other side effects that you think could be caused by your cholesterol lowering medication?  No    Hypothyroidism Follow-up      Since last visit, patient describes the following symptoms: Weight stable, no hair loss, no skin changes, no constipation, no loose stools      Today's PHQ-2 Score:   PHQ-2 ( 1999 Pf[employer]) 10/16/2020   Q1: Little interest or pleasure in doing things 0   Q2: Feeling down, depressed or hopeless 0   PHQ-2 Score 0   Q1: Little interest or pleasure in doing things Not at all   Q2: Feeling down, depressed or hopeless Not at all   PHQ-2 Score 0       Abuse: Current or Past (Physical, Sexual or Emotional) - No  Do you feel safe in your environment? Yes    Have you ever done Advance Care Planning? (For example, a Health Directive, POLST, or a discussion with a medical provider or your loved ones about your wishes): No, advance care planning information given to patient to review.  Patient plans to discuss their wishes with loved ones or provider.      Social History     Tobacco Use     Smoking status: Former Smoker     Packs/day: 1.50     Years: 14.00     Pack years: 21.00     Types: Cigarettes     Start  date: 6/15/1974     Quit date: 1988     Years since quittin.8     Smokeless tobacco: Never Used   Substance Use Topics     Alcohol use: Yes     Alcohol/week: 0.0 standard drinks     Comment: 0-3 beers a week     If you drink alcohol do you typically have >3 drinks per day or >7 drinks per week? No      AUDIT - Alcohol Use Disorders Identification Test - Reproduced from the World Health Organization Audit 2001 (Second Edition) 10/16/2020   1.  How often do you have a drink containing alcohol? 4 or more times a week   2.  How many drinks containing alcohol do you have on a typical day when you are drinking? 3 or 4   3.  How often do you have five or more drinks on one occasion? Never   4.  How often during the last year have you found that you were not able to stop drinking once you had started? Never   5.  How often during the last year have you failed to do what was normally expected of you because of drinking? Never   6.  How often during the last year have you needed a first drink in the morning to get yourself going after a heavy drinking session? Never   7.  How often during the last year have you had a feeling of guilt or remorse after drinking? Never   8.  How often during the last year have you been unable to remember what happened the night before because of your drinking? Never   9.  Have you or someone else been injured because of your drinking? No   10. Has a relative, friend, doctor or other health care worker been concerned about your drinking or suggested you cut down? No   TOTAL SCORE 5       Reviewed orders with patient.  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, right     Past Surgical History:   Procedure Laterality Date     COLONOSCOPY  2010    4 mm polyps mid sigmoid and mid descending colon, repeat 5 years     DEXA  10/2010    T score lumbar 0.8, femoral neck -0.8/-0.2 with BMD 0.975     HC EXC MALIG SKIN LESION TRUNK/ARM/LEG <=0.5 CM  2005    excision basal cell right forearm     HC EXCISION BREAST LESION W XRAY MARKER, OPEN SINGLE  2010    Right breast excisional biopsy: Focal complex sclerosing lesion, focal columnar cell hyperplasia, adenosis with microcalcifications, no atypia     HC MRI CERVICAL SPINE W/O CONTRAST  10/2009    mulitilevel disc and facet joint denerative changes, mild spinal stenosis lower cervical levels, varying degrees of foraminal stenosis rt>lt (especially C4-5)     HC NCS MOTOR W/O F-WAVE, EACH NERVE  2009    mild left and very mild right median nerve neuropathy     HC REPAIR INTERMED, WOUND TRUNK/ARM/LEG 7.6-12 CM  1970s    complex closure right knee wound     MRA ANGIOGRAM BRAIN & MRI BRAIN W/O CONTRAST  2013    normal     SONO PELVIS COMPLETE  2013    2 small unterine myoma (12 and 14 mm) NOT approximating endometrium, 4  mm endoemtrial thickness       Social History     Tobacco Use     Smoking status: Former Smoker     Packs/day: 1.50     Years: 14.00     Pack years: 21.00     Types: Cigarettes     Start date: 6/15/1974     Quit date: 1988     Years since quittin.8     Smokeless tobacco: Never Used   Substance Use Topics     Alcohol use: Yes     Alcohol/week: 0.0 standard drinks     Comment: 0-3 beers a week     Family History   Problem Relation Age of Onset     Lipids Mother      Cancer Mother         several basal cell skin cancers     Heart Disease Mother         atrial fibrillation/pacemaker, rheumatic heart disease -  valve replacement     Gastrointestinal Disease Mother         GI bleeding from gastritis/diverticulitis     Hyperlipidemia Mother      Other Cancer Mother         skin cancer     Depression Mother      Thyroid Disease Mother      Alcohol/Drug Father      Respiratory Father         COPD,  66, smoker     Substance Abuse Father      Alzheimer Disease Maternal Grandmother         onset early 80s     Cancer - colorectal Maternal Grandfather          age 80     Colon Cancer Maternal Grandfather      Breast Cancer Maternal Aunt         onset early 60s     Blood Disease Sister         thrombophlebitis, negative factor testing     Hyperlipidemia Sister      Melanoma Sister      Connective Tissue Disorder Brother         bilateral hip replacement 40s for slipped capital epiphyses     Melanoma Brother      Neurologic Disorder Other         nieces/nephews with Tourette's     Osteoarthritis Sister         hands         Current Outpatient Medications   Medication Sig Dispense Refill     buPROPion (WELLBUTRIN XL) 300 MG 24 hr tablet TAKE 1 TABLET DAILY 90 tablet 1     Calcium-Vitamin D-Vitamin K 500-100-40 MG-UNT-MCG CHEW  90 tablet      docusate sodium (COLACE) 50 MG capsule        fexofenadine (ALLEGRA) 180 MG tablet Take 1 tablet by mouth daily. 90 tablet 3     gabapentin (NEURONTIN) 100 MG capsule Take 1 capsule (100 mg) by mouth 2 times daily 180 capsule 3     Multiple Vitamin (DAILY MULTIVITAMIN PO) Take 1 tablet by mouth daily       simvastatin (ZOCOR) 10 MG tablet Take 1 tablet (10 mg) by mouth At Bedtime 90 tablet 3     venlafaxine (EFFEXOR-XR) 75 MG 24 hr capsule TAKE 1 CAPSULE DAILY 90 capsule 1     SYNTHROID 88 MCG tablet Take 1 tablet (88 mcg) by mouth daily       Allergies   Allergen Reactions     Adhesive Tape Hives     Cats      Ragweeds        Mammogram Screening: Patient over age 50, mutual decision to screen reflected in health maintenance.    Pertinent mammograms are reviewed under the imaging  "tab.  History of abnormal Pap smear: NO - age 30-65 PAP every 5 years with negative HPV co-testing recommended  PAP / HPV Latest Ref Rng & Units 10/20/2020 10/9/2017 9/17/2015   PAP - NIL NIL NIL   HPV 16 DNA NEG:Negative Negative Negative Negative   HPV 18 DNA NEG:Negative Negative Negative Negative   OTHER HR HPV NEG:Negative Negative Negative Negative     Reviewed and updated as needed this visit by clinical staff  Tobacco  Allergies  Meds      Soc Hx        Reviewed and updated as needed this visit by Provider   Allergies                  Review of Systems  CONSTITUTIONAL: NEGATIVE for fever, chills, change in weight  INTEGUMENTARY/SKIN: NEGATIVE for worrisome rashes, moles or lesions  EYES: NEGATIVE for vision changes or irritation  ENT: NEGATIVE for ear, mouth and throat problems  RESP: NEGATIVE for significant cough or SOB  BREAST: NEGATIVE for masses, tenderness or discharge  CV: NEGATIVE for chest pain, palpitations or peripheral edema  GI: NEGATIVE for nausea, abdominal pain, heartburn, or change in bowel habits  : NEGATIVE for unusual urinary or vaginal symptoms. No vaginal bleeding.  MUSCULOSKELETAL: NEGATIVE for significant arthralgias or myalgia  NEURO: NEGATIVE for weakness, dizziness or paresthesias  PSYCHIATRIC: NEGATIVE for changes in mood or affect      OBJECTIVE:   /78   Pulse 83   Temp 97.3  F (36.3  C) (Tympanic)   Resp 14   Ht 1.575 m (5' 2\")   Wt 69.4 kg (153 lb)   LMP 08/18/2010   SpO2 98%   BMI 27.98 kg/m    Physical Exam  GENERAL APPEARANCE: healthy, alert and no distress  EYES: Eyes grossly normal to inspection, PERRL and conjunctivae and sclerae normal  HENT: ear canals and TM's normal, nose and mouth without ulcers or lesions, oropharynx clear and oral mucous membranes moist  NECK: no adenopathy, no asymmetry, masses, or scars and thyroid normal to palpation  RESP: lungs clear to auscultation - no rales, rhonchi or wheezes  BREAST: normal without masses, tenderness " or nipple discharge and no palpable axillary masses or adenopathy  CV: regular rate and rhythm, normal S1 S2, no S3 or S4, no murmur, click or rub, no peripheral edema and peripheral pulses strong  ABDOMEN: soft, nontender, no hepatosplenomegaly, no masses and bowel sounds normal   (female): normal female external genitalia, normal urethral meatus, vaginal mucosal atrophy noted, normal cervix, adnexae, and uterus without masses or abnormal discharge  MS: no musculoskeletal defects are noted and gait is age appropriate without ataxia  SKIN: no suspicious lesions or rashes  NEURO: Normal strength and tone, sensory exam grossly normal, mentation intact and speech normal  PSYCH: mentation appears normal and affect normal/bright        ASSESSMENT/PLAN:   1. Annual physical exam      2. Hot flash, menopausal  Patient is noticing benefit with her gabapentin.  Resume the medication.   refill on medication ordered.  - gabapentin (NEURONTIN) 100 MG capsule; Take 1 capsule (100 mg) by mouth 2 times daily  Dispense: 180 capsule; Refill: 3    3. Hyperlipidemia LDL goal <160  Resume simvastatin.  Patient is tolerating it well.  Future labs ordered.  With the results  - simvastatin (ZOCOR) 10 MG tablet; Take 1 tablet (10 mg) by mouth At Bedtime  Dispense: 90 tablet; Refill: 3  - Lipid panel reflex to direct LDL Fasting; Future  - Comprehensive metabolic panel; Future    4. Acquired hypothyroidism  Questionable control.  Thyroid function test ordered.  - TSH; Future    5. Cervical cancer screening    - Pap imaged thin layer screen with HPV - recommended age 30 - 65 years (select HPV order below)  - HPV High Risk Types DNA Cervical    6. Colon cancer screening    - GASTROENTEROLOGY ADULT REF PROCEDURE ONLY; Future    Patient has been advised of split billing requirements and indicates understanding:   COUNSELING:  Reviewed preventive health counseling, as reflected in patient instructions       Regular exercise       Healthy  "diet/nutrition    Estimated body mass index is 27.98 kg/m  as calculated from the following:    Height as of this encounter: 1.575 m (5' 2\").    Weight as of this encounter: 69.4 kg (153 lb).    Weight management plan: Discussed healthy diet and exercise guidelines    She reports that she quit smoking about 32 years ago. Her smoking use included cigarettes. She started smoking about 46 years ago. She has a 21.00 pack-year smoking history. She has never used smokeless tobacco.      Counseling Resources:  ATP IV Guidelines  Pooled Cohorts Equation Calculator  Breast Cancer Risk Calculator  BRCA-Related Cancer Risk Assessment: FHS-7 Tool  FRAX Risk Assessment  ICSI Preventive Guidelines  Dietary Guidelines for Americans, 2010  USDA's MyPlate  ASA Prophylaxis  Lung CA Screening    Donna Cheatham MD  [facility]  "

## 2020-10-21 ASSESSMENT — ANXIETY QUESTIONNAIRES: GAD7 TOTAL SCORE: 2

## 2020-10-22 DIAGNOSIS — E03.9 ACQUIRED HYPOTHYROIDISM: ICD-10-CM

## 2020-10-22 DIAGNOSIS — E78.5 HYPERLIPIDEMIA LDL GOAL <160: ICD-10-CM

## 2020-10-22 PROCEDURE — 80053 COMPREHEN METABOLIC PANEL: CPT | Performed by: FAMILY MEDICINE

## 2020-10-22 PROCEDURE — 80061 LIPID PANEL: CPT | Performed by: FAMILY MEDICINE

## 2020-10-22 PROCEDURE — 84443 ASSAY THYROID STIM HORMONE: CPT | Performed by: FAMILY MEDICINE

## 2020-10-23 LAB
ALBUMIN SERPL-MCNC: 3.8 G/DL (ref 3.4–5)
ALP SERPL-CCNC: 115 U/L (ref 40–150)
ALT SERPL W P-5'-P-CCNC: 25 U/L (ref 0–50)
ANION GAP SERPL CALCULATED.3IONS-SCNC: 6 MMOL/L (ref 3–14)
AST SERPL W P-5'-P-CCNC: 18 U/L (ref 0–45)
BILIRUB SERPL-MCNC: 0.5 MG/DL (ref 0.2–1.3)
BUN SERPL-MCNC: 18 MG/DL (ref 7–30)
CALCIUM SERPL-MCNC: 9.3 MG/DL (ref 8.5–10.1)
CHLORIDE SERPL-SCNC: 107 MMOL/L (ref 94–109)
CHOLEST SERPL-MCNC: 171 MG/DL
CO2 SERPL-SCNC: 26 MMOL/L (ref 20–32)
CREAT SERPL-MCNC: 0.66 MG/DL (ref 0.52–1.04)
GFR SERPL CREATININE-BSD FRML MDRD: >90 ML/MIN/{1.73_M2}
GLUCOSE SERPL-MCNC: 93 MG/DL (ref 70–99)
HDLC SERPL-MCNC: 53 MG/DL
LDLC SERPL CALC-MCNC: 84 MG/DL
NONHDLC SERPL-MCNC: 118 MG/DL
POTASSIUM SERPL-SCNC: 3.9 MMOL/L (ref 3.4–5.3)
PROT SERPL-MCNC: 7.8 G/DL (ref 6.8–8.8)
SODIUM SERPL-SCNC: 139 MMOL/L (ref 133–144)
TRIGL SERPL-MCNC: 168 MG/DL
TSH SERPL DL<=0.005 MIU/L-ACNC: 0.14 MU/L (ref 0.4–4)

## 2020-10-23 RX ORDER — LEVOTHYROXINE SODIUM 88 UG/1
88 TABLET ORAL DAILY
Qty: 90 TABLET | Refills: 1 | Status: SHIPPED | OUTPATIENT
Start: 2020-10-23 | End: 2020-10-28

## 2020-10-26 LAB
COPATH REPORT: NORMAL
PAP: NORMAL

## 2020-10-27 ENCOUNTER — TELEPHONE (OUTPATIENT)
Dept: FAMILY MEDICINE | Facility: CLINIC | Age: 60
End: 2020-10-27

## 2020-10-27 LAB
FINAL DIAGNOSIS: NORMAL
HPV HR 12 DNA CVX QL NAA+PROBE: NEGATIVE
HPV16 DNA SPEC QL NAA+PROBE: NEGATIVE
HPV18 DNA SPEC QL NAA+PROBE: NEGATIVE
SPECIMEN DESCRIPTION: NORMAL
SPECIMEN SOURCE CVX/VAG CYTO: NORMAL

## 2020-10-27 NOTE — TELEPHONE ENCOUNTER
"Left detailed message for patient that provider sent a \"note to pharmacy\" on 10/23/2020 to discontinue the 100 mcg levothyroxine, but clinic is unable to give refunds as it is up to the pharmacy.    Patient called clinic for clarification of message left.  Patient states she received levothyroxine 100 mcg from [facility] and dose changed.  After reviewing the chart, provider did not send levothyroxine to [facility] but a local pharmacy and to contact [facility].      Patient states she paid $120 for the 100 mcg levothyroxine and is not going to discard it so she has been cutting off a small piece of medication.     PAUL JonesN, RN  [facility] Triage    "

## 2020-10-27 NOTE — TELEPHONE ENCOUNTER
General Call:     Who is calling:  Patient    Reason for Call:  Incorrect Synthroid prescription was picked up from pharmacy. Patient is wondering if they can be refunded for levothyroxine (SYNTHROID) 100 MCG tablet. They needed the 88 MCG tablet that was also sent to the pharmacy.    Date of last appointment with provider: 10/20    Okay to leave a detailed message:[facility] at Home number on file [phone] (home)

## 2020-10-27 NOTE — TELEPHONE ENCOUNTER
Pt calling back states she spoke with [facility] and they received 3 rxs on 10/20 at 1748 for gabapentin, synthroid 100 mcg, and simvastatin.  They have a message ID # [id number] to confirm they received all 3 rxs.  Pt states a 30 day supply of levothyroxine was to go [facility] (which is what shows in chart) on 10/20, then on 10/23 rx for levothyroxine 88 mcg was [facility] with a note to cancel the 100 mcg rx from 10/20.      Pt received a package from [facility] for 3 months of levothyroxine 100 mg on 10/24.  Pt states she has paid $125.00 and cannot afford another $125.00 for a new rx.  Pt has been cutting a little bit off the 100 mcg pills she has and the pharmacist from [facility] advised that is not a good idea.  Pt wondering if this was a mistake on her part or md.    See note below also.    Brook NEWMAN RN  EP Triage

## 2020-10-28 NOTE — TELEPHONE ENCOUNTER
Typically on annual physical examination all medications are renewed for a year supply.  After blood work was received her dose needed to be adjusted therefore the lower dose of the medication was ordered to the pharmacy.    Her thyroid function test shows that she was being over replaced with using 100 mcg of levothyroxine.  Therefore the dose was decreased to 88 mcg.    I really do not think 3 months worth of thyroid medication would cost $125.  Please have her verify the cost of levothyroxine from [facility] pharmacy to see if they would be able to offer her better pricing.    Donna Cheatham MD  [facility], Vonda Winkler

## 2020-10-28 NOTE — TELEPHONE ENCOUNTER
Aylin called to leave message for the clinic. States they sent in the invoice and medication information via [facility]. Was just leaving an FYI for the clinic.    Katia Hernandes  Patient Representative  [facility]

## 2020-10-29 ENCOUNTER — PATIENT OUTREACH (OUTPATIENT)
Dept: CARE COORDINATION | Facility: CLINIC | Age: 60
End: 2020-10-29

## 2020-10-29 NOTE — PROGRESS NOTES
" Contact   Chart Review     Situation: Patient chart reviewed by .    Background: VM left by BRIAN Anthony with [facility]. She talked to patient today who has a \"quality of care\" issue with the amount of medication being prescribed. She feels it is the incorrect dosage.  Raj requests to have care coordinator call patient to discuss.          Assessment: Writer is not supporting this clinic at this time.   Will route to current [facility] for follow up.  Patient not enrolled in care coordination.     Plan/Recommendations: No further outreach by this [facility].     Zari Miller [facility]  [phone]    Clinic Care Coordination Contact    Situation: Patient chart reviewed by care coordinator.    Background: VM left by patient asking for a call back.  Writer discussed patient concerns with  CC who is supporting this clinic.  Agreed that patient should discuss any medication problems with her PCP who prescribes them.     Assessment: Called patient and discussed her concern.  Explained writer's role and that this is a matter between herself and her PCP. She feels PCP is prescribing her the wrong dose of her thyroid medication.  She thought that Raj was leaving a message on someone's phone that could escalate her concerns.  She is considering finding a new clinic and new PCP outside of [facility].  Explained that she could also call the clinic and ask to speak to the clinic manager.  She may do that.      Plan/Recommendations: No further outreach by [facility].     Zari Miller [facility]  [phone]        "

## 2020-11-08 DIAGNOSIS — Z11.59 ENCOUNTER FOR SCREENING FOR OTHER VIRAL DISEASES: Primary | ICD-10-CM

## 2020-11-29 DIAGNOSIS — Z11.59 ENCOUNTER FOR SCREENING FOR OTHER VIRAL DISEASES: Primary | ICD-10-CM

## 2021-01-07 DIAGNOSIS — F32.1 MAJOR DEPRESSIVE DISORDER, SINGLE EPISODE, MODERATE (H): ICD-10-CM

## 2021-01-07 DIAGNOSIS — F41.9 ANXIETY: ICD-10-CM

## 2021-01-08 DIAGNOSIS — Z11.59 ENCOUNTER FOR SCREENING FOR OTHER VIRAL DISEASES: ICD-10-CM

## 2021-01-08 LAB
SARS-COV-2 RNA RESP QL NAA+PROBE: NORMAL
SPECIMEN SOURCE: NORMAL

## 2021-01-08 PROCEDURE — U0003 INFECTIOUS AGENT DETECTION BY NUCLEIC ACID (DNA OR RNA); SEVERE ACUTE RESPIRATORY SYNDROME CORONAVIRUS 2 (SARS-COV-2) (CORONAVIRUS DISEASE [COVID-19]), AMPLIFIED PROBE TECHNIQUE, MAKING USE OF HIGH THROUGHPUT TECHNOLOGIES AS DESCRIBED BY CMS-2020-01-R: HCPCS | Performed by: COLON & RECTAL SURGERY

## 2021-01-08 PROCEDURE — U0005 INFEC AGEN DETEC AMPLI PROBE: HCPCS | Performed by: COLON & RECTAL SURGERY

## 2021-01-08 RX ORDER — VENLAFAXINE HYDROCHLORIDE 75 MG/1
CAPSULE, EXTENDED RELEASE ORAL
Qty: 90 CAPSULE | Refills: 0 | Status: SHIPPED | OUTPATIENT
Start: 2021-01-08

## 2021-01-08 NOTE — TELEPHONE ENCOUNTER
Prescription approved per [facility] Refill Protocol.    Brook NEWMAN RN  EP Triage

## 2021-01-09 LAB
LABORATORY COMMENT REPORT: NORMAL
SARS-COV-2 RNA RESP QL NAA+PROBE: NEGATIVE
SPECIMEN SOURCE: NORMAL

## 2021-01-10 NOTE — H&P
Colon & Rectal Surgery History and Physical  Pre-Endoscopy Procedure Note    History of Present Illness   I have been asked by Dr. Cheatham to evaluate this 61 year old female for colorectal polyp surveillance. She has a personal history of adenomatous polyps and had an adenomatous polyp removed during her last colonoscopy in October 2015.  She currently denies any abdominal pain, weight loss, bleeding per rectum, or recent change in bowel habits.    Past Medical History  Diagnosis Date     Allergic rhinitis     cats, grasses      Anxiety      Cancer     basal cell carcinoma     Chronic idiopathic urticaria 2001/2010    eval with pos thyroid AB/JUAN M weakly pos/ESR 26/borderline low CH50, treated with doxepin, Singulair.  Cetirizine used for 2010 flare     Flat epithelial atypia of breast 12/2009    Flat epithelial atypia and proliferative fibrocystic change - excisional bx     Hashimoto's thyroiditis     with positive thyroid antibodies     Herpes zoster 6/2012     Hypothyroidism 1994     Idiopathic angioedema 2001, 2011    with urticaria     Iron deficiency anemia 2009    mild     Major depressive disorder, single episode, moderate 1977    depression with anxiety     Mixed hyperlipidemia 2006    started statin 1/2013     Osteoarthritis cervical spine 2003    C5/6/7     Personal history of colonic polyps 2010    tubular adenoma     Sleep apnea        Past Surgical History  Procedure Laterality Date     COLONOSCOPY  8/2010    4 mm polyps mid sigmoid and mid descending colon, repeat 5 years     DEXA  10/2010    T score lumbar 0.8, femoral neck -0.8/-0.2 with BMD 0.975     EXC MALIG SKIN LESION TRUNK/ARM/LEG <=0.5 CM  4/2005    excision basal cell right forearm     EXCISION BREAST LESION W XRAY MARKER, OPEN SINGLE  2/2010    Right breast excisional biopsy: Focal complex sclerosing lesion, focal columnar cell hyperplasia, adenosis with microcalcifications, no atypia     MRI CERVICAL SPINE W/O CONTRAST  10/2009     mulitilevel disc and facet joint denerative changes, mild spinal stenosis lower cervical levels, varying degrees of foraminal stenosis rt>lt (especially C4-5)     NCS MOTOR W/O F-WAVE, EACH NERVE  12/2009    mild left and very mild right median nerve neuropathy     REPAIR INTERMED, WOUND TRUNK/ARM/LEG 7.6-12 CM  1970s    complex closure right knee wound     MRA ANGIOGRAM BRAIN & MRI BRAIN W/O CONTRAST  4/2013    normal     SONO PELVIS COMPLETE  9/2013    2 small unterine myoma (12 and 14 mm) NOT approximating endometrium, 4  mm endoemtrial thickness        Medications  Medication Sig     buPROPion (WELLBUTRIN XL) 300 MG 24 hr tablet TAKE 1 TABLET DAILY     Calcium-Vitamin D-Vitamin K 500-100-40 MG-UNT-MCG CHEW      docusate sodium (COLACE) 50 MG capsule      fexofenadine (ALLEGRA) 180 MG tablet Take 1 tablet by mouth daily.     gabapentin (NEURONTIN) 100 MG capsule Take 1 capsule (100 mg) by mouth 2 times daily     Multiple Vitamin (DAILY MULTIVITAMIN PO) Take 1 tablet by mouth daily     simvastatin (ZOCOR) 10 MG tablet Take 1 tablet (10 mg) by mouth At Bedtime     SYNTHROID 88 MCG tablet Take 1 tablet (88 mcg) by mouth daily     venlafaxine (EFFEXOR-XR) 75 MG 24 hr capsule TAKE 1 CAPSULE DAILY       Allergies  Allergen Reactions     Adhesive Tape Hives     Cats      Ragweeds         Family History   Family history includes Alcohol/Drug Use in her father; Alzheimer Disease in her maternal grandmother; Blood Disease in her sister; Breast Cancer in her maternal aunt; Cancer in her mother; Cancer - colorectal in her maternal grandfather; Connective Tissue Disorder in her brother; Depression in her mother; Gastrointestinal Disease in her mother; Heart Disease in her mother; Hyperlipidemia in her mother and sister; Melanoma in her brother and sister; Neurologic Disorder in an other family member; Osteoarthritis in her sister; Respiratory Disorder in her father; Substance Abuse in her father; Thyroid Disease in her  "mother.     Social History   She reports that she quit smoking about 33 years ago. Her smoking use included cigarettes. She started smoking about 46 years ago. She has a 21.00 pack-year smoking history. She has never used smokeless tobacco. She reports current alcohol use. She reports that she does not use drugs.    Review of Systems   Constitutional:  No fever, weight change or fatigue.    Eyes:     No dry eyes or vision changes.   Ears/Nose/Throat/Neck:  No oral ulcers, sore throat or voice change.    Cardiovascular:   No palpitations, syncope, angina or edema.   Respiratory:    No chest pain, excessive sleepiness, shortness of breath or hemoptysis.    Gastrointestinal:   No abdominal pain, nausea, vomiting, diarrhea or heartburn.    Genitourinary:   No dysuria, hematuria, urinary retention or urinary frequency.   Musculoskeletal:  No joint swelling or arthralgias.    Dermatologic:  No skin rash or other skin changes.   Neurologic:    No focal weakness or numbness. No neuropathy.   Psychiatric:    No depression, anxiety, suicidal ideation, or paranoid ideation.   Endocrine:   No cold or heat intolerance, polydipsia, hirsutism, change in libido, or flushing.   Hematology/Lymphatic:  No bleeding or lymphadenopathy.    Allergy/Immunology:  No rhinitis or hives.     Physical Exam   Vitals:  BP (!) 141/86, HR 83, RR 12, temperature 98.2  F (36.8  C), temperature source Temporal, height 1.594 m (5' 2.75\"), weight 68.9 kg (152 lb), last menstrual period 08/18/2010, SpO2 95 %, not currently breastfeeding.    General:  Alert and oriented to person, place and time   Airway: Normal oropharyngeal airway and neck mobility   Lungs:  Clear bilaterally   Heart:  Regular rate and rhythm   Abdomen: Soft, NT, ND, no masses   Extremities: Warm, good capillary refill    ASA Grade: II (mild systemic disease)    Impression: Cleared for use of conscious sedation for colorectal polyp surveillance    Plan: Proceed with colonoscopy "     Naz Horne MD  [facility]  [phone]

## 2021-01-11 ENCOUNTER — HOSPITAL ENCOUNTER (OUTPATIENT)
Facility: CLINIC | Age: 61
Discharge: HOME | End: 2021-01-11
Attending: COLON & RECTAL SURGERY | Admitting: COLON & RECTAL SURGERY
Payer: COMMERCIAL

## 2021-01-11 VITALS
TEMPERATURE: 98.2 F | OXYGEN SATURATION: 100 % | RESPIRATION RATE: 19 BRPM | WEIGHT: 152 LBS | DIASTOLIC BLOOD PRESSURE: 87 MMHG | BODY MASS INDEX: 26.93 KG/M2 | HEART RATE: 74 BPM | HEIGHT: 63 IN | SYSTOLIC BLOOD PRESSURE: 117 MMHG

## 2021-01-11 LAB — COLONOSCOPY: NORMAL

## 2021-01-11 PROCEDURE — 99153 MOD SED SAME PHYS/QHP EA: CPT | Performed by: COLON & RECTAL SURGERY

## 2021-01-11 PROCEDURE — G0500 MOD SEDAT ENDO SERVICE >5YRS: HCPCS | Performed by: COLON & RECTAL SURGERY

## 2021-01-11 PROCEDURE — 45385 COLONOSCOPY W/LESION REMOVAL: CPT | Mod: PT | Performed by: COLON & RECTAL SURGERY

## 2021-01-11 PROCEDURE — 250N000011 HC RX IP 250 OP 636: Performed by: COLON & RECTAL SURGERY

## 2021-01-11 PROCEDURE — 88305 TISSUE EXAM BY PATHOLOGIST: CPT | Mod: TC | Performed by: COLON & RECTAL SURGERY

## 2021-01-11 PROCEDURE — 88305 TISSUE EXAM BY PATHOLOGIST: CPT | Mod: 26 | Performed by: PATHOLOGY

## 2021-01-11 RX ORDER — FENTANYL CITRATE 50 UG/ML
INJECTION, SOLUTION INTRAMUSCULAR; INTRAVENOUS PRN
Status: DISCONTINUED | OUTPATIENT
Start: 2021-01-11 | End: 2021-01-11 | Stop reason: HOSPADM

## 2021-01-11 ASSESSMENT — MIFFLIN-ST. JEOR: SCORE: 1219.63

## 2021-01-13 LAB — COPATH REPORT: NORMAL

## 2021-01-24 DIAGNOSIS — F32.1 MAJOR DEPRESSIVE DISORDER, SINGLE EPISODE, MODERATE (H): ICD-10-CM

## 2021-01-25 RX ORDER — BUPROPION HYDROCHLORIDE 300 MG/1
TABLET ORAL
Qty: 90 TABLET | Refills: 0 | Status: SHIPPED | OUTPATIENT
Start: 2021-01-25

## 2021-08-25 ASSESSMENT — SLEEP AND FATIGUE QUESTIONNAIRES
HOW LIKELY ARE YOU TO NOD OFF OR FALL ASLEEP IN A CAR, WHILE STOPPED FOR A FEW MINUTES IN TRAFFIC: WOULD NEVER DOZE
HOW LIKELY ARE YOU TO NOD OFF OR FALL ASLEEP WHILE SITTING AND TALKING TO SOMEONE: WOULD NEVER DOZE
HOW LIKELY ARE YOU TO NOD OFF OR FALL ASLEEP WHILE SITTING INACTIVE IN A PUBLIC PLACE: SLIGHT CHANCE OF DOZING
HOW LIKELY ARE YOU TO NOD OFF OR FALL ASLEEP WHILE SITTING AND READING: SLIGHT CHANCE OF DOZING
HOW LIKELY ARE YOU TO NOD OFF OR FALL ASLEEP WHEN YOU ARE A PASSENGER IN A CAR FOR AN HOUR WITHOUT A BREAK: SLIGHT CHANCE OF DOZING
HOW LIKELY ARE YOU TO NOD OFF OR FALL ASLEEP WHILE LYING DOWN TO REST IN THE AFTERNOON WHEN CIRCUMSTANCES PERMIT: HIGH CHANCE OF DOZING
HOW LIKELY ARE YOU TO NOD OFF OR FALL ASLEEP WHILE SITTING QUIETLY AFTER LUNCH WITHOUT ALCOHOL: SLIGHT CHANCE OF DOZING
HOW LIKELY ARE YOU TO NOD OFF OR FALL ASLEEP WHILE WATCHING TV: SLIGHT CHANCE OF DOZING

## 2021-08-25 NOTE — PROGRESS NOTES
[facility]   Outpatient Sleep Medicine  Aug 26, 2021       Name: Allie Thao MRN# [id number]   Age: 61 year old YOB: 1960            Assessment and Plan:   1. ANIRUDH (obstructive sleep apnea)  Patient's sleep apnea is moderately well treated with current PAP settings [id number] with residual AHI of 7.09 events per hour. Compliance is adequate though somewhat low nightly use as of recently secondary to ill fitting mask. Primary reason for today's visit was to obtain prescription for new mask/supplies and this was provided today. Pressure settings were increased slightly to [id number] today to help with residual events. Will continue nightly use.   - Comprehensive DME    2. Elevated blood pressure reading in office without diagnosis of hypertension  Elevated on check x2 today. Encouraged her to keep an eye on this and consider discussion with PCP.     Allie Thao will follow up in about 1 year for annual visit, or sooner as needed.        Chief Complaint      Chief Complaint   Patient presents with     CPAP Follow Up            History of Present Illness:     Allie Thao is a 61 year old female who presents to the clinic for follow-up of their severe obstructive sleep apnea treated with CPAP. She was last seen by Dr. Luevano on 12/14/2018.     She was diagnosed with severe sleep apnea via home sleep test completed 12/6/2018 at weight 154#, BMI 27.7 - AHI of 30.6 events per hour, supine AHI 33.2, oxygen bessy 76%, 13.2 minutes spent less than equal to 88%.    She was set up with CPAP 6-15 cm H2O on 12/31/2018.    Primary reason for today's visit is to update prescription for mask/supplies.  Reports that her current headgear is very worn out and the mask is slipping off of her face at night.  When she tried to get new supplies [facility] informed her that new prescription was needed.  No specific concerns or complaints today. Patient is using a nasal pillow mask. The mask is  "comfortable. The mask is not leaking. They are not snoring with the mask on. They are not having gasp/snort arousals.  They are not having significant nasal dryness or epistaxis but can wake with dry mouth.  They are not having pain/skin breakdown. The pressure settings are comfortable.     Patient recently lost her job at [employer].  Reports that this is good for her as her job was causing a lot of anxiety and stress in her life.  Now that she does not have to get up for work she is keeping a delayed schedule with typical bedtime between 1-2:00 AM and wakes between 830-10:30 AM.    Improvements noted with CPAP include \"more peaceful\" sleep, less nightmares, waking feeling rested in AM.     ResMed Auto-PAP 6.0 - 15.0 cmH2O 30 day usage data:    60% of days with > 4 hours of use. 1/30 days with no use.   Average use 276 minutes per day.   95%ile Leak 21.4 L/min.   CPAP 95% pressure 12.1 cm.   AHI 7.09 events per hour.     SCALES:   INSOMNIA: Insomnia Severity Score: 6   SLEEPINESS: [place] Sleepiness Score: 8    Past medical/surgical history, family history, social history, medications and allergies were reviewed.           Physical Examination:   BP (!) 142/88   Pulse 77   Resp 16   Ht 1.588 m (5' 2.5\")   Wt 69.9 kg (154 lb)   LMP 08/18/2010   SpO2 97%   BMI 27.72 kg/m     BP (!) 143/91   Pulse 77   Resp 16   Ht 1.588 m (5' 2.5\")   Wt 69.9 kg (154 lb)   LMP 08/18/2010   SpO2 97%   BMI 27.72 kg/m    General appearance: Awake, alert, cooperative. Well groomed. Sitting comfortably in chair. In no apparent distress.  HEENT: Head: Normocephalic, atraumatic. Eyes:Conjunctiva clear. Sclera normal.  Patient wore mask for entire visit covering remaining parts of face.  Pulmonary:  Able to speak easily in full sentences 8. No cough or wheeze.   Skin:  No rashes or significant lesions on visible skin.   Neurologic: Alert, oriented x3.   Psychiatric: Mood euthymic. Affect congruent with full range and " intensity.    CC:  Donna Cheatham PA-C  Aug 26, 2021     [facility] [place]  [address] , [place] [address]     [facility]  [address] [address]    Chart documentation was completed, in part, with [employer] voice-recognition software. Even though reviewed, some grammatical, spelling, and word errors may remain.    38 minutes spent on day of encounter doing chart review, history and exam, documentation, and further activities as noted above

## 2021-08-26 ENCOUNTER — OFFICE VISIT (OUTPATIENT)
Dept: SLEEP MEDICINE | Facility: CLINIC | Age: 61
End: 2021-08-26
Payer: COMMERCIAL

## 2021-08-26 VITALS
DIASTOLIC BLOOD PRESSURE: 91 MMHG | HEIGHT: 63 IN | BODY MASS INDEX: 27.29 KG/M2 | OXYGEN SATURATION: 97 % | HEART RATE: 77 BPM | WEIGHT: 154 LBS | SYSTOLIC BLOOD PRESSURE: 143 MMHG | RESPIRATION RATE: 16 BRPM

## 2021-08-26 DIAGNOSIS — G47.33 OSA (OBSTRUCTIVE SLEEP APNEA): Primary | ICD-10-CM

## 2021-08-26 DIAGNOSIS — R03.0 ELEVATED BLOOD PRESSURE READING IN OFFICE WITHOUT DIAGNOSIS OF HYPERTENSION: ICD-10-CM

## 2021-08-26 PROCEDURE — 99214 OFFICE O/P EST MOD 30 MIN: CPT | Performed by: PHYSICIAN ASSISTANT

## 2021-08-26 RX ORDER — DOCUSATE SODIUM 100 MG/1
CAPSULE, LIQUID FILLED ORAL
COMMUNITY
Start: 2021-08-16

## 2021-08-26 ASSESSMENT — MIFFLIN-ST. JEOR: SCORE: 1224.73

## 2021-08-26 NOTE — PATIENT INSTRUCTIONS
Your BMI is Body mass index is 27.72 kg/m .  Weight management is a personal decision.  If you are interested in exploring weight loss strategies, the following discussion covers the approaches that may be successful. Body mass index (BMI) is one way to tell whether you are at a healthy weight, overweight, or obese. It measures your weight in relation to your height.  A BMI of 18.5 to 24.9 is in the healthy range. A person with a BMI of 25 to 29.9 is considered overweight, and someone with a BMI of 30 or greater is considered obese. More than two-thirds of American adults are considered overweight or obese.  Being overweight or obese increases the risk for further weight gain. Excess weight may lead to heart disease and diabetes.  Creating and following plans for healthy eating and physical activity may help you improve your health.  Weight control is part of healthy lifestyle and includes exercise, emotional health, and healthy eating habits. Careful eating habits lifelong are the mainstay of weight control. Though there are significant health benefits from weight loss, long-term weight loss with diet alone may be very difficult to achieve- studies show long-term success with dietary management in less than 10% of people. Attaining a healthy weight may be especially difficult to achieve in those with severe obesity. In some cases, medications, devices and surgical management might be considered.  What can you do?  If you are overweight or obese and are interested in methods for weight loss, you should discuss this with your provider.     Consider reducing daily calorie intake by 500 calories.     Keep a food journal.     Avoiding skipping meals, consider cutting portions instead.    Diet combined with exercise helps maintain muscle while optimizing fat loss. Strength training is particularly important for building and maintaining muscle mass. Exercise helps reduce stress, increase energy, and improves fitness.  Increasing exercise without diet control, however, may not burn enough calories to loose weight.       Start walking three days a week 10-20 minutes at a time    Work towards walking thirty minutes five days a week     Eventually, increase the speed of your walking for 1-2 minutes at time    In addition, we recommend that you review healthy lifestyles and methods for weight loss available through the National Institutes of Health patient information sites:  http://win.niddk.nih.gov/publications/index.htm    And look into health and wellness programs that may be available through your health insurance provider, employer, local community center, or davian club.    Weight management plan: Patient was referred to their PCP to discuss a diet and exercise plan.

## 2021-08-26 NOTE — NURSING NOTE
"Chief Complaint   Patient presents with     CPAP Follow Up       Initial BP (!) 142/88   Pulse 77   Resp 16   Ht 1.588 m (5' 2.5\")   Wt 69.9 kg (154 lb)   LMP 08/18/2010   SpO2 97%   BMI 27.72 kg/m   Estimated body mass index is 27.72 kg/m  as calculated from the following:    Height as of this encounter: 1.588 m (5' 2.5\").    Weight as of this encounter: 69.9 kg (154 lb).    Medication Reconciliation: complete  ESS 8  Valerie Lance, CATHY  "

## 2021-09-01 ENCOUNTER — TELEPHONE (OUTPATIENT)
Dept: SLEEP MEDICINE | Facility: CLINIC | Age: 61
End: 2021-09-01

## 2021-09-01 DIAGNOSIS — G47.33 OSA (OBSTRUCTIVE SLEEP APNEA): ICD-10-CM

## 2021-11-08 ENCOUNTER — HOSPITAL ENCOUNTER (OUTPATIENT)
Dept: MAMMOGRAPHY | Facility: CLINIC | Age: 61
Discharge: HOME | End: 2021-11-08
Attending: FAMILY MEDICINE | Admitting: FAMILY MEDICINE
Payer: COMMERCIAL

## 2021-11-08 DIAGNOSIS — Z12.31 VISIT FOR SCREENING MAMMOGRAM: ICD-10-CM

## 2021-11-08 PROCEDURE — 77063 BREAST TOMOSYNTHESIS BI: CPT

## 2021-12-18 ENCOUNTER — HEALTH MAINTENANCE LETTER (OUTPATIENT)
Age: 61
End: 2021-12-18

## 2022-08-23 NOTE — TELEPHONE ENCOUNTER
Routing refill request to provider for review/approval because:  Labs out of range:  phq9  Patient is seeing psychology.    PAUL JonesN, RN  [employer] [facility]           Discharged

## 2022-10-10 ENCOUNTER — HEALTH MAINTENANCE LETTER (OUTPATIENT)
Age: 62
End: 2022-10-10

## 2022-11-09 ENCOUNTER — HOSPITAL ENCOUNTER (OUTPATIENT)
Dept: MAMMOGRAPHY | Facility: CLINIC | Age: 62
Discharge: HOME | End: 2022-11-09
Attending: FAMILY MEDICINE | Admitting: FAMILY MEDICINE
Payer: COMMERCIAL

## 2022-11-09 DIAGNOSIS — Z12.31 VISIT FOR SCREENING MAMMOGRAM: ICD-10-CM

## 2022-11-09 PROCEDURE — 77067 SCR MAMMO BI INCL CAD: CPT

## 2022-11-27 ENCOUNTER — HEALTH MAINTENANCE LETTER (OUTPATIENT)
Age: 62
End: 2022-11-27

## 2022-12-15 ENCOUNTER — HOSPITAL ENCOUNTER (OUTPATIENT)
Facility: CLINIC | Age: 62
Discharge: HOME | End: 2022-12-15
Attending: COLON & RECTAL SURGERY | Admitting: COLON & RECTAL SURGERY
Payer: COMMERCIAL

## 2022-12-15 VITALS
HEART RATE: 99 BPM | OXYGEN SATURATION: 97 % | DIASTOLIC BLOOD PRESSURE: 82 MMHG | WEIGHT: 150 LBS | RESPIRATION RATE: 18 BRPM | HEIGHT: 62 IN | SYSTOLIC BLOOD PRESSURE: 130 MMHG | BODY MASS INDEX: 27.6 KG/M2

## 2022-12-15 LAB — COLONOSCOPY: NORMAL

## 2022-12-15 PROCEDURE — 88305 TISSUE EXAM BY PATHOLOGIST: CPT | Mod: TC | Performed by: COLON & RECTAL SURGERY

## 2022-12-15 PROCEDURE — 88305 TISSUE EXAM BY PATHOLOGIST: CPT | Mod: 26 | Performed by: PATHOLOGY

## 2022-12-15 PROCEDURE — 250N000011 HC RX IP 250 OP 636: Performed by: COLON & RECTAL SURGERY

## 2022-12-15 PROCEDURE — G0500 MOD SEDAT ENDO SERVICE >5YRS: HCPCS | Performed by: COLON & RECTAL SURGERY

## 2022-12-15 PROCEDURE — 45385 COLONOSCOPY W/LESION REMOVAL: CPT | Mod: PT | Performed by: COLON & RECTAL SURGERY

## 2022-12-15 ASSESSMENT — ACTIVITIES OF DAILY LIVING (ADL)
ADLS_ACUITY_SCORE: 35
ADLS_ACUITY_SCORE: 35

## 2022-12-15 NOTE — H&P
Colon & Rectal Surgery History and Physical  Pre-Endoscopy Procedure Note    History of Present Illness   I have been asked by Dr. Garrett to evaluate this 62 year old female for colorectal polyp surveillance. She currently denies any abdominal pain, weight loss, bleeding per rectum, or recent change in bowel habits.    Past Medical History  Diagnosis Date     Allergic rhinitis     cats, grasses      Anxiety      Cancer     basal cell carcinoma     Chronic idiopathic urticaria 2001/2010    eval with pos thyroid AB/JUAN M weakly pos/ESR 26/borderline low CH50, treated with doxepin, Singulair.  Cetirizine used for 2010 flare     Flat epithelial atypia of breast 12/2009    Flat epithelial atypia and proliferative fibrocystic change - excisional bx     Hashimoto's thyroiditis     with positive thyroid antibodies     Herpes zoster 6/2012    painless rash on back, ? dx at work clinic     Hypothyroidism 1994     Idiopathic angioedema 2001, 2011    with urticaria     Iron defic anemia 2009    mild     Major depressive disorder, single episode, moderate 1977    depression with anxiety     Mixed hyperlipidemia 2006    started statin 1/2013     Osteoarthritis cervical spine 2003    C5/6/7     Personal history of colonic polyps 2010    tubular adenoma     Sleep apnea        Past Surgical History  Procedure Laterality Date     COLONOSCOPY  8/2010    4 mm polyps mid sigmoid and mid descending colon, repeat 5 years     EXC MALIG SKIN LESION TRUNK/ARM/LEG <=0.5 CM  4/2005    excision basal cell right forearm     EXCISION BREAST LESION W XRAY MARKER, OPEN SINGLE  2/2010    Right breast excisional biopsy: Focal complex sclerosing lesion, focal columnar cell hyperplasia, adenosis with microcalcifications, no atypia     REPAIR INTERMED, WOUND TRUNK/ARM/LEG 7.6-12 CM  1970s    complex closure right knee wound        Medications  Medication Sig     buPROPion (WELLBUTRIN XL) 300 MG 24 hr tablet TAKE 1 TABLET DAILY     Calcium-Vitamin  D-Vitamin K 500-100-40 MG-UNT-MCG CHEW      docusate sodium (COLACE) 100 MG capsule      fexofenadine (ALLEGRA) 180 MG tablet Take 1 tablet by mouth daily.     gabapentin (NEURONTIN) 100 MG capsule Take 1 capsule (100 mg) by mouth 2 times daily (Patient taking differently: Take 100 mg by mouth daily)     Multiple Vitamin (DAILY MULTIVITAMIN PO) Take 1 tablet by mouth daily     simvastatin (ZOCOR) 10 MG tablet Take 1 tablet (10 mg) by mouth At Bedtime     SYNTHROID 88 MCG tablet Take 1 tablet (88 mcg) by mouth daily     venlafaxine (EFFEXOR-XR) 75 MG 24 hr capsule TAKE 1 CAPSULE DAILY       Allergies  Allergen Reactions     Adhesive Tape Hives     Cats      Ragweeds         Family History   Family history includes Alcohol/Drug Use in her father; Alzheimer Disease in her maternal grandmother; Blood Disease in her sister; Breast Cancer in her maternal aunt; Cancer in her mother; Cancer - colorectal in her maternal grandfather; Connective Tissue Disorder in her brother; Depression in her mother; Gastrointestinal Disease in her mother; Heart Disease in her mother; Hyperlipidemia in her mother and sister; Melanoma in her brother and sister; Neurologic Disorder in an other family member; Osteoarthritis in her sister; Other Cancer in her mother; Respiratory Disorder in her father; Thyroid Disease in her mother.     Social History   She reports that she quit smoking about 34 years ago. Her smoking use included cigarettes. She started smoking about 48 years ago. She has a 21.00 pack-year smoking history. She has never used smokeless tobacco. She reports current alcohol use. She reports that she does not use drugs.    Review of Systems   Constitutional:  No fever, weight change or fatigue.    Eyes:     No dry eyes or vision changes.   Ears/Nose/Throat/Neck:  No oral ulcers, sore throat or voice change.    Cardiovascular:   No palpitations, syncope, angina or edema.   Respiratory:    No chest pain, excessive sleepiness,  "shortness of breath or hemoptysis.    Gastrointestinal:   No abdominal pain, nausea, vomiting, diarrhea or heartburn.    Genitourinary:   No dysuria, hematuria, urinary retention or urinary frequency.   Musculoskeletal:  No joint swelling or arthralgias.    Dermatologic:  No skin rash or other skin changes.   Neurologic:    No focal weakness or numbness. No neuropathy.   Psychiatric:    No depression, anxiety, suicidal ideation, or paranoid ideation.   Endocrine:   No cold or heat intolerance, polydipsia, hirsutism, change in libido, or flushing.   Hematology/Lymphatic:  No bleeding or lymphadenopathy.    Allergy/Immunology:  No rhinitis or hives.     Physical Exam   Vitals:  /85, HR 70, RR 40, height 1.575 m (5' 2\"), weight 68 kg (150 lb), last menstrual period 08/18/2010, not currently breastfeeding.    General:  Alert and oriented to person, place and time   Airway: Normal oropharyngeal airway and neck mobility   Lungs:  Clear bilaterally   Heart:  Regular rate and rhythm   Abdomen: Soft, NT, ND, no masses   Extremities: Warm, good capillary refill    ASA Grade: II (mild systemic disease)    Impression: Cleared for use of conscious sedation for colorectal polyp surveillance    Plan: Proceed with colonoscopy     Naz Horne MD  [facility]  [phone]  "

## 2022-12-16 LAB
PATH REPORT.COMMENTS IMP SPEC: NORMAL
PATH REPORT.COMMENTS IMP SPEC: NORMAL
PATH REPORT.FINAL DX SPEC: NORMAL
PATH REPORT.GROSS SPEC: NORMAL
PATH REPORT.MICROSCOPIC SPEC OTHER STN: NORMAL
PATH REPORT.RELEVANT HX SPEC: NORMAL
PHOTO IMAGE: NORMAL

## 2023-01-04 ASSESSMENT — SLEEP AND FATIGUE QUESTIONNAIRES
HOW LIKELY ARE YOU TO NOD OFF OR FALL ASLEEP IN A CAR, WHILE STOPPED FOR A FEW MINUTES IN TRAFFIC: WOULD NEVER DOZE
HOW LIKELY ARE YOU TO NOD OFF OR FALL ASLEEP WHILE SITTING AND READING: SLIGHT CHANCE OF DOZING
HOW LIKELY ARE YOU TO NOD OFF OR FALL ASLEEP WHILE SITTING AND TALKING TO SOMEONE: WOULD NEVER DOZE
HOW LIKELY ARE YOU TO NOD OFF OR FALL ASLEEP WHILE LYING DOWN TO REST IN THE AFTERNOON WHEN CIRCUMSTANCES PERMIT: HIGH CHANCE OF DOZING
HOW LIKELY ARE YOU TO NOD OFF OR FALL ASLEEP WHILE SITTING QUIETLY AFTER LUNCH WITHOUT ALCOHOL: SLIGHT CHANCE OF DOZING
HOW LIKELY ARE YOU TO NOD OFF OR FALL ASLEEP WHILE WATCHING TV: SLIGHT CHANCE OF DOZING
HOW LIKELY ARE YOU TO NOD OFF OR FALL ASLEEP WHILE SITTING INACTIVE IN A PUBLIC PLACE: SLIGHT CHANCE OF DOZING
HOW LIKELY ARE YOU TO NOD OFF OR FALL ASLEEP WHEN YOU ARE A PASSENGER IN A CAR FOR AN HOUR WITHOUT A BREAK: SLIGHT CHANCE OF DOZING

## 2023-01-05 ENCOUNTER — OFFICE VISIT (OUTPATIENT)
Dept: SLEEP MEDICINE | Facility: CLINIC | Age: 63
End: 2023-01-05
Payer: COMMERCIAL

## 2023-01-05 VITALS
OXYGEN SATURATION: 96 % | HEART RATE: 83 BPM | SYSTOLIC BLOOD PRESSURE: 136 MMHG | BODY MASS INDEX: 27.42 KG/M2 | WEIGHT: 149 LBS | DIASTOLIC BLOOD PRESSURE: 87 MMHG | HEIGHT: 62 IN

## 2023-01-05 DIAGNOSIS — G47.33 OSA (OBSTRUCTIVE SLEEP APNEA): Primary | ICD-10-CM

## 2023-01-05 PROCEDURE — 99214 OFFICE O/P EST MOD 30 MIN: CPT | Performed by: PHYSICIAN ASSISTANT

## 2023-01-05 RX ORDER — ESTRADIOL 0.04 MG/D
PATCH, EXTENDED RELEASE TRANSDERMAL
COMMUNITY
Start: 2022-09-11

## 2023-01-05 RX ORDER — PROGESTERONE 100 MG/1
100 CAPSULE ORAL AT BEDTIME
COMMUNITY
Start: 2022-04-24

## 2023-01-05 RX ORDER — SIMVASTATIN 20 MG
20 TABLET ORAL
COMMUNITY
Start: 2021-10-18

## 2023-01-05 NOTE — PROGRESS NOTES
"[facility]   Outpatient Sleep Medicine  Jan 5, 2023       Name: Allie Thao MRN# [id number]   Age: 63 year old YOB: 1960            Assessment and Plan:   1. ANIRUDH (obstructive sleep apnea)  Patient is currently not using her CPAP with any regularity. Stopped this summer when started dating boyfriend and didn't want to wear mask around him. He was recently diagnosed with sleep apnea himself and is getting a machine.  She would like to restart regular use of her own machine since he will also be wearing one.  Before restarting she would like prescription for new mask and supplies so she can start fresh, I was happy to provide this for her today.  Very limited data on download today from low use but AHI is 7.7 at current pressure settings 8-15 cm H2O.  We agreed to have her schedule a follow-up visit in approximately 3 months after she restarts PAP therapy regularly for recheck, if AHI still elevated above goal will consider additional increase in pressure.  - Comprehensive DME       Chief Complaint      Chief Complaint   Patient presents with     CPAP Follow Up            History of Present Illness:     Allie Thao is a 63 year old female with depression, allergic rhinitis, hypothyroidism who presents to the clinic for follow-up of their severe obstructive sleep apnea.     Originally diagnosed via HST completed 12/6/2018 (154#, BMI 27.7) with AHI 30.6, supine AHI 33.2, oxygen bessy 76%, 13.2 minutes spent less than equal to 88%.     She was set up with CPAP 6-15cm H2O on 12/31/2018.    Has not used her CPAP regularly since summer 2022, had a few days of use in December. Started dating current boyfriend and didn't want to wear mask around him. States \"I know I need it I wake up dry and with pig snorts\". Wants to re-start use but wants new mask/supplies before re-starting. Boyfriend recently got diagnosed with ANIRUDH and will be using a CPAP machine himself.     Pressure change " "made at last visit tolerated well, doesn't recall any issues. Nasal pillow mask fits well and is comfortable however can sometimes leak into eyes.     ResMed Auto-PAP 8-15 cmH2O download (11/27/22-12/26/22):  5 total days of use. 25 nonuse days. 4 days with >4 hours use.  Average use on days used 5 hours 35 minutes per day. Median Leak 0 L/min. 95%ile Leak 14.9 L/min. CPAP 95% pressure 13.2cm. AHI 7.7    SCALES:   INSOMNIA: Insomnia Severity Score: 6   SLEEPINESS: [place] Sleepiness Score: 8    Past medical/surgical history, family history, social history, medications and allergies were reviewed.           Physical Examination:   /87   Pulse 83   Ht 1.575 m (5' 2\")   Wt 67.6 kg (149 lb)   LMP 08/18/2010   SpO2 96%   BMI 27.25 kg/m    General appearance: Awake, alert, cooperative. Well groomed. Sitting comfortably in chair. In no apparent distress.  HEENT: Head: Normocephalic, atraumatic. Eyes:Conjunctiva clear. Sclera normal. Remainder of face covered by mask.   Pulmonary:  Able to speak easily in full sentences. No cough or wheeze.   Skin:  No rashes or significant lesions on visible skin.   Neurologic: Alert, oriented x3.   Psychiatric: Mood euthymic. Affect congruent with full range and intensity.      CC:  Noemi Garrett PA-C  Jan 5, 2023     [facility] [place]  [address] [place] [address]     [facility]  [address] [address]    Chart documentation was completed, in part, with [employer] voice-recognition software. Even though reviewed, some grammatical, spelling, and word errors may remain.    33 minutes spent on day of encounter doing chart review, history and exam, documentation, and further activities as noted above    "

## 2023-02-23 ENCOUNTER — NURSE TRIAGE (OUTPATIENT)
Dept: NURSING | Facility: CLINIC | Age: 63
End: 2023-02-23
Payer: COMMERCIAL

## 2023-02-23 NOTE — TELEPHONE ENCOUNTER
Patient calling. Boyfriend has tested positive for covid. Patient is symptomatic.      COVID Positive/Requesting COVID treatment    Patient is positive for COVID and requesting treatment options.    Date of positive COVID test (PCR or at home)? has not tested yet, will be going to get an at-home test  Current COVID symptoms: fever or chills, cough, fatigue, muscle or body aches, headache, new loss of taste or smell, sore throat and congestion or runny nose  Date COVID symptoms began: 2/19/2023    Discovered at end of call that patient's PCP is not an [facility] provider. Recommended that patient call her regular provider or to go [facility] [place]. Patient is in agreement.    Karely Sheppard RN  [facility]  February 23, 2023, 3:31 PM    Reason for Disposition    [1] HIGH RISK for severe COVID complications (e.g., weak immune system, age > 64 years, obesity with BMI of 30 or higher, pregnant, chronic lung disease or other chronic medical condition) AND [2] COVID symptoms (e.g., cough, fever)  (Exceptions: Already seen by PCP and no new or worsening symptoms.)     Patient is considered high risk due to being over 50 years of age per the most recent CDC guidelines.    Additional Information    Negative: SEVERE difficulty breathing (e.g., struggling for each breath, speaks in single words)    Negative: Difficult to awaken or acting confused (e.g., disoriented, slurred speech)    Negative: Bluish (or gray) lips or face now    Negative: Shock suspected (e.g., cold/pale/clammy skin, too weak to stand, low BP, rapid pulse)    Negative: Sounds like a life-threatening emergency to the triager    Negative: [1] Diagnosed or suspected COVID-19 AND [2] symptoms lasting 3 or more weeks    Negative: [1] COVID-19 exposure AND [2] no symptoms    Negative: COVID-19 vaccine reaction suspected (e.g., fever, headache, muscle aches) occurring 1 to 3 days after getting vaccine    Negative: COVID-19 vaccine, questions about    Negative: [1]  Lives with someone known to have influenza (flu test positive) AND [2] flu-like symptoms (e.g., cough, runny nose, sore throat, SOB; with or without fever)    Negative: [1] Adult with possible COVID-19 symptoms AND [2] triager concerned about severity of symptoms or other causes    Negative: COVID-19 and breastfeeding, questions about    Negative: SEVERE or constant chest pain or pressure  (Exception: Mild central chest pain, present only when coughing.)    Negative: MODERATE difficulty breathing (e.g., speaks in phrases, SOB even at rest, pulse 100-120)    Negative: Headache and stiff neck (can't touch chin to chest)    Negative: Oxygen level (e.g., pulse oximetry) 90 percent or lower    Negative: Chest pain or pressure  (Exception: MILD central chest pain, present only when coughing)    Negative: Patient sounds very sick or weak to the triager    Protocols used: CORONAVIRUS (COVID-19) DIAGNOSED OR [id number]

## 2023-07-20 ENCOUNTER — TRANSFERRED RECORDS (OUTPATIENT)
Dept: HEALTH INFORMATION MANAGEMENT | Facility: CLINIC | Age: 63
End: 2023-07-20

## 2023-11-16 ENCOUNTER — TELEPHONE (OUTPATIENT)
Dept: SLEEP MEDICINE | Facility: CLINIC | Age: 63
End: 2023-11-16

## 2023-11-16 ENCOUNTER — ANCILLARY PROCEDURE (OUTPATIENT)
Dept: MAMMOGRAPHY | Facility: CLINIC | Age: 63
End: 2023-11-16
Attending: FAMILY MEDICINE
Payer: COMMERCIAL

## 2023-11-16 DIAGNOSIS — G47.33 OSA (OBSTRUCTIVE SLEEP APNEA): Primary | ICD-10-CM

## 2023-11-16 DIAGNOSIS — Z12.31 VISIT FOR SCREENING MAMMOGRAM: ICD-10-CM

## 2023-11-16 PROCEDURE — 77063 BREAST TOMOSYNTHESIS BI: CPT | Mod: TC | Performed by: STUDENT IN AN ORGANIZED HEALTH CARE EDUCATION/TRAINING PROGRAM

## 2023-11-16 PROCEDURE — 77067 SCR MAMMO BI INCL CAD: CPT | Mod: TC | Performed by: STUDENT IN AN ORGANIZED HEALTH CARE EDUCATION/TRAINING PROGRAM

## 2023-11-16 NOTE — TELEPHONE ENCOUNTER
FYI - Status Update    Who is Calling: patient    Update: patient insurance will  change 12/1/2023. Unsure what will all be covered for cpap machine and supplies. Patient would like to know if she could call prescripition in and she still will keep the appointment for the 12/6/2023    Does caller want a call/response back: Yes     Could we send this information to you in [place] or would you prefer to receive a phone call?:   Patient would prefer a phone call   Okay to leave a detailed message?: Yes at Home number on file [phone] (home)

## 2023-11-16 NOTE — TELEPHONE ENCOUNTER
Last ov 1/5/23  Next ov 12/7/23    Patient requesting updated order for CPAP supplies prior to appointment. Order pended and routed to provider for consideration.    Lily ALVAREZ RN  [facility]

## 2023-12-01 ASSESSMENT — SLEEP AND FATIGUE QUESTIONNAIRES
HOW LIKELY ARE YOU TO NOD OFF OR FALL ASLEEP IN A CAR, WHILE STOPPED FOR A FEW MINUTES IN TRAFFIC: WOULD NEVER DOZE
HOW LIKELY ARE YOU TO NOD OFF OR FALL ASLEEP WHILE LYING DOWN TO REST IN THE AFTERNOON WHEN CIRCUMSTANCES PERMIT: MODERATE CHANCE OF DOZING
HOW LIKELY ARE YOU TO NOD OFF OR FALL ASLEEP WHILE SITTING INACTIVE IN A PUBLIC PLACE: SLIGHT CHANCE OF DOZING
HOW LIKELY ARE YOU TO NOD OFF OR FALL ASLEEP WHILE WATCHING TV: SLIGHT CHANCE OF DOZING
HOW LIKELY ARE YOU TO NOD OFF OR FALL ASLEEP WHILE SITTING AND TALKING TO SOMEONE: WOULD NEVER DOZE
HOW LIKELY ARE YOU TO NOD OFF OR FALL ASLEEP WHILE SITTING QUIETLY AFTER LUNCH WITHOUT ALCOHOL: SLIGHT CHANCE OF DOZING
HOW LIKELY ARE YOU TO NOD OFF OR FALL ASLEEP WHILE SITTING AND READING: SLIGHT CHANCE OF DOZING
HOW LIKELY ARE YOU TO NOD OFF OR FALL ASLEEP WHEN YOU ARE A PASSENGER IN A CAR FOR AN HOUR WITHOUT A BREAK: SLIGHT CHANCE OF DOZING

## 2023-12-07 ENCOUNTER — OFFICE VISIT (OUTPATIENT)
Dept: SLEEP MEDICINE | Facility: CLINIC | Age: 63
End: 2023-12-07
Payer: COMMERCIAL

## 2023-12-07 VITALS
HEART RATE: 69 BPM | DIASTOLIC BLOOD PRESSURE: 79 MMHG | BODY MASS INDEX: 27.38 KG/M2 | WEIGHT: 148.8 LBS | SYSTOLIC BLOOD PRESSURE: 120 MMHG | HEIGHT: 62 IN | OXYGEN SATURATION: 99 %

## 2023-12-07 DIAGNOSIS — G47.33 OSA (OBSTRUCTIVE SLEEP APNEA): Primary | ICD-10-CM

## 2023-12-07 PROCEDURE — 99215 OFFICE O/P EST HI 40 MIN: CPT | Performed by: PHYSICIAN ASSISTANT

## 2023-12-07 NOTE — NURSING NOTE
"Chief Complaint   Patient presents with    CPAP Follow Up     CPAP follow up, leaking       Initial /79   Pulse 69   Ht 1.575 m (5' 2.01\")   Wt 67.5 kg (148 lb 12.8 oz)   LMP 08/16/2010   SpO2 99%   BMI 27.21 kg/m   Estimated body mass index is 27.21 kg/m  as calculated from the following:    Height as of this encounter: 1.575 m (5' 2.01\").    Weight as of this encounter: 67.5 kg (148 lb 12.8 oz).    Medication Reconciliation: complete  ESS 7  Alexandra Penn MA     "

## 2023-12-07 NOTE — PROGRESS NOTES
[facility]   Outpatient Sleep Medicine  Dec 7, 2023       Name: Allie Thao MRN# [id number]   Age: 63 year old YOB: 1960            Assessment and Plan:   1. ANIRUDH (obstructive sleep apnea)  Patient's sleep apnea is overall well treated with current PAP settings [id number] with residual AHI of 7.4 events per hour (diagnostic AHI 31). Discussed option of pressure increase to [id number] to see if we can further improve AHI and patient would like to try this. Having some leaking from mask, will trial different size because thinks it is too big for her. Some dry mouth, wanting to try Biotene products over chin strap/mouth tape. Compliance on CPAP is good, will continue nightly use. Prescription updated today for replacement CPAP machine, will be eligible end of December. Assume she will need a compliance visit on her new machine so will plan to see her back about 2-3 months after getting replacement device for compliance follow-up.   - Comprehensive DME       Chief Complaint      Chief Complaint   Patient presents with    CPAP Follow Up     CPAP follow up, leaking          History of Present Illness:     Allie Thao is a 63 year old female with depression, allergic rhinitis, hypothyroidism who presents to the clinic for follow-up of their severe obstructive sleep apnea.      Originally diagnosed via HST completed 12/6/2018 (154#, BMI 27.7) with AHI 30.6, supine AHI 33.2, oxygen bessy 76%, 13.2 minutes spent less than equal to 88%.     She was set up with CPAP 6-15cm H2O on 12/31/2018.    At least visit 1/5/2023 wasn't using her CPAP with any regularity but wanted to re-start. Prescription renewed for mask/supplies and plan was to follow-up 3 months after she starts back on machine to review progress/compliance.     Returns to clinic today for follow-up. Doing well overall on her machine. Overall, the patient rates their experience with PAP as 9 (0 poor, 10 great).  Patient is  "using a nasal pillow mask. The mask is comfortable but does leak, can wake up in the triana hoaith red eyes and can hear it blowing. Wonders if size of her mask is too large or if headgear too large. Some dry mouth in AM. No snoring or gasp arousals with mask on. The pressure settings are comfortable.     Wondering about replacement machine as her's is almost 5 years and \"it gets warm there is a bubble richa on my dresser\".     Bedtime is typically 12:00AM or later. Usually it takes about 10 minutes or less to fall asleep with the mask on. Wake time is typically 9-9:30AM.  Patient is using PAP therapy 6.5 hours per night. The patient is usually getting 7-8 hours of sleep per night. Does feel well rested in AM.     ResMed Auto-PAP 8-15 cmH2O download:  30 total days of use. 0 nonuse days. 25 days with >4 hours use.  Average use 6 hours 29 minutes per day. Median Leak 2.8 L/min. 95%ile Leak 19.8 L/min. CPAP 95% pressure 13.3cm. AHI 7.4 (obstructive apnea 4.9, central apnea 1.7, hypopnea 0.4, unknown 0.3)    SCALES:   INSOMNIA: Insomnia Severity Score: 6   SLEEPINESS: Ogema Sleepiness Score: 7    Past medical/surgical history, family history, social history, medications and allergies were reviewed.           Physical Examination:   /79   Pulse 69   Ht 1.575 m (5' 2.01\")   Wt 67.5 kg (148 lb 12.8 oz)   LMP 08/16/2010   SpO2 99%   BMI 27.21 kg/m    General appearance: Awake, alert, cooperative. Well groomed. Sitting comfortably in chair. In no apparent distress.  HEENT: Head: Normocephalic, atraumatic. Eyes:Conjunctiva clear. Sclera normal. Nose: External appearance without deformity.   Pulmonary:  Able to speak easily in full sentences. No cough or wheeze.   Skin:  No rashes or significant lesions on visible skin.   Neurologic: Alert, oriented x3.   Psychiatric: Mood euthymic. Affect congruent with full range and intensity.      CC:  Noemi Garrett PA-C  Dec 7, 2023     [facility]  [address] , [place] [address]     [facility]  [address] [facility], [place] [address]    Chart documentation was completed, in part, with eCullet voice-recognition software. Even though reviewed, some grammatical, spelling, and word errors may remain.    40 minutes spent on day of encounter doing chart review, history and exam, documentation, and further activities as noted above

## 2024-02-06 ENCOUNTER — DOCUMENTATION ONLY (OUTPATIENT)
Dept: SLEEP MEDICINE | Facility: CLINIC | Age: 64
End: 2024-02-06
Payer: COMMERCIAL

## 2024-02-06 ENCOUNTER — APPOINTMENT (OUTPATIENT)
Dept: SLEEP MEDICINE | Facility: CLINIC | Age: 64
End: 2024-02-06
Payer: COMMERCIAL

## 2024-02-06 DIAGNOSIS — G47.33 OSA (OBSTRUCTIVE SLEEP APNEA): Primary | ICD-10-CM

## 2024-02-06 NOTE — PROGRESS NOTES
Patient was offered choice of vendor and chose [facility].  Patient Allie Thao was set up at [facility]  on February 6, 2024. Patient received a Resmed Airsense 11. Pressures were set at  10-15 cm H2O. Patient s ramp is 6 cm H2O for 40 minutes and FLEX/EPR is EPR, 2. Patient received a Resmed mask name: Airfit P10 nasal pillow mask size standard, heated tubing and heated humidifier. Patient does not need to meet compliance. Patient has a follow up on 4/8/2024 with Karely Lincoln PA-C .    Kaleigh Franco

## 2024-03-17 ENCOUNTER — HEALTH MAINTENANCE LETTER (OUTPATIENT)
Age: 64
End: 2024-03-17

## 2025-02-22 ENCOUNTER — HEALTH MAINTENANCE LETTER (OUTPATIENT)
Age: 65
End: 2025-02-22

## 2025-06-29 ENCOUNTER — MEDICAL CORRESPONDENCE (OUTPATIENT)
Dept: HEALTH INFORMATION MANAGEMENT | Facility: CLINIC | Age: 65
End: 2025-06-29

## 2025-08-24 ASSESSMENT — SLEEP AND FATIGUE QUESTIONNAIRES
HOW LIKELY ARE YOU TO NOD OFF OR FALL ASLEEP WHILE SITTING AND READING: SLIGHT CHANCE OF DOZING
HOW LIKELY ARE YOU TO NOD OFF OR FALL ASLEEP IN A CAR, WHILE STOPPED FOR A FEW MINUTES IN TRAFFIC: WOULD NEVER DOZE
HOW LIKELY ARE YOU TO NOD OFF OR FALL ASLEEP WHEN YOU ARE A PASSENGER IN A CAR FOR AN HOUR WITHOUT A BREAK: SLIGHT CHANCE OF DOZING
HOW LIKELY ARE YOU TO NOD OFF OR FALL ASLEEP WHILE LYING DOWN TO REST IN THE AFTERNOON WHEN CIRCUMSTANCES PERMIT: SLIGHT CHANCE OF DOZING
HOW LIKELY ARE YOU TO NOD OFF OR FALL ASLEEP WHILE SITTING QUIETLY AFTER LUNCH WITHOUT ALCOHOL: SLIGHT CHANCE OF DOZING
HOW LIKELY ARE YOU TO NOD OFF OR FALL ASLEEP WHILE WATCHING TV: SLIGHT CHANCE OF DOZING
HOW LIKELY ARE YOU TO NOD OFF OR FALL ASLEEP WHILE SITTING AND TALKING TO SOMEONE: WOULD NEVER DOZE
HOW LIKELY ARE YOU TO NOD OFF OR FALL ASLEEP WHILE SITTING INACTIVE IN A PUBLIC PLACE: SLIGHT CHANCE OF DOZING

## 2025-08-25 PROBLEM — D25.9 UTERINE LEIOMYOMA: Status: RESOLVED | Noted: 2023-09-11 | Resolved: 2025-08-25

## 2025-08-25 PROBLEM — G47.33 OSA (OBSTRUCTIVE SLEEP APNEA): Chronic | Status: ACTIVE | Noted: 2018-12-12

## 2025-08-25 PROBLEM — J31.0 CHRONIC RHINITIS: Chronic | Status: ACTIVE | Noted: 2017-10-09

## 2025-08-25 PROBLEM — F41.9 ANXIETY: Chronic | Status: ACTIVE | Noted: 2018-10-25

## 2025-08-25 PROBLEM — N95.0 PMB (POSTMENOPAUSAL BLEEDING): Status: RESOLVED | Noted: 2023-08-29 | Resolved: 2025-08-25

## 2025-08-25 PROBLEM — N95.1 VASOMOTOR SYMPTOMS DUE TO MENOPAUSE: Status: RESOLVED | Noted: 2018-10-24 | Resolved: 2025-08-25

## 2025-08-25 PROBLEM — R93.89 THICKENED ENDOMETRIUM: Status: RESOLVED | Noted: 2023-09-11 | Resolved: 2025-08-25

## 2025-08-25 PROBLEM — D12.6 ADENOMATOUS POLYP OF COLON: Status: ACTIVE | Noted: 2018-10-25

## 2025-08-25 PROBLEM — F41.9 ANXIETY: Status: ACTIVE | Noted: 2018-10-25

## 2025-08-25 PROBLEM — N94.10 DYSPAREUNIA IN FEMALE: Status: ACTIVE | Noted: 2018-10-25

## 2025-08-26 ENCOUNTER — CARE COORDINATION (OUTPATIENT)
Dept: SLEEP MEDICINE | Facility: CLINIC | Age: 65
End: 2025-08-26

## 2025-08-26 ENCOUNTER — OFFICE VISIT (OUTPATIENT)
Dept: SLEEP MEDICINE | Facility: CLINIC | Age: 65
End: 2025-08-26
Payer: COMMERCIAL

## 2025-08-26 DIAGNOSIS — G47.33 OSA (OBSTRUCTIVE SLEEP APNEA): Primary | Chronic | ICD-10-CM

## 2025-08-26 PROCEDURE — 99203 OFFICE O/P NEW LOW 30 MIN: CPT | Performed by: INTERNAL MEDICINE

## 2025-08-26 PROCEDURE — G2211 COMPLEX E/M VISIT ADD ON: HCPCS | Performed by: INTERNAL MEDICINE

## 2025-08-29 ENCOUNTER — TELEPHONE (OUTPATIENT)
Dept: SLEEP MEDICINE | Facility: CLINIC | Age: 65
End: 2025-08-29
Payer: COMMERCIAL

## (undated) RX ORDER — FENTANYL CITRATE 50 UG/ML
INJECTION, SOLUTION INTRAMUSCULAR; INTRAVENOUS
Status: DISPENSED
Start: 2021-01-11

## (undated) RX ORDER — FENTANYL CITRATE 50 UG/ML
INJECTION, SOLUTION INTRAMUSCULAR; INTRAVENOUS
Status: DISPENSED
Start: 2022-12-15